# Patient Record
Sex: MALE | Race: WHITE | HISPANIC OR LATINO
[De-identification: names, ages, dates, MRNs, and addresses within clinical notes are randomized per-mention and may not be internally consistent; named-entity substitution may affect disease eponyms.]

---

## 2017-01-03 ENCOUNTER — APPOINTMENT (OUTPATIENT)
Dept: SURGICAL ONCOLOGY | Facility: CLINIC | Age: 82
End: 2017-01-03

## 2017-01-03 VITALS
RESPIRATION RATE: 13 BRPM | HEART RATE: 60 BPM | HEIGHT: 63 IN | DIASTOLIC BLOOD PRESSURE: 77 MMHG | BODY MASS INDEX: 28.88 KG/M2 | SYSTOLIC BLOOD PRESSURE: 133 MMHG | WEIGHT: 163 LBS

## 2017-01-03 DIAGNOSIS — N40.0 BENIGN PROSTATIC HYPERPLASIA WITHOUT LOWER URINARY TRACT SYMPMS: ICD-10-CM

## 2017-01-03 DIAGNOSIS — E03.9 HYPOTHYROIDISM, UNSPECIFIED: ICD-10-CM

## 2017-01-03 DIAGNOSIS — E78.5 HYPERLIPIDEMIA, UNSPECIFIED: ICD-10-CM

## 2017-01-03 DIAGNOSIS — I25.10 ATHEROSCLEROTIC HEART DISEASE OF NATIVE CORONARY ARTERY W/OUT ANGINA PECTORIS: ICD-10-CM

## 2017-01-03 DIAGNOSIS — K21.9 GASTRO-ESOPHAGEAL REFLUX DISEASE W/OUT ESOPHAGITIS: ICD-10-CM

## 2017-01-03 DIAGNOSIS — Z87.891 PERSONAL HISTORY OF NICOTINE DEPENDENCE: ICD-10-CM

## 2017-01-03 RX ORDER — LEVOTHYROXINE SODIUM 0.05 MG/1
50 TABLET ORAL
Refills: 0 | Status: ACTIVE | COMMUNITY

## 2017-01-03 RX ORDER — PANTOPRAZOLE SODIUM 40 MG/1
40 TABLET, DELAYED RELEASE ORAL
Refills: 0 | Status: ACTIVE | COMMUNITY

## 2017-01-03 RX ORDER — PRAVASTATIN SODIUM 80 MG/1
TABLET ORAL
Refills: 0 | Status: ACTIVE | COMMUNITY

## 2017-01-03 RX ORDER — FINASTERIDE 1 MG/1
TABLET ORAL
Refills: 0 | Status: ACTIVE | COMMUNITY

## 2017-01-06 ENCOUNTER — OTHER (OUTPATIENT)
Age: 82
End: 2017-01-06

## 2017-01-09 ENCOUNTER — RESULT REVIEW (OUTPATIENT)
Age: 82
End: 2017-01-09

## 2017-01-10 ENCOUNTER — OUTPATIENT (OUTPATIENT)
Dept: OUTPATIENT SERVICES | Facility: HOSPITAL | Age: 82
LOS: 1 days | End: 2017-01-10
Payer: MEDICARE

## 2017-01-10 DIAGNOSIS — C16.0 MALIGNANT NEOPLASM OF CARDIA: ICD-10-CM

## 2017-01-10 PROCEDURE — C1726: CPT

## 2017-01-10 PROCEDURE — 88313 SPECIAL STAINS GROUP 2: CPT | Mod: 26

## 2017-01-10 PROCEDURE — 88342 IMHCHEM/IMCYTCHM 1ST ANTB: CPT

## 2017-01-10 PROCEDURE — 88312 SPECIAL STAINS GROUP 1: CPT | Mod: 26

## 2017-01-10 PROCEDURE — 43238 EGD US FINE NEEDLE BX/ASPIR: CPT

## 2017-01-10 PROCEDURE — 88312 SPECIAL STAINS GROUP 1: CPT

## 2017-01-10 PROCEDURE — 88173 CYTOPATH EVAL FNA REPORT: CPT

## 2017-01-10 PROCEDURE — 88341 IMHCHEM/IMCYTCHM EA ADD ANTB: CPT

## 2017-01-10 PROCEDURE — 88360 TUMOR IMMUNOHISTOCHEM/MANUAL: CPT

## 2017-01-10 PROCEDURE — 88360 TUMOR IMMUNOHISTOCHEM/MANUAL: CPT | Mod: 26,59

## 2017-01-10 PROCEDURE — 88342 IMHCHEM/IMCYTCHM 1ST ANTB: CPT | Mod: 26,59

## 2017-01-10 PROCEDURE — 88341 IMHCHEM/IMCYTCHM EA ADD ANTB: CPT | Mod: 26,59

## 2017-01-10 PROCEDURE — 88365 INSITU HYBRIDIZATION (FISH): CPT

## 2017-01-10 PROCEDURE — 88305 TISSUE EXAM BY PATHOLOGIST: CPT | Mod: 26

## 2017-01-10 PROCEDURE — 88173 CYTOPATH EVAL FNA REPORT: CPT | Mod: 26

## 2017-01-10 PROCEDURE — 88305 TISSUE EXAM BY PATHOLOGIST: CPT

## 2017-01-10 PROCEDURE — 88172 CYTP DX EVAL FNA 1ST EA SITE: CPT

## 2017-01-10 PROCEDURE — 88365 INSITU HYBRIDIZATION (FISH): CPT | Mod: 26

## 2017-01-10 PROCEDURE — 88313 SPECIAL STAINS GROUP 2: CPT

## 2017-01-10 PROCEDURE — 88305 TISSUE EXAM BY PATHOLOGIST: CPT | Mod: 26,59

## 2017-01-18 LAB — NON-GYNECOLOGICAL CYTOLOGY STUDY: SIGNIFICANT CHANGE UP

## 2017-01-19 ENCOUNTER — APPOINTMENT (OUTPATIENT)
Dept: SURGICAL ONCOLOGY | Facility: CLINIC | Age: 82
End: 2017-01-19

## 2017-01-19 VITALS
SYSTOLIC BLOOD PRESSURE: 125 MMHG | HEART RATE: 77 BPM | HEIGHT: 63 IN | BODY MASS INDEX: 28.62 KG/M2 | WEIGHT: 161.5 LBS | DIASTOLIC BLOOD PRESSURE: 72 MMHG

## 2017-01-19 DIAGNOSIS — C16.9 MALIGNANT NEOPLASM OF STOMACH, UNSPECIFIED: ICD-10-CM

## 2017-01-25 ENCOUNTER — APPOINTMENT (OUTPATIENT)
Dept: GASTROENTEROLOGY | Facility: CLINIC | Age: 82
End: 2017-01-25

## 2017-01-25 LAB — SURGICAL PATHOLOGY STUDY: SIGNIFICANT CHANGE UP

## 2017-03-13 ENCOUNTER — APPOINTMENT (OUTPATIENT)
Dept: PULMONOLOGY | Facility: CLINIC | Age: 82
End: 2017-03-13

## 2018-12-19 ENCOUNTER — INPATIENT (INPATIENT)
Facility: HOSPITAL | Age: 83
LOS: 9 days | Discharge: HOME | DRG: 314 | End: 2018-12-29
Attending: FAMILY MEDICINE | Admitting: FAMILY MEDICINE
Payer: MEDICARE

## 2018-12-19 VITALS
HEART RATE: 63 BPM | SYSTOLIC BLOOD PRESSURE: 169 MMHG | TEMPERATURE: 97 F | OXYGEN SATURATION: 100 % | HEIGHT: 65 IN | WEIGHT: 145.06 LBS | DIASTOLIC BLOOD PRESSURE: 58 MMHG | RESPIRATION RATE: 18 BRPM

## 2018-12-19 DIAGNOSIS — C16.9 MALIGNANT NEOPLASM OF STOMACH, UNSPECIFIED: ICD-10-CM

## 2018-12-19 DIAGNOSIS — I31.3 PERICARDIAL EFFUSION (NONINFLAMMATORY): ICD-10-CM

## 2018-12-19 DIAGNOSIS — E03.9 HYPOTHYROIDISM, UNSPECIFIED: ICD-10-CM

## 2018-12-19 DIAGNOSIS — N18.5 CHRONIC KIDNEY DISEASE, STAGE 5: ICD-10-CM

## 2018-12-19 DIAGNOSIS — I10 ESSENTIAL (PRIMARY) HYPERTENSION: ICD-10-CM

## 2018-12-19 DIAGNOSIS — R33.9 RETENTION OF URINE, UNSPECIFIED: ICD-10-CM

## 2018-12-19 DIAGNOSIS — N32.89 OTHER SPECIFIED DISORDERS OF BLADDER: ICD-10-CM

## 2018-12-19 LAB
ALBUMIN SERPL ELPH-MCNC: 3.8 G/DL — SIGNIFICANT CHANGE UP (ref 3.3–5)
ALP SERPL-CCNC: 103 U/L — SIGNIFICANT CHANGE UP (ref 40–120)
ALT FLD-CCNC: 7 U/L — LOW (ref 10–45)
ANION GAP SERPL CALC-SCNC: 17 MMOL/L — SIGNIFICANT CHANGE UP (ref 5–17)
APPEARANCE UR: CLEAR — SIGNIFICANT CHANGE UP
APTT BLD: 30 SEC — SIGNIFICANT CHANGE UP (ref 27.5–36.3)
AST SERPL-CCNC: 10 U/L — SIGNIFICANT CHANGE UP (ref 10–40)
BACTERIA # UR AUTO: NEGATIVE — SIGNIFICANT CHANGE UP
BASOPHILS # BLD AUTO: 0 K/UL — SIGNIFICANT CHANGE UP (ref 0–0.2)
BASOPHILS NFR BLD AUTO: 0.5 % — SIGNIFICANT CHANGE UP (ref 0–2)
BILIRUB SERPL-MCNC: 0.2 MG/DL — SIGNIFICANT CHANGE UP (ref 0.2–1.2)
BILIRUB UR-MCNC: NEGATIVE — SIGNIFICANT CHANGE UP
BUN SERPL-MCNC: 96 MG/DL — HIGH (ref 7–23)
CALCIUM SERPL-MCNC: 9.6 MG/DL — SIGNIFICANT CHANGE UP (ref 8.4–10.5)
CHLORIDE SERPL-SCNC: 107 MMOL/L — SIGNIFICANT CHANGE UP (ref 96–108)
CO2 SERPL-SCNC: 17 MMOL/L — LOW (ref 22–31)
COLOR SPEC: COLORLESS — SIGNIFICANT CHANGE UP
CREAT SERPL-MCNC: 7.62 MG/DL — HIGH (ref 0.5–1.3)
DIFF PNL FLD: NEGATIVE — SIGNIFICANT CHANGE UP
EOSINOPHIL # BLD AUTO: 0.4 K/UL — SIGNIFICANT CHANGE UP (ref 0–0.5)
EOSINOPHIL NFR BLD AUTO: 4.4 % — SIGNIFICANT CHANGE UP (ref 0–6)
EPI CELLS # UR: 0 /HPF — SIGNIFICANT CHANGE UP
GAS PNL BLDV: SIGNIFICANT CHANGE UP
GLUCOSE SERPL-MCNC: 100 MG/DL — HIGH (ref 70–99)
GLUCOSE UR QL: NEGATIVE — SIGNIFICANT CHANGE UP
HCT VFR BLD CALC: 24 % — LOW (ref 39–50)
HGB BLD-MCNC: 8.5 G/DL — LOW (ref 13–17)
HYALINE CASTS # UR AUTO: 1 /LPF — SIGNIFICANT CHANGE UP (ref 0–2)
INR BLD: 1.03 RATIO — SIGNIFICANT CHANGE UP (ref 0.88–1.16)
KETONES UR-MCNC: NEGATIVE — SIGNIFICANT CHANGE UP
LEUKOCYTE ESTERASE UR-ACNC: NEGATIVE — SIGNIFICANT CHANGE UP
LYMPHOCYTES # BLD AUTO: 1.4 K/UL — SIGNIFICANT CHANGE UP (ref 1–3.3)
LYMPHOCYTES # BLD AUTO: 17.7 % — SIGNIFICANT CHANGE UP (ref 13–44)
MCHC RBC-ENTMCNC: 32.2 PG — SIGNIFICANT CHANGE UP (ref 27–34)
MCHC RBC-ENTMCNC: 35.3 GM/DL — SIGNIFICANT CHANGE UP (ref 32–36)
MCV RBC AUTO: 91.2 FL — SIGNIFICANT CHANGE UP (ref 80–100)
MONOCYTES # BLD AUTO: 0.7 K/UL — SIGNIFICANT CHANGE UP (ref 0–0.9)
MONOCYTES NFR BLD AUTO: 8.7 % — SIGNIFICANT CHANGE UP (ref 2–14)
NEUTROPHILS # BLD AUTO: 5.6 K/UL — SIGNIFICANT CHANGE UP (ref 1.8–7.4)
NEUTROPHILS NFR BLD AUTO: 68.8 % — SIGNIFICANT CHANGE UP (ref 43–77)
NITRITE UR-MCNC: NEGATIVE — SIGNIFICANT CHANGE UP
PH UR: 6.5 — SIGNIFICANT CHANGE UP (ref 5–8)
PLATELET # BLD AUTO: 224 K/UL — SIGNIFICANT CHANGE UP (ref 150–400)
POTASSIUM SERPL-MCNC: 4.9 MMOL/L — SIGNIFICANT CHANGE UP (ref 3.5–5.3)
POTASSIUM SERPL-SCNC: 4.9 MMOL/L — SIGNIFICANT CHANGE UP (ref 3.5–5.3)
PROT SERPL-MCNC: 6.6 G/DL — SIGNIFICANT CHANGE UP (ref 6–8.3)
PROT UR-MCNC: ABNORMAL
PROTHROM AB SERPL-ACNC: 11.7 SEC — SIGNIFICANT CHANGE UP (ref 10–12.9)
RBC # BLD: 2.63 M/UL — LOW (ref 4.2–5.8)
RBC # FLD: 12.6 % — SIGNIFICANT CHANGE UP (ref 10.3–14.5)
RBC CASTS # UR COMP ASSIST: 5 /HPF — HIGH (ref 0–4)
SODIUM SERPL-SCNC: 141 MMOL/L — SIGNIFICANT CHANGE UP (ref 135–145)
SP GR SPEC: 1.01 — SIGNIFICANT CHANGE UP (ref 1.01–1.02)
UROBILINOGEN FLD QL: NEGATIVE — SIGNIFICANT CHANGE UP
WBC # BLD: 8.1 K/UL — SIGNIFICANT CHANGE UP (ref 3.8–10.5)
WBC # FLD AUTO: 8.1 K/UL — SIGNIFICANT CHANGE UP (ref 3.8–10.5)
WBC UR QL: 1 /HPF — SIGNIFICANT CHANGE UP (ref 0–5)

## 2018-12-19 PROCEDURE — 71250 CT THORAX DX C-: CPT | Mod: 26

## 2018-12-19 PROCEDURE — 99223 1ST HOSP IP/OBS HIGH 75: CPT

## 2018-12-19 PROCEDURE — 93306 TTE W/DOPPLER COMPLETE: CPT | Mod: 26

## 2018-12-19 PROCEDURE — 93010 ELECTROCARDIOGRAM REPORT: CPT

## 2018-12-19 PROCEDURE — 99285 EMERGENCY DEPT VISIT HI MDM: CPT | Mod: 25

## 2018-12-19 PROCEDURE — 74176 CT ABD & PELVIS W/O CONTRAST: CPT | Mod: 26

## 2018-12-19 RX ORDER — SODIUM BICARBONATE 1 MEQ/ML
650 SYRINGE (ML) INTRAVENOUS
Qty: 0 | Refills: 0 | Status: DISCONTINUED | OUTPATIENT
Start: 2018-12-19 | End: 2018-12-29

## 2018-12-19 RX ORDER — INFLUENZA VIRUS VACCINE 15; 15; 15; 15 UG/.5ML; UG/.5ML; UG/.5ML; UG/.5ML
0.5 SUSPENSION INTRAMUSCULAR ONCE
Qty: 0 | Refills: 0 | Status: COMPLETED | OUTPATIENT
Start: 2018-12-19 | End: 2018-12-19

## 2018-12-19 RX ORDER — HEPARIN SODIUM 5000 [USP'U]/ML
5000 INJECTION INTRAVENOUS; SUBCUTANEOUS EVERY 8 HOURS
Qty: 0 | Refills: 0 | Status: DISCONTINUED | OUTPATIENT
Start: 2018-12-19 | End: 2018-12-19

## 2018-12-19 RX ORDER — LEVOTHYROXINE SODIUM 125 MCG
75 TABLET ORAL DAILY
Qty: 0 | Refills: 0 | Status: DISCONTINUED | OUTPATIENT
Start: 2018-12-19 | End: 2018-12-29

## 2018-12-19 NOTE — H&P ADULT - PROBLEM SELECTOR PLAN 2
See above.  Would clarify with Dr. Umaña further considerations in this regard.  Will temporarily HOLD Proscar with Soniya.

## 2018-12-19 NOTE — H&P ADULT - ATTENDING COMMENTS
NIGHT HOSPITALIST:   Patient/ adult son in attendance aware of course and agree with plan/care as above.  Reviewed above in patient's native [place].   Patient's prognosis is poor.   Emotional support provided to patient/ son.  Patient/ son are not yet ready to address advance directives.  Care reviewed with covering NP/PA.   Care assumed by Dr. Pulliam.    Dick López MD  [phone]

## 2018-12-19 NOTE — H&P ADULT - PMH
Benign prostatic hyperplasia, unspecified whether lower urinary tract symptoms present    Hypothyroidism, unspecified type    Malignant neoplasm of stomach, unspecified location

## 2018-12-19 NOTE — H&P ADULT - NSHPSOURCEINFOTX_GEN_ALL_CORE
Adult son in attendance--reviewed medication bottles with son.   Patient's primary language is Czech but declined  phone--patient interviewed by examiner in patient's native Czech and son is bilingual.

## 2018-12-19 NOTE — H&P ADULT - NSHPSOCIALHISTORY_GEN_ALL_CORE
No ethanol history.  Quit tobacco in 2017.  Supportive adult sons, with one son currently undergoing treatment for Burkitt's lymphoma. No ethanol history.  No reported tobacco.    Supportive adult sons, with one son currently undergoing treatment for Burkitt's lymphoma.

## 2018-12-19 NOTE — H&P ADULT - NSHPPHYSICALEXAM_GEN_ALL_CORE
Physical exam with an elderly, cachectic, chronically ill appearing M.  Afebrile.  HR  60  RR 14.  BP  180/66  100% on RA.  HEENT< PERRL< EOMI< bitemporal wasting, neck supple, chest clear, bony rib cage, cor s1 s2, abdomen scaphoid, soft nontender, no rebound, ext with diffuse sarcopenia and interossei muscle wasting, 2++ B/L LE oedema.  Skin dry.   Poor nail hygiene.   No ulcers.   Neurologic exam hard of hearing, AxOx3.  Speech fluent.   Cognition grossly intact albeit somewhat dependent upon son in attendance during interview.  UE/Le 5/5.

## 2018-12-19 NOTE — H&P ADULT - NSHPOUTPATIENTPROVIDERS_GEN_ALL_CORE
Emmanuel Pulliam  [phone]  Neelam Pacheco MD [phone]  Edouard Holman DO  [phone]  Herber Umaña  [phone]

## 2018-12-19 NOTE — H&P ADULT - HISTORY OF PRESENT ILLNESS
NIGHT HOSPITALIST:   Patient UNKNOWN to me previously, assigned to me at this point via the ER and by Dr. Pulliam to admit this 86 y/o M--patient seen with patient's adult son in attendance--patient interviewed by examiner in patient's native Bulgarian--son is bilingual and patient/son declined telephone --patient apparently with a history of gastric CA with patient consistently declining operative and standard chemotherapy options, with patient apparently on a mistletoe treatment (?) as a nonstandard approach with a group in [place] (?) following an evaluation in [place]--patient with progressive B/L LE oedema with progressive azotemia and with suprapubic fullness.   Patient with reported history of unspecified prostate disorder and follows Dr. Umaña above.  Apparently patient is now reconsidering treatment by Dr. Pacheco above but referred to the ER due to the progression of Cr.  Patient denies abdominal pain, no red blood per rectum or melena.  No chest pain/pressure.  No dyspnoea.  No back pain, no tearing back pain.   No fever, no chills, no rigors.   No HA, no focal weakness.   Patient with anorexia and unspecified involuntary weight loss.   No rash, no joint pain.   No dysuria, no haematuria.   Remaining review of systems not contributory.

## 2018-12-19 NOTE — H&P ADULT - PROBLEM SELECTOR PLAN 1
See above.   Referred by renal.  Renal US>  Byrnes placed for now.  Sodium bicarbonate 650 mg 4XD.  Daily weights.

## 2018-12-19 NOTE — H&P ADULT - PROBLEM SELECTOR PLAN 6
See above.   Would clarify further considerations for management with Dr. Pacheco above.  Aspiration precautions.

## 2018-12-19 NOTE — ED PROVIDER NOTE - OBJECTIVE STATEMENT
86 yo M h/o gastric CA on natural treatment no chemo, HTN, HLD presents with 1 week worsening abdominal pain and distention, elevated creatinine in Dr. Edouard Lynn's office 7.6, and urinary retention. Pt denies fever, chills, N/V/D, blood in stool or urine, CP, SOB, fall/trauma, AMS.

## 2018-12-19 NOTE — H&P ADULT - PROBLEM SELECTOR PLAN 5
Would follow and review with renal if consideration with low dose Hydralazine PO on an interval basis.

## 2018-12-19 NOTE — ED ADULT NURSE NOTE - OBJECTIVE STATEMENT
84 yo male A&ox3 presents to the ED with the c/o being sent in by his doctor for elevated BUN and creatinine. Pt states that he has also been having back pain s/p fall 3 weeks ago. Pt denies getting any x-rays or ct scans for back pain. Hx of gastric cancer, not on chemo or radiation. As per son pt has getting injections for gastric cancer and " IV  treatments" in [place]. Pt denies any cp, no sob an no cough, pt denies dizziness. + abd pain, no tenderness and no n/v.  MAEX4

## 2018-12-19 NOTE — H&P ADULT - ASSESSMENT
NIGHT HOSPITALIST:  Presentation of patient after referral for progressive azotemia at least initially from obstructive uropathy with history of reported gastric CA with patient refusing all prior standard treatment (family has deferred to the patient's decisions), with bladder thickening unclear if this represents pathology from chronic obstruction versus a primary bladder neoplasm.   Unclear to the large pericardial effusion>>proper echo ordered by the ER.  Patient with no clinical evidence of tamponade>>would monitor patient's BP for now but with further elevation would review with renal consideration of low dose oral hydralazine if no contraindications.  Unclear if the pericardial process is from patient's gastric CA involvement.      Will temporarily HOLD Proscar for now, given Byrnes placement>>may inform Dr. Umaña of patient's admission.

## 2018-12-19 NOTE — ED PROVIDER NOTE - ATTENDING CONTRIBUTION TO CARE
86 y/o male with the above documented history and HPI who on exam appears comfortable. VSs noted, sclerae anicteric, MM's moist, neck supple, breathing c/ ease, abdomen soft c/ fullness and discomfort in suprapubic region, extremities s/ asymmetry, skin s/ rash or jaundice and neurologically intact. Screening studies ordered as requested. Will place ayala for apparent retention. To be admitted.

## 2018-12-19 NOTE — H&P ADULT - NSHPLABSRESULTS_GEN_ALL_CORE
WBC 8.1.  Hgb 8.5.  Platelets of 224K.  K+ 4.9.  Random glucose of 100.  CR 7.6.  HCO3 17.  Abl 3.8.  U/A with 1 WBC and 30 protein.  EKG tracing reviewed with no hyperacute T waves, NSR at 58.  CTT trunk no contrast with large pericardial effusion and small LEFT pleural effusion, B/L hydro with urinary bladder thickening, thickening of the gastric wall with gastrohepatic ligament adenopathy.

## 2018-12-19 NOTE — ED PROVIDER NOTE - CHIEF COMPLAINT
The patient is a 85y Male complaining of The patient is a 85y Male complaining of abdominal pain/distention

## 2018-12-20 ENCOUNTER — RESULT REVIEW (OUTPATIENT)
Age: 83
End: 2018-12-20

## 2018-12-20 LAB
AMYLASE FLD-CCNC: 20 U/L — SIGNIFICANT CHANGE UP
ANION GAP SERPL CALC-SCNC: 15 MMOL/L — SIGNIFICANT CHANGE UP (ref 5–17)
B PERT IGG+IGM PNL SER: ABNORMAL
BASOPHILS # BLD AUTO: 0.02 K/UL — SIGNIFICANT CHANGE UP (ref 0–0.2)
BASOPHILS NFR BLD AUTO: 0.2 % — SIGNIFICANT CHANGE UP (ref 0–2)
BLD GP AB SCN SERPL QL: NEGATIVE — SIGNIFICANT CHANGE UP
BUN SERPL-MCNC: 103 MG/DL — HIGH (ref 7–23)
C3 SERPL-MCNC: 99 MG/DL — SIGNIFICANT CHANGE UP (ref 81–157)
C4 SERPL-MCNC: 32 MG/DL — SIGNIFICANT CHANGE UP (ref 13–39)
CALCIUM SERPL-MCNC: 9.6 MG/DL — SIGNIFICANT CHANGE UP (ref 8.4–10.5)
CHLORIDE SERPL-SCNC: 111 MMOL/L — HIGH (ref 96–108)
CO2 SERPL-SCNC: 19 MMOL/L — LOW (ref 22–31)
COLOR FLD: YELLOW — SIGNIFICANT CHANGE UP
CREAT ?TM UR-MCNC: 24 MG/DL — SIGNIFICANT CHANGE UP
CREAT SERPL-MCNC: 7.92 MG/DL — HIGH (ref 0.5–1.3)
CULTURE RESULTS: NO GROWTH — SIGNIFICANT CHANGE UP
EOSINOPHIL # BLD AUTO: 0.1 K/UL — SIGNIFICANT CHANGE UP (ref 0–0.5)
EOSINOPHIL NFR BLD AUTO: 1.2 % — SIGNIFICANT CHANGE UP (ref 0–6)
FLUID INTAKE SUBSTANCE CLASS: SIGNIFICANT CHANGE UP
FLUID SEGMENTED GRANULOCYTES: 5 % — SIGNIFICANT CHANGE UP
GLUCOSE FLD-MCNC: 180 MG/DL — SIGNIFICANT CHANGE UP
GLUCOSE SERPL-MCNC: 98 MG/DL — SIGNIFICANT CHANGE UP (ref 70–99)
HCT VFR BLD CALC: 25.9 % — LOW (ref 39–50)
HGB BLD-MCNC: 8.3 G/DL — LOW (ref 13–17)
IMM GRANULOCYTES NFR BLD AUTO: 0.2 % — SIGNIFICANT CHANGE UP (ref 0–1.5)
LDH SERPL L TO P-CCNC: 133 U/L — SIGNIFICANT CHANGE UP
LYMPHOCYTES # BLD AUTO: 0.93 K/UL — LOW (ref 1–3.3)
LYMPHOCYTES # BLD AUTO: 11.4 % — LOW (ref 13–44)
LYMPHOCYTES # FLD: 12 % — SIGNIFICANT CHANGE UP
MCHC RBC-ENTMCNC: 29.5 PG — SIGNIFICANT CHANGE UP (ref 27–34)
MCHC RBC-ENTMCNC: 32 GM/DL — SIGNIFICANT CHANGE UP (ref 32–36)
MCV RBC AUTO: 92.2 FL — SIGNIFICANT CHANGE UP (ref 80–100)
MESOTHL CELL # FLD: 7 % — SIGNIFICANT CHANGE UP
MONOCYTES # BLD AUTO: 0.63 K/UL — SIGNIFICANT CHANGE UP (ref 0–0.9)
MONOCYTES NFR BLD AUTO: 7.7 % — SIGNIFICANT CHANGE UP (ref 2–14)
MONOS+MACROS # FLD: 69 % — SIGNIFICANT CHANGE UP
NEUTROPHILS # BLD AUTO: 6.49 K/UL — SIGNIFICANT CHANGE UP (ref 1.8–7.4)
NEUTROPHILS NFR BLD AUTO: 79.3 % — HIGH (ref 43–77)
OTHER CELLS FLD MANUAL: 7 % — SIGNIFICANT CHANGE UP
PLATELET # BLD AUTO: 198 K/UL — SIGNIFICANT CHANGE UP (ref 150–400)
POTASSIUM SERPL-MCNC: 5.2 MMOL/L — SIGNIFICANT CHANGE UP (ref 3.5–5.3)
POTASSIUM SERPL-SCNC: 5.2 MMOL/L — SIGNIFICANT CHANGE UP (ref 3.5–5.3)
PROT ?TM UR-MCNC: 630 MG/DL — HIGH (ref 0–12)
PROT FLD-MCNC: 3.9 G/DL — SIGNIFICANT CHANGE UP
PROT/CREAT UR-RTO: 26.3 RATIO — HIGH (ref 0–0.2)
RBC # BLD: 2.81 M/UL — LOW (ref 4.2–5.8)
RBC # FLD: 14.3 % — SIGNIFICANT CHANGE UP (ref 10.3–14.5)
RCV VOL RI: 2900 /UL — HIGH (ref 0–5)
RH IG SCN BLD-IMP: POSITIVE — SIGNIFICANT CHANGE UP
SODIUM SERPL-SCNC: 145 MMOL/L — SIGNIFICANT CHANGE UP (ref 135–145)
SPECIMEN SOURCE: SIGNIFICANT CHANGE UP
TOTAL NUCLEATED CELL COUNT, BODY FLUID: 280 /UL — HIGH (ref 0–5)
TSH SERPL-MCNC: 8.92 UIU/ML — HIGH (ref 0.27–4.2)
TUBE TYPE: SIGNIFICANT CHANGE UP
WBC # BLD: 8.19 K/UL — SIGNIFICANT CHANGE UP (ref 3.8–10.5)
WBC # FLD AUTO: 8.19 K/UL — SIGNIFICANT CHANGE UP (ref 3.8–10.5)

## 2018-12-20 PROCEDURE — 93010 ELECTROCARDIOGRAM REPORT: CPT

## 2018-12-20 PROCEDURE — 33010: CPT

## 2018-12-20 PROCEDURE — 33999 UNLISTED PX CARDIAC SURGERY: CPT

## 2018-12-20 PROCEDURE — 88112 CYTOPATH CELL ENHANCE TECH: CPT | Mod: 26

## 2018-12-20 PROCEDURE — 88305 TISSUE EXAM BY PATHOLOGIST: CPT | Mod: 26

## 2018-12-20 PROCEDURE — 99222 1ST HOSP IP/OBS MODERATE 55: CPT

## 2018-12-20 PROCEDURE — 76770 US EXAM ABDO BACK WALL COMP: CPT | Mod: 26

## 2018-12-20 PROCEDURE — 88108 CYTOPATH CONCENTRATE TECH: CPT | Mod: 26,59

## 2018-12-20 PROCEDURE — 88341 IMHCHEM/IMCYTCHM EA ADD ANTB: CPT | Mod: 26

## 2018-12-20 PROCEDURE — 76930: CPT

## 2018-12-20 PROCEDURE — 75989 ABSCESS DRAINAGE UNDER X-RAY: CPT | Mod: 26

## 2018-12-20 PROCEDURE — 88342 IMHCHEM/IMCYTCHM 1ST ANTB: CPT | Mod: 26

## 2018-12-20 RX ORDER — ACETAMINOPHEN 500 MG
650 TABLET ORAL ONCE
Qty: 0 | Refills: 0 | Status: COMPLETED | OUTPATIENT
Start: 2018-12-20 | End: 2018-12-20

## 2018-12-20 RX ORDER — DESMOPRESSIN ACETATE 0.1 MG/1
20 TABLET ORAL ONCE
Qty: 0 | Refills: 0 | Status: DISCONTINUED | OUTPATIENT
Start: 2018-12-20 | End: 2018-12-20

## 2018-12-20 RX ORDER — ACETAMINOPHEN 500 MG
1000 TABLET ORAL ONCE
Qty: 0 | Refills: 0 | Status: COMPLETED | OUTPATIENT
Start: 2018-12-20 | End: 2018-12-20

## 2018-12-20 RX ADMIN — Medication 1000 MILLIGRAM(S): at 21:27

## 2018-12-20 RX ADMIN — Medication 650 MILLIGRAM(S): at 05:12

## 2018-12-20 RX ADMIN — Medication 650 MILLIGRAM(S): at 23:07

## 2018-12-20 RX ADMIN — Medication 75 MICROGRAM(S): at 05:12

## 2018-12-20 RX ADMIN — Medication 400 MILLIGRAM(S): at 20:35

## 2018-12-20 RX ADMIN — Medication 650 MILLIGRAM(S): at 11:44

## 2018-12-20 NOTE — PROGRESS NOTE ADULT - SUBJECTIVE AND OBJECTIVE BOX
Patient is a 85y old  Male who presents with a chief complaint of Worsening creatinine with suprapubic fullness (20 Dec 2018 02:25)      INTERVAL HPI/OVERNIGHT EVENTS:    MEDICATIONS  (STANDING):  influenza   Vaccine 0.5 milliLiter(s) IntraMuscular once  levothyroxine 75 MICROGram(s) Oral daily  sodium bicarbonate 650 milliGRAM(s) Oral four times a day    MEDICATIONS  (PRN):      Allergies    No Known Allergies    Intolerances        Vital Signs Last 24 Hrs  T(C): 37.1 (20 Dec 2018 03:54), Max: 37.3 (20 Dec 2018 00:55)  T(F): 98.8 (20 Dec 2018 03:54), Max: 99.1 (20 Dec 2018 00:55)  HR: 63 (20 Dec 2018 03:54) (60 - 68)  BP: 169/66 (20 Dec 2018 03:54) (169/58 - 184/65)  BP(mean): --  RR: 18 (20 Dec 2018 03:54) (16 - 18)  SpO2: 96% (20 Dec 2018 03:54) (94% - 100%)    LABS:                        8.3    8.19  )-----------( 198      ( 20 Dec 2018 08:14 )             25.9     12-20    145  |  111<H>  |  103<H>  ----------------------------<  98  5.2   |  19<L>  |  7.92<H>    Ca    9.6      20 Dec 2018 06:37    TPro  6.6  /  Alb  3.8  /  TBili  0.2  /  DBili  x   /  AST  10  /  ALT  7<L>  /  AlkPhos  103  12-19    PT/INR - ( 19 Dec 2018 20:36 )   PT: 11.7 sec;   INR: 1.03 ratio         PTT - ( 19 Dec 2018 20:36 )  PTT:30.0 sec  Urinalysis Basic - ( 19 Dec 2018 15:45 )    Color: Colorless / Appearance: Clear / S.012 / pH: x  Gluc: x / Ketone: Negative  / Bili: Negative / Urobili: Negative   Blood: x / Protein: 30 mg/dL / Nitrite: Negative   Leuk Esterase: Negative / RBC: 5 /hpf / WBC 1 /hpf   Sq Epi: x / Non Sq Epi: 0 /hpf / Bacteria: Negative        RADIOLOGY & ADDITIONAL TESTS:        Dr Lewis [phone] Patient is a 85y old  Male who presents with a chief complaint of Worsening creatinine with suprapubic fullness (20 Dec 2018 02:25)    pt on tele -ectopy noted  INTERVAL HPI/OVERNIGHT EVENTS:    MEDICATIONS  (STANDING):  influenza   Vaccine 0.5 milliLiter(s) IntraMuscular once  levothyroxine 75 MICROGram(s) Oral daily  sodium bicarbonate 650 milliGRAM(s) Oral four times a day    MEDICATIONS  (PRN):      Allergies    No Known Allergies    Intolerances        Vital Signs Last 24 Hrs  T(C): 37.1 (20 Dec 2018 03:54), Max: 37.3 (20 Dec 2018 00:55)  T(F): 98.8 (20 Dec 2018 03:54), Max: 99.1 (20 Dec 2018 00:55)  HR: 63 (20 Dec 2018 03:54) (60 - 68)  BP: 169/66 (20 Dec 2018 03:54) (169/58 - 184/65)  BP(mean): --  RR: 18 (20 Dec 2018 03:54) (16 - 18)  SpO2: 96% (20 Dec 2018 03:54) (94% - 100%)    LABS:                        8.3    8.19  )-----------( 198      ( 20 Dec 2018 08:14 )             25.9     12-20    145  |  111<H>  |  103<H>  ----------------------------<  98  5.2   |  19<L>  |  7.92<H>    Ca    9.6      20 Dec 2018 06:37    TPro  6.6  /  Alb  3.8  /  TBili  0.2  /  DBili  x   /  AST  10  /  ALT  7<L>  /  AlkPhos  103  12-19    PT/INR - ( 19 Dec 2018 20:36 )   PT: 11.7 sec;   INR: 1.03 ratio         PTT - ( 19 Dec 2018 20:36 )  PTT:30.0 sec  Urinalysis Basic - ( 19 Dec 2018 15:45 )    Color: Colorless / Appearance: Clear / S.012 / pH: x  Gluc: x / Ketone: Negative  / Bili: Negative / Urobili: Negative   Blood: x / Protein: 30 mg/dL / Nitrite: Negative   Leuk Esterase: Negative / RBC: 5 /hpf / WBC 1 /hpf   Sq Epi: x / Non Sq Epi: 0 /hpf / Bacteria: Negative        RADIOLOGY & ADDITIONAL TESTS:        Dr Lewis [phone]

## 2018-12-20 NOTE — CHART NOTE - NSCHARTNOTEFT_GEN_A_CORE
CCU Accept Note    Transfer from: CATH Lab    HPI: 86 y/o M-patient apparently with a history of gastric CA with patient consistently declining operative and standard chemotherapy options, with patient apparently on a mistletoe treatment (?) as a nonstandard approach with a group in [place] (?) following an evaluation in [place]--patient with progressive B/L LE oedema with progressive azotemia and with suprapubic fullness.   Patient with reported history of unspecified prostate disorder and follows Dr. Umaña above.  Apparently patient is now reconsidering treatment by Dr. Pacheco was noticed with worsening renal function. seen in office yesterday for abn creatinine. was hyperkalemic and cr 7 sent to er for staging and r/o obstruction. also with large pericardial effusion. CT abd- Large pericardial effusion and small left pleural effusion. Bilateral hydroureteronephrosis, severe on the left and moderate on the right, to the level of the urinary bladder. Thickening of the gastric wall in the region of the fundus likely corresponds with patient's known gastric cancer.      Vital Signs Last 24 Hrs  T(C): 36.8 (20 Dec 2018 16:30), Max: 37.3 (20 Dec 2018 00:55)  T(F): 98.2 (20 Dec 2018 16:30), Max: 99.1 (20 Dec 2018 00:55)  HR: 58 (20 Dec 2018 18:00) (58 - 71)  BP: 143/59 (20 Dec 2018 18:00) (137/57 - 184/65)  BP(mean): 84 (20 Dec 2018 18:00) (79 - 88)  RR: 16 (20 Dec 2018 18:00) (15 - 18)  SpO2: 99% (20 Dec 2018 18:00) (94% - 100%)  I&O's Summary    19 Dec 2018 07:01  -  20 Dec 2018 07:00  --------------------------------------------------------  IN: 0 mL / OUT: 3250 mL / NET: -3250 mL    20 Dec 2018 07:01  -  20 Dec 2018 18:53  --------------------------------------------------------  IN: 900 mL / OUT: 1200 mL / NET: -300 mL    MEDICATIONS  (STANDING):  acetaminophen   Tablet .. 650 milliGRAM(s) Oral once  influenza   Vaccine 0.5 milliLiter(s) IntraMuscular once  levothyroxine 75 MICROGram(s) Oral daily  sodium bicarbonate 650 milliGRAM(s) Oral four times a day    MEDICATIONS  (PRN):  desmopressin IVPB 20 MICROGram(s) IV Intermittent once PRN ON CALL for pericardiocentesis      LABS                                            8.3                   Neurophils% (auto):   79.3   (12-20 @ 08:14):    8.19 )-----------(198          Lymphocytes% (auto):  11.4                                          25.9                   Eosinphils% (auto):   1.2      Manual%: Neutrophils x    ; Lymphocytes x    ; Eosinophils x    ; Bands%: x    ; Blasts x                                    145    |  111    |  103                 Calcium: 9.6   / iCa: x      (12-20 @ 06:37)    ----------------------------<  98        Magnesium: x                                5.2     |  19     |  7.92             Phosphorous: x          ( 12-19 @ 20:36 )   PT: 11.7 sec;   INR: 1.03 ratio  aPTT: 30.0 sec        ASSESSMENT & PLAN:  progressive B/L LE oedema with progressive azotemia and with suprapubic fullness. CT abd- Large pericardial effusion and small left pleural effusion.  Bilateral hydroureteronephrosis, severe on the left and moderate on the right, to the level of the urinary bladder.  Thickening of the gastric wall in the region of the fundus likely   corresponds with patient's known gastric cancer.      Krish Mendez, -ACNP  [id number] CCU Accept Note    Transfer from: CATH Lab    HPI: 86 y/o M-patient apparently with a history of gastric CA with patient consistently declining operative and standard chemotherapy options, with patient apparently on a mistletoe treatment (?) as a nonstandard approach with a group in [place] (?) following an evaluation in [place]--patient with progressive B/L LE oedema with progressive azotemia and with suprapubic fullness.   Patient with reported history of unspecified prostate disorder and follows Dr. Umaña above.  Apparently patient is now reconsidering treatment by Dr. Pacheco was noticed with worsening renal function. seen in office yesterday for abn creatinine. was hyperkalemic and cr 7 sent to er for staging and r/o obstruction. also with large pericardial effusion. CT abd- Large pericardial effusion and small left pleural effusion. Bilateral hydroureteronephrosis, severe on the left and moderate on the right, to the level of the urinary bladder. Thickening of the gastric wall in the region of the fundus likely corresponds with patient's known gastric cancer.      Vital Signs Last 24 Hrs  T(C): 36.8 (20 Dec 2018 16:30), Max: 37.3 (20 Dec 2018 00:55)  T(F): 98.2 (20 Dec 2018 16:30), Max: 99.1 (20 Dec 2018 00:55)  HR: 58 (20 Dec 2018 18:00) (58 - 71)  BP: 143/59 (20 Dec 2018 18:00) (137/57 - 184/65)  BP(mean): 84 (20 Dec 2018 18:00) (79 - 88)  RR: 16 (20 Dec 2018 18:00) (15 - 18)  SpO2: 99% (20 Dec 2018 18:00) (94% - 100%)  I&O's Summary    19 Dec 2018 07:01  -  20 Dec 2018 07:00  --------------------------------------------------------  IN: 0 mL / OUT: 3250 mL / NET: -3250 mL    20 Dec 2018 07:01  -  20 Dec 2018 18:53  --------------------------------------------------------  IN: 900 mL / OUT: 1200 mL / NET: -300 mL    MEDICATIONS  (STANDING):  acetaminophen   Tablet .. 650 milliGRAM(s) Oral once  influenza   Vaccine 0.5 milliLiter(s) IntraMuscular once  levothyroxine 75 MICROGram(s) Oral daily  sodium bicarbonate 650 milliGRAM(s) Oral four times a day    MEDICATIONS  (PRN):  desmopressin IVPB 20 MICROGram(s) IV Intermittent once PRN ON CALL for pericardiocentesis      LABS                                            8.3                   Neurophils% (auto):   79.3   (12-20 @ 08:14):    8.19 )-----------(198          Lymphocytes% (auto):  11.4                                          25.9                   Eosinphils% (auto):   1.2      Manual%: Neutrophils x    ; Lymphocytes x    ; Eosinophils x    ; Bands%: x    ; Blasts x                                    145    |  111    |  103                 Calcium: 9.6   / iCa: x      (12-20 @ 06:37)    ----------------------------<  98        Magnesium: x                                5.2     |  19     |  7.92             Phosphorous: x          ( 12-19 @ 20:36 )   PT: 11.7 sec;   INR: 1.03 ratio  aPTT: 30.0 sec        ASSESSMENT & PLAN:  progressive B/L LE oedema with progressive azotemia and with suprapubic fullness. CT abd- Large pericardial effusion and small left pleural effusion.  Bilateral hydroureteronephrosis, severe on the left and moderate on the right, to the level of the urinary bladder. Thickening of the gastric wall in the region of the fundus likely   corresponds with patient's known gastric cancer.      Krish Mendez, -ACNP  [id number] CCU Accept Note    Transfer from: CATH Lab    HPI: 86 y/o M-patient apparently with a history of gastric CA with patient consistently declining operative and standard chemotherapy options, with patient apparently on a mistletoe treatment (?) as a nonstandard approach with a group in [place] (?) following an evaluation in [place]--patient with progressive B/L LE oedema with progressive azotemia and with suprapubic fullness.   Patient with reported history of unspecified prostate disorder and follows Dr. Umaña above.  Apparently patient is now reconsidering treatment by Dr. Pacheco was noticed with worsening renal function. seen in office yesterday for abn creatinine. was hyperkalemic and cr 7 sent to er for staging and r/o obstruction. also with large pericardial effusion. CT abd- Large pericardial effusion and small left pleural effusion. Bilateral hydroureteronephrosis, severe on the left and moderate on the right, to the level of the urinary bladder. Thickening of the gastric wall in the region of the fundus likely corresponds with patient's known gastric cancer.      Vital Signs Last 24 Hrs  T(C): 36.8 (20 Dec 2018 16:30), Max: 37.3 (20 Dec 2018 00:55)  T(F): 98.2 (20 Dec 2018 16:30), Max: 99.1 (20 Dec 2018 00:55)  HR: 58 (20 Dec 2018 18:00) (58 - 71)  BP: 143/59 (20 Dec 2018 18:00) (137/57 - 184/65)  BP(mean): 84 (20 Dec 2018 18:00) (79 - 88)  RR: 16 (20 Dec 2018 18:00) (15 - 18)  SpO2: 99% (20 Dec 2018 18:00) (94% - 100%)  I&O's Summary    19 Dec 2018 07:01  -  20 Dec 2018 07:00  --------------------------------------------------------  IN: 0 mL / OUT: 3250 mL / NET: -3250 mL    20 Dec 2018 07:01  -  20 Dec 2018 18:53  --------------------------------------------------------  IN: 900 mL / OUT: 1200 mL / NET: -300 mL    MEDICATIONS  (STANDING):  acetaminophen   Tablet .. 650 milliGRAM(s) Oral once  influenza   Vaccine 0.5 milliLiter(s) IntraMuscular once  levothyroxine 75 MICROGram(s) Oral daily  sodium bicarbonate 650 milliGRAM(s) Oral four times a day    MEDICATIONS  (PRN):  desmopressin IVPB 20 MICROGram(s) IV Intermittent once PRN ON CALL for pericardiocentesis      LABS                                            8.3                   Neurophils% (auto):   79.3   (12-20 @ 08:14):    8.19 )-----------(198          Lymphocytes% (auto):  11.4                                          25.9                   Eosinphils% (auto):   1.2      Manual%: Neutrophils x    ; Lymphocytes x    ; Eosinophils x    ; Bands%: x    ; Blasts x                                    145    |  111    |  103                 Calcium: 9.6   / iCa: x      (12-20 @ 06:37)    ----------------------------<  98        Magnesium: x                                5.2     |  19     |  7.92             Phosphorous: x          ( 12-19 @ 20:36 )   PT: 11.7 sec;   INR: 1.03 ratio  aPTT: 30.0 sec        ASSESSMENT & PLAN:  86 y/o M-patient apparently with a history of gastric CA with patient consistently, declining operative and standard chemotherapy options with progressive B/L LE oedema with progressive azotemia and with suprapubic fullness. CT abd- Large pericardial effusion and small left pleural effusion. and additionally  Bilateral hydroureteronephrosis, severe on the left and moderate on the right, to the level of the urinary bladder. Thickening of the gastric wall in the region of the fundus likely   corresponds with patient's known gastric cancer. Transferred from floors s/p pericardial drain for tamponade physiology      Krish Mendez, [facility]  [id number]

## 2018-12-20 NOTE — CONSULT NOTE ADULT - SUBJECTIVE AND OBJECTIVE BOX
Initial Cardiology Attending Consult    CHIEF COMPLAINT:      HISTORY OF PRESENT ILLNESS:  DANIELLA CASEY is a 85y Male patient PMH gastric CA on a nonstandard treatment who is presenting with  progressive le edema, azotemia and  suprapubic fullness. On ED CT scan to evaluate for METS he was found to have a large pericardial effusion STAT ECHO demonstrated echocardiographic findings of tamponade.  Cardiology is now consulted for further evaluation.     Allergies    No Known Allergies    Intolerances    	    PAST MEDICAL & SURGICAL HISTORY:  Benign prostatic hyperplasia, unspecified whether lower urinary tract symptoms present  Hypothyroidism, unspecified type  Malignant neoplasm of stomach, unspecified location  No significant past surgical history      FAMILY HISTORY:  No pertinent family history in first degree relatives      SOCIAL HISTORY:    [ ] Non-smoker  [x ] Smoker  [ ] Alcohol      REVIEW OF SYSTEMS:  CONSTITUTIONAL: No fever, +weight loss, +fatigue  EYES: No eye pain, visual disturbances, or discharge  ENMT:  No difficulty hearing, tinnitus, vertigo; No sinus or throat pain  NECK: No pain or stiffness  RESPIRATORY: No cough, wheezing, chills or hemoptysis; +Shortness of Breath  CARDIOVASCULAR: No chest pain, palpitations, passing out, dizziness, +leg swelling  GASTROINTESTINAL: No abdominal or epigastric pain. No nausea, vomiting, or hematemesis; No diarrhea or constipation. No melena or hematochezia.  GENITOURINARY: No dysuria, reduced frequency, suprapubic fullness, no hematuria, or incontinence  NEUROLOGICAL: No headaches, memory loss, loss of strength, numbness, or tremors  SKIN: No itching, burning, rashes, or lesions   LYMPH Nodes: No enlarged glands  ENDOCRINE: No heat or cold intolerance; No hair loss  MUSCULOSKELETAL: No joint pain or swelling; No muscle, back, or extremity pain  PSYCHIATRIC: No depression, anxiety, mood swings, or difficulty sleeping  HEME/LYMPH: No easy bruising, or bleeding gums  ALLERY AND IMMUNOLOGIC: No hives or eczema	    [ ] All others negative	  [ ] Unable to obtain    PHYSICAL EXAM:  I&O's Summary    19 Dec 2018 07:01  -  20 Dec 2018 02:27  --------------------------------------------------------  IN: 0 mL / OUT: 1500 mL / NET: -1500 mL  Vital Signs Last 24 Hrs  T(C): 37.3 (20 Dec 2018 00:55), Max: 37.3 (20 Dec 2018 00:55)  T(F): 99.1 (20 Dec 2018 00:55), Max: 99.1 (20 Dec 2018 00:55)  HR: 68 (20 Dec 2018 00:55) (60 - 68)  BP: 179/69 (20 Dec 2018 00:55) (169/58 - 184/65)  BP(mean): --  RR: 18 (20 Dec 2018 00:55) (16 - 18)  SpO2: 99% (20 Dec 2018 00:55) (94% - 100%)      Appearance: M Lying flat in bed NAD	  HEENT:   Normal oral mucosa, PERRL, EOMI	  Lymphatic: No lymphadenopathy  Cardiovascular: distant heart sounds, No JVD, No murmurs, +1LE edema  Respiratory: reduced br sounds   Psychiatry: A & O x 3, Mood & affect appropriate  Gastrointestinal:  Soft, Non-tender, + BS	  Skin: No rashes, No ecchymoses, No cyanosis	  Neurologic: Non-focal  Extremities: Normal range of motion, No clubbing, cyanosis ,+1LE edema  Vascular: Peripheral pulses palpable 2+ bilaterally    MEDICATIONS:  Home Medications:  finasteride 5 mg oral tablet: 1 tab(s) orally once a day (19 Dec 2018 19:10)  pravastatin 10 mg oral tablet: 1 tab(s) orally once a day (19 Dec 2018 19:10)  Synthroid 75 mcg (0.075 mg) oral tablet: 1 tab(s) orally once a day (19 Dec 2018 19:10)    LABS:	 	    CBC Full  -  ( 19 Dec 2018 15:21 )  WBC Count : 8.1 K/uL  Hemoglobin : 8.5 g/dL  Hematocrit : 24.0 %  Platelet Count - Automated : 224 K/uL  Mean Cell Volume : 91.2 fl  Mean Cell Hemoglobin : 32.2 pg  Mean Cell Hemoglobin Concentration : 35.3 gm/dL  Auto Neutrophil # : 5.6 K/uL  Auto Lymphocyte # : 1.4 K/uL  Auto Monocyte # : 0.7 K/uL  Auto Eosinophil # : 0.4 K/uL  Auto Basophil # : 0.0 K/uL  Auto Neutrophil % : 68.8 %  Auto Lymphocyte % : 17.7 %  Auto Monocyte % : 8.7 %  Auto Eosinophil % : 4.4 %  Auto Basophil % : 0.5 %    12-19    141  |  107  |  96<H>  ----------------------------<  100<H>  4.9   |  17<L>  |  7.62<H>    Ca    9.6      19 Dec 2018 15:21    TPro  6.6  /  Alb  3.8  /  TBili  0.2  /  DBili  x   /  AST  10  /  ALT  7<L>  /  AlkPhos  103  12-19      proBNP:   Lipid Profile:   HgA1c:   TSH:     TROPONIN:        TELEMETRY: 	SR no events    ECG:  	not in chart  RADIOLOGY:  OTHER: 	    CARDIAC TESTING/STUDIES:    [ ] Echocardiogram:    EF (Visual Estimate): 65-70 %  Doppler Peak Velocity (m/sec): AoV=1.6  ------------------------------------------------------------------------  Observations:  Mitral Valve: Normal mitral valve. Mild mitral  regurgitation.   Aortic Valve/Aorta: Calcified trileaflet aortic valve with  decreased opening. Peak transaortic valve gradient equals  10 mm Hg, estimated aortic valve area equals 1.8 sqcm (by  continuity equation), aortic valve velocity time integral  equals 40 cm, consistent with mild aortic stenosis.  Mild-moderate aortic regurgitation. Peak left ventricular  outflow tract gradient equals 4 mm Hg, LVOT velocity time  integral equals 23 cm.  Normal aortic root size. (Ao: 2.9 cm at the sinuses of  Valsalva).  Left Atrium: Normal left atrium.  LA volume index = 29  cc/m2.  Left Ventricle: Normal left ventricular systolic function.  No segmental wall motion abnormalities. Normal left  ventricular internal dimensions and wall thicknesses.  Indeterminate diastolic function.  Right Heart: Normal right atrium. Normal right ventricular  size and function. Normal tricuspid valve. Mild tricuspid  regurgitation. Pulmonic valve not well visualized, probably  normal. Minimal pulmonic regurgitation.  Pericardium/Pleura: Large circumfrential pericardial  effusion. The largest pocket measures 1.8cm.   There is significant respiratory varaiation of the mitral  and tricuspid inflow.   There is partial collapse of the right atrium that is best  seen on subcostal views.   Bilateral pleural effusions.  Hemodynamic: Estimated right ventricular systolic pressure  equals 31.36 - 36.36 mm Hg, assuming right atrial pressure  equals 0 - 5 mm Hg, consistent with borderline pulmonary  hypertension. Color Doppler demonstrates no evidence of a  patent foramen ovale.  ------------------------------------------------------------------------  Conclusions:   1. Normal mitral valve. Mild mitral regurgitation.   2. Calcified trileaflet aortic valve with decreased  opening. Peak transaortic valve gradient equals 10 mm Hg,  estimated aortic valve area equals 1.8 sqcm (by continuity  equation), aortic valve velocity time integral equals 40  cm, consistent with mild aortic stenosis. Mild-moderate  aortic regurgitation.  3. Normal left ventricular systolic function. No segmental  wall motion abnormalities.  4. Normal right ventricular size and function.  5. Normal tricuspid valve. Mild tricuspid regurgitation.  6. Large circumferential pericardial effusion. The largest  pocket measures 1.8cm.   There is significant respiratory variation of the mitral  and tricuspid inflow.   There is partial collapse of the right atrium that is best  seen on subcostal views.   7. Bilateral pleural effusions.  Results communicated to Dr. Michaels.   ------------------------------------------------------------------------  Confirmed on  12/19/2018 - 20:53:50 by Horacio Sexton M.D.  ------------------------------------------------------------------------    [ ]  Catheterization:  [ ] Stress Test:  	  	  ASSESSMENT/PLAN: 	      DANIELLA CASEY is an 85y Male patient PMH gastric CA on a nonstandard treatment who is presenting with  progressive le edema, azotemia and  suprapubic fullness. labs demonstrate worseningn renal function. On ED CT scan to evaluate for METS he was found to have a large pericardial effusion STAT ECHO demonstrated echocardiographic findings of tamponade.  Cardiology is now consulted for further evaluation.     review of his echo demonstrates echo findings of tamponade with RA compression. however he is not demonstrating clinical findings of cardiac tamponade.  Discussed pericardiocentesis with him and he is agreeable, may send cytology of pericardial fluid as well.     Please keep NPO after midnight for pericardiocentesis.    cardiology will follow    Horacio Sexton MD, PhD  Cardiology Attending  [facility]/ [facility]  [phone]    (Cardiology Nocturnist cell number available 7 pm - 7 am every night; available daytime week days for follow-up only; daytime weekends covered by general cardiology consult service)

## 2018-12-20 NOTE — PROGRESS NOTE ADULT - ASSESSMENT
gastric  ca-nostandard tx-leg  edema-acute kidney  failure-pericardial  effusion-case dw  er and  hospitalist    last evening-  nocturnal cardiolgist called to  see  pt last night -case dw    renal also - for pericardiocentesis   today

## 2018-12-20 NOTE — CONSULT NOTE ADULT - SUBJECTIVE AND OBJECTIVE BOX
[facility]   [phone]  RENAL FOLLOW UP NOTE  --------------------------------------------------------------------------------  Chief Complaint:    24 hour events/subjective:      HPI:       86 y/o M-patient apparently with a history of gastric CA with patient consistently declining operative and standard chemotherapy options, with patient apparently on a mistletoe treatment (?) as a nonstandard approach with a group in [place] (?) following an evaluation in [place]--patient with progressive B/L LE oedema with progressive azotemia and with suprapubic fullness.   Patient with reported history of unspecified prostate disorder and follows Dr. Umaña above.  Apparently patient is now reconsidering treatment by Dr. Pacheco was noticed with worsening renal function. seen in office yesterday for abn creatinine. was hyperkalemic and cr 7 sent to er for staging and r/o obstruction. also with large pericardial effusion.   renal consult called.         PAST HISTORY  --------------------------------------------------------------------------------  No significant changes to PMH, PSH, FHx, SHx, unless otherwise noted    ALLERGIES & MEDICATIONS  --------------------------------------------------------------------------------  Allergies    No Known Allergies    Intolerances      Standing Inpatient Medications  acetaminophen   Tablet .. 650 milliGRAM(s) Oral once  influenza   Vaccine 0.5 milliLiter(s) IntraMuscular once  levothyroxine 75 MICROGram(s) Oral daily  sodium bicarbonate 650 milliGRAM(s) Oral four times a day    PRN Inpatient Medications  desmopressin IVPB 20 MICROGram(s) IV Intermittent once PRN      REVIEW OF SYSTEMS  --------------------------------------------------------------------------------    Gen: denies , fevers/chills,  CVS: denies chest pain/palpitations  Resp: denies SOB/Cough  GI: Denies N/V/Abd pain  : Denies dysuria/oliguria/hematuria  nno itching of rash no other new c/o are offered except fatigue     All other systems were reviewed and are negative, except as noted.    VITALS/PHYSICAL EXAM  --------------------------------------------------------------------------------  T(C): 36.8 (12-20-18 @ 11:40), Max: 37.3 (12-20-18 @ 00:55)  HR: 71 (12-20-18 @ 11:40) (60 - 71)  BP: 183/65 (12-20-18 @ 11:40) (169/66 - 184/65)  RR: 17 (12-20-18 @ 11:40) (16 - 18)  SpO2: 99% (12-20-18 @ 11:40) (94% - 100%)  Wt(kg): --  Height (cm): 165.1 (12-19-18 @ 13:50)  Weight (kg): 65.8 (12-19-18 @ 13:50)  BMI (kg/m2): 24.1 (12-19-18 @ 13:50)  BSA (m2): 1.73 (12-19-18 @ 13:50)      12-19-18 @ 07:01  -  12-20-18 @ 07:00  --------------------------------------------------------  IN: 0 mL / OUT: 3250 mL / NET: -3250 mL    12-20-18 @ 07:01  -  12-20-18 @ 14:48  --------------------------------------------------------  IN: 900 mL / OUT: 1200 mL / NET: -300 mL      Physical Exam:  	    Physical Exam:  	Gen: alert oriented place pale appearing. speaks limited english   	Pulm: Decreased breath sounds b/l bases. no rales or ronchi or wheezing  	CV: RRR, S1/S2. no rub  	Back: No CVA tenderness  	Abd: +BS, soft, nontender/nondistended  	: No suprapubic tenderness.               Extremity: No cyanosis, no edema no clubbing  	  LABS/STUDIES  --------------------------------------------------------------------------------              8.3    8.19  >-----------<  198      [12-20-18 @ 08:14]              25.9     145  |  111  |  103  ----------------------------<  98      [12-20-18 @ 06:37]  5.2   |  19  |  7.92        Ca     9.6     [12-20-18 @ 06:37]    TPro  6.6  /  Alb  3.8  /  TBili  0.2  /  DBili  x   /  AST  10  /  ALT  7   /  AlkPhos  103  [12-19-18 @ 15:21]    PT/INR: PT 11.7 , INR 1.03       [12-19-18 @ 20:36]  PTT: 30.0       [12-19-18 @ 20:36]      Creatinine Trend:  SCr 7.92 [12-20 @ 06:37]  SCr 7.62 [12-19 @ 15:21]              Urinalysis - [12-19-18 @ 15:45]      Color Colorless / Appearance Clear / SG 1.012 / pH 6.5      Gluc Negative / Ketone Negative  / Bili Negative / Urobili Negative       Blood Negative / Protein 30 mg/dL / Leuk Est Negative / Nitrite Negative      RBC 5 / WBC 1 / Hyaline 1 / Gran  / Sq Epi  / Non Sq Epi 0 / Bacteria Negative    Urine Creatinine 24      [12-19-18 @ 21:46]  Urine Protein 630      [12-19-18 @ 21:46]    TSH 8.92      [12-20-18 @ 08:18]      < from: CT Abdomen and Pelvis No Cont (12.19.18 @ 16:49) >    EXAM:  CT ABDOMEN AND PELVIS                          EXAM:  CT CHEST                            PROCEDURE DATE:  12/19/2018            INTERPRETATION:  CLINICAL INFORMATION: Abdominal pain. Gastric cancer.   Elevated creatinine.  Evaluate for metastatic disease.    COMPARISON: CT abdomen pelvis 12/23/2016. CT chest 8/6/2017.    PROCEDURE:   CT of the Chest, Abdomen and Pelvis was performed without intravenous   contrast.   Intravenous contrast: None.  Oral contrast: None.  Sagittal and coronalreformats were performed.    FINDINGS:    CHEST:     LUNGS AND LARGE AIRWAYS: Patent central airways.  Calcified granuloma in   the left upper lobe. Bibasilar and lingular atelectasis.  PLEURA: Small left pleural effusion.  VESSELS: Coronary artery and aortic calcifications.  HEART: Heart size is normal. Large pericardial effusion.  MEDIASTINUM AND DANIELLE: Calcified mediastinal lymph nodes, unchanged.  CHEST WALL AND LOWER NECK: Within normal limits.    ABDOMEN AND PELVIS:    Evaluation of the solid viscera is limited without intravenous contrast.    LIVER: Within normal limits.  BILE DUCTS: Normal caliber.  GALLBLADDER: Cholelithiasis.  SPLEEN: Within normal limits.  PANCREAS: Within normal limits.  ADRENALS: Within normal limits.  KIDNEYS/URETERS: There is bilateral hydroureteronephrosis, with   dilatation of the ureters to the level of the urinary bladder, moderate   on the right and severe on the left.     BLADDER: The urinary bladder is collapsed around a Byrnes catheter. There   is concentric urinary bladder wall thickening and perivesicular   stranding. Several calculi within the urinary bladder.  REPRODUCTIVE ORGANS: The prostate is enlarged.    BOWEL: Thickening of the gastric wall in the region of the fundus likely   corresponds with patient's known gastric cancer. No bowel obstruction.   Appendix is normal.  PERITONEUM: Trace pelvic free fluid.  VESSELS:  Atherosclerotic calcification.  RETROPERITONEUM: Gastrohepatic ligament adenopathy, measuring 1.7 x 1.5   cm (2:73).     ABDOMINAL WALL: Within normal limits.  BONES: Multilevel degenerative changes.    IMPRESSION:     Large pericardial effusion and small left pleural effusion.    Bilateral hydroureteronephrosis, severe on the left and moderate on the   right, to the level of the urinary bladder. Small bladder calculi are   noted in the region of the ureterovesical junction on the left. Marked   urinary bladder wall thickening and perivesicular stranding may be   related to chronic outlet obstruction from an enlarged prostate.   Correlate with urinalysis to evaluate for cystitis. Underlying bladder   neoplasm cannot be excluded.    Thickening of the gastric wall in the region of the fundus likely   corresponds with patient's known gastric cancer. In addition, there is   gastro- hepatic ligament adenopathy. Correlation with prior imaging is   recommended.                      ALLISON GREEN M.D., RADIOLOGY FELLOW  This document has been electronically signed.  LILIAN EDWARDS M.D., ATTENDING RADIOLOGIST  This document has been electronically signed. Dec 19 2018  5:15PM              < end of copied text >

## 2018-12-21 DIAGNOSIS — D63.0 ANEMIA IN NEOPLASTIC DISEASE: ICD-10-CM

## 2018-12-21 DIAGNOSIS — Z29.9 ENCOUNTER FOR PROPHYLACTIC MEASURES, UNSPECIFIED: ICD-10-CM

## 2018-12-21 LAB
% ALBUMIN: 56.6 % — SIGNIFICANT CHANGE UP
% ALPHA 1: 6.9 % — SIGNIFICANT CHANGE UP
% ALPHA 2: 14.9 % — SIGNIFICANT CHANGE UP
% BETA: 10.8 % — SIGNIFICANT CHANGE UP
% GAMMA: 10.8 % — SIGNIFICANT CHANGE UP
ALBUMIN SERPL ELPH-MCNC: 2.9 G/DL — LOW (ref 3.6–5.5)
ALBUMIN SERPL ELPH-MCNC: 3.1 G/DL — LOW (ref 3.3–5)
ALBUMIN/GLOB SERPL ELPH: 1.3 RATIO — SIGNIFICANT CHANGE UP
ALP SERPL-CCNC: 81 U/L — SIGNIFICANT CHANGE UP (ref 40–120)
ALPHA1 GLOB SERPL ELPH-MCNC: 0.4 G/DL — SIGNIFICANT CHANGE UP (ref 0.1–0.4)
ALPHA2 GLOB SERPL ELPH-MCNC: 0.8 G/DL — SIGNIFICANT CHANGE UP (ref 0.5–1)
ALT FLD-CCNC: 6 U/L — LOW (ref 10–45)
ANION GAP SERPL CALC-SCNC: 14 MMOL/L — SIGNIFICANT CHANGE UP (ref 5–17)
ANION GAP SERPL CALC-SCNC: 14 MMOL/L — SIGNIFICANT CHANGE UP (ref 5–17)
ANION GAP SERPL CALC-SCNC: 16 MMOL/L — SIGNIFICANT CHANGE UP (ref 5–17)
AST SERPL-CCNC: 10 U/L — SIGNIFICANT CHANGE UP (ref 10–40)
B-GLOBULIN SERPL ELPH-MCNC: 0.6 G/DL — SIGNIFICANT CHANGE UP (ref 0.5–1)
BASOPHILS # BLD AUTO: 0 K/UL — SIGNIFICANT CHANGE UP (ref 0–0.2)
BASOPHILS NFR BLD AUTO: 0.1 % — SIGNIFICANT CHANGE UP (ref 0–2)
BASOPHILS NFR BLD AUTO: 0.3 % — SIGNIFICANT CHANGE UP (ref 0–2)
BASOPHILS NFR BLD AUTO: 0.4 % — SIGNIFICANT CHANGE UP (ref 0–2)
BILIRUB SERPL-MCNC: 0.2 MG/DL — SIGNIFICANT CHANGE UP (ref 0.2–1.2)
BUN SERPL-MCNC: 94 MG/DL — HIGH (ref 7–23)
BUN SERPL-MCNC: 97 MG/DL — HIGH (ref 7–23)
BUN SERPL-MCNC: 99 MG/DL — HIGH (ref 7–23)
CALCIUM SERPL-MCNC: 8.7 MG/DL — SIGNIFICANT CHANGE UP (ref 8.4–10.5)
CALCIUM SERPL-MCNC: 9 MG/DL — SIGNIFICANT CHANGE UP (ref 8.4–10.5)
CALCIUM SERPL-MCNC: 9.1 MG/DL — SIGNIFICANT CHANGE UP (ref 8.4–10.5)
CHLORIDE SERPL-SCNC: 102 MMOL/L — SIGNIFICANT CHANGE UP (ref 96–108)
CHLORIDE SERPL-SCNC: 106 MMOL/L — SIGNIFICANT CHANGE UP (ref 96–108)
CHLORIDE SERPL-SCNC: 108 MMOL/L — SIGNIFICANT CHANGE UP (ref 96–108)
CO2 SERPL-SCNC: 18 MMOL/L — LOW (ref 22–31)
CO2 SERPL-SCNC: 19 MMOL/L — LOW (ref 22–31)
CO2 SERPL-SCNC: 21 MMOL/L — LOW (ref 22–31)
COMMENT - FLUIDS: SIGNIFICANT CHANGE UP
CREAT ?TM UR-MCNC: 47 MG/DL — SIGNIFICANT CHANGE UP
CREAT SERPL-MCNC: 6.99 MG/DL — HIGH (ref 0.5–1.3)
CREAT SERPL-MCNC: 7.04 MG/DL — HIGH (ref 0.5–1.3)
CREAT SERPL-MCNC: 7.09 MG/DL — HIGH (ref 0.5–1.3)
EOSINOPHIL # BLD AUTO: 0 K/UL — SIGNIFICANT CHANGE UP (ref 0–0.5)
EOSINOPHIL # BLD AUTO: 0.1 K/UL — SIGNIFICANT CHANGE UP (ref 0–0.5)
EOSINOPHIL # BLD AUTO: 0.1 K/UL — SIGNIFICANT CHANGE UP (ref 0–0.5)
EOSINOPHIL NFR BLD AUTO: 0 % — SIGNIFICANT CHANGE UP (ref 0–6)
EOSINOPHIL NFR BLD AUTO: 0.8 % — SIGNIFICANT CHANGE UP (ref 0–6)
EOSINOPHIL NFR BLD AUTO: 0.9 % — SIGNIFICANT CHANGE UP (ref 0–6)
GAMMA GLOBULIN: 0.6 G/DL — SIGNIFICANT CHANGE UP (ref 0.6–1.6)
GLUCOSE SERPL-MCNC: 114 MG/DL — HIGH (ref 70–99)
GLUCOSE SERPL-MCNC: 121 MG/DL — HIGH (ref 70–99)
GLUCOSE SERPL-MCNC: 258 MG/DL — HIGH (ref 70–99)
HCT VFR BLD CALC: 21.5 % — LOW (ref 39–50)
HCT VFR BLD CALC: 21.7 % — LOW (ref 39–50)
HCT VFR BLD CALC: 23.5 % — LOW (ref 39–50)
HGB BLD-MCNC: 7.4 G/DL — LOW (ref 13–17)
HGB BLD-MCNC: 7.6 G/DL — LOW (ref 13–17)
HGB BLD-MCNC: 7.7 G/DL — LOW (ref 13–17)
INTERPRETATION SERPL IFE-IMP: SIGNIFICANT CHANGE UP
IRON SATN MFR SERPL: 11 UG/DL — LOW (ref 45–165)
IRON SATN MFR SERPL: 7 % — LOW (ref 16–55)
LYMPHOCYTES # BLD AUTO: 0.7 K/UL — LOW (ref 1–3.3)
LYMPHOCYTES # BLD AUTO: 0.7 K/UL — LOW (ref 1–3.3)
LYMPHOCYTES # BLD AUTO: 0.9 K/UL — LOW (ref 1–3.3)
LYMPHOCYTES # BLD AUTO: 10.4 % — LOW (ref 13–44)
LYMPHOCYTES # BLD AUTO: 7.6 % — LOW (ref 13–44)
LYMPHOCYTES # BLD AUTO: 8.5 % — LOW (ref 13–44)
MAGNESIUM SERPL-MCNC: 1.7 MG/DL — SIGNIFICANT CHANGE UP (ref 1.6–2.6)
MAGNESIUM SERPL-MCNC: 2.2 MG/DL — SIGNIFICANT CHANGE UP (ref 1.6–2.6)
MCHC RBC-ENTMCNC: 29.4 PG — SIGNIFICANT CHANGE UP (ref 27–34)
MCHC RBC-ENTMCNC: 31.5 PG — SIGNIFICANT CHANGE UP (ref 27–34)
MCHC RBC-ENTMCNC: 32.5 GM/DL — SIGNIFICANT CHANGE UP (ref 32–36)
MCHC RBC-ENTMCNC: 32.5 PG — SIGNIFICANT CHANGE UP (ref 27–34)
MCHC RBC-ENTMCNC: 34.3 GM/DL — SIGNIFICANT CHANGE UP (ref 32–36)
MCHC RBC-ENTMCNC: 36.1 GM/DL — HIGH (ref 32–36)
MCV RBC AUTO: 90 FL — SIGNIFICANT CHANGE UP (ref 80–100)
MCV RBC AUTO: 90.6 FL — SIGNIFICANT CHANGE UP (ref 80–100)
MCV RBC AUTO: 91.7 FL — SIGNIFICANT CHANGE UP (ref 80–100)
MONOCYTES # BLD AUTO: 0.6 K/UL — SIGNIFICANT CHANGE UP (ref 0–0.9)
MONOCYTES # BLD AUTO: 0.8 K/UL — SIGNIFICANT CHANGE UP (ref 0–0.9)
MONOCYTES # BLD AUTO: 0.9 K/UL — SIGNIFICANT CHANGE UP (ref 0–0.9)
MONOCYTES NFR BLD AUTO: 7.6 % — SIGNIFICANT CHANGE UP (ref 2–14)
MONOCYTES NFR BLD AUTO: 8.8 % — SIGNIFICANT CHANGE UP (ref 2–14)
MONOCYTES NFR BLD AUTO: 9.6 % — SIGNIFICANT CHANGE UP (ref 2–14)
NEUTROPHILS # BLD AUTO: 6.8 K/UL — SIGNIFICANT CHANGE UP (ref 1.8–7.4)
NEUTROPHILS # BLD AUTO: 7 K/UL — SIGNIFICANT CHANGE UP (ref 1.8–7.4)
NEUTROPHILS # BLD AUTO: 7.4 K/UL — SIGNIFICANT CHANGE UP (ref 1.8–7.4)
NEUTROPHILS NFR BLD AUTO: 81.4 % — HIGH (ref 43–77)
NEUTROPHILS NFR BLD AUTO: 81.7 % — HIGH (ref 43–77)
NEUTROPHILS NFR BLD AUTO: 81.8 % — HIGH (ref 43–77)
NIGHT BLUE STAIN TISS: SIGNIFICANT CHANGE UP
PHOSPHATE SERPL-MCNC: 2.8 MG/DL — SIGNIFICANT CHANGE UP (ref 2.5–4.5)
PHOSPHATE SERPL-MCNC: 3.2 MG/DL — SIGNIFICANT CHANGE UP (ref 2.5–4.5)
PLATELET # BLD AUTO: 203 K/UL — SIGNIFICANT CHANGE UP (ref 150–400)
PLATELET # BLD AUTO: 205 K/UL — SIGNIFICANT CHANGE UP (ref 150–400)
PLATELET # BLD AUTO: 212 K/UL — SIGNIFICANT CHANGE UP (ref 150–400)
POTASSIUM SERPL-MCNC: 4.7 MMOL/L — SIGNIFICANT CHANGE UP (ref 3.5–5.3)
POTASSIUM SERPL-MCNC: 4.9 MMOL/L — SIGNIFICANT CHANGE UP (ref 3.5–5.3)
POTASSIUM SERPL-MCNC: 5 MMOL/L — SIGNIFICANT CHANGE UP (ref 3.5–5.3)
POTASSIUM SERPL-SCNC: 4.7 MMOL/L — SIGNIFICANT CHANGE UP (ref 3.5–5.3)
POTASSIUM SERPL-SCNC: 4.9 MMOL/L — SIGNIFICANT CHANGE UP (ref 3.5–5.3)
POTASSIUM SERPL-SCNC: 5 MMOL/L — SIGNIFICANT CHANGE UP (ref 3.5–5.3)
PROT ?TM UR-MCNC: 36 MG/DL — HIGH (ref 0–12)
PROT PATTERN SERPL ELPH-IMP: SIGNIFICANT CHANGE UP
PROT SERPL-MCNC: 5.1 G/DL — LOW (ref 6–8.3)
PROT SERPL-MCNC: 5.1 G/DL — LOW (ref 6–8.3)
PROT SERPL-MCNC: 5.5 G/DL — LOW (ref 6–8.3)
PROT/CREAT UR-RTO: 0.8 RATIO — HIGH (ref 0–0.2)
RBC # BLD: 2.36 M/UL — LOW (ref 4.2–5.8)
RBC # BLD: 2.39 M/UL — LOW (ref 4.2–5.8)
RBC # BLD: 2.59 M/UL — LOW (ref 4.2–5.8)
RBC # FLD: 12.3 % — SIGNIFICANT CHANGE UP (ref 10.3–14.5)
RBC # FLD: 12.5 % — SIGNIFICANT CHANGE UP (ref 10.3–14.5)
RBC # FLD: 12.5 % — SIGNIFICANT CHANGE UP (ref 10.3–14.5)
RH IG SCN BLD-IMP: POSITIVE — SIGNIFICANT CHANGE UP
SODIUM SERPL-SCNC: 136 MMOL/L — SIGNIFICANT CHANGE UP (ref 135–145)
SODIUM SERPL-SCNC: 141 MMOL/L — SIGNIFICANT CHANGE UP (ref 135–145)
SODIUM SERPL-SCNC: 141 MMOL/L — SIGNIFICANT CHANGE UP (ref 135–145)
SPECIMEN SOURCE: SIGNIFICANT CHANGE UP
TIBC SERPL-MCNC: 157 UG/DL — LOW (ref 220–430)
TRANSFERRIN SERPL-MCNC: 119 MG/DL — LOW (ref 200–360)
UIBC SERPL-MCNC: 146 UG/DL — SIGNIFICANT CHANGE UP (ref 110–370)
WBC # BLD: 8.4 K/UL — SIGNIFICANT CHANGE UP (ref 3.8–10.5)
WBC # BLD: 8.6 K/UL — SIGNIFICANT CHANGE UP (ref 3.8–10.5)
WBC # BLD: 9 K/UL — SIGNIFICANT CHANGE UP (ref 3.8–10.5)
WBC # FLD AUTO: 8.4 K/UL — SIGNIFICANT CHANGE UP (ref 3.8–10.5)
WBC # FLD AUTO: 8.6 K/UL — SIGNIFICANT CHANGE UP (ref 3.8–10.5)
WBC # FLD AUTO: 9 K/UL — SIGNIFICANT CHANGE UP (ref 3.8–10.5)

## 2018-12-21 PROCEDURE — 99233 SBSQ HOSP IP/OBS HIGH 50: CPT | Mod: GC

## 2018-12-21 PROCEDURE — 93308 TTE F-UP OR LMTD: CPT | Mod: 26

## 2018-12-21 PROCEDURE — 93321 DOPPLER ECHO F-UP/LMTD STD: CPT | Mod: 26

## 2018-12-21 PROCEDURE — 93010 ELECTROCARDIOGRAM REPORT: CPT

## 2018-12-21 RX ORDER — FERROUS SULFATE 325(65) MG
325 TABLET ORAL
Qty: 0 | Refills: 0 | Status: DISCONTINUED | OUTPATIENT
Start: 2018-12-21 | End: 2018-12-26

## 2018-12-21 RX ORDER — TAMSULOSIN HYDROCHLORIDE 0.4 MG/1
0.4 CAPSULE ORAL AT BEDTIME
Qty: 0 | Refills: 0 | Status: DISCONTINUED | OUTPATIENT
Start: 2018-12-21 | End: 2018-12-29

## 2018-12-21 RX ORDER — OXYCODONE AND ACETAMINOPHEN 5; 325 MG/1; MG/1
1 TABLET ORAL ONCE
Qty: 0 | Refills: 0 | Status: DISCONTINUED | OUTPATIENT
Start: 2018-12-21 | End: 2018-12-21

## 2018-12-21 RX ORDER — MAGNESIUM SULFATE 500 MG/ML
2 VIAL (ML) INJECTION ONCE
Qty: 0 | Refills: 0 | Status: COMPLETED | OUTPATIENT
Start: 2018-12-21 | End: 2018-12-21

## 2018-12-21 RX ADMIN — OXYCODONE AND ACETAMINOPHEN 1 TABLET(S): 5; 325 TABLET ORAL at 14:35

## 2018-12-21 RX ADMIN — Medication 50 GRAM(S): at 05:11

## 2018-12-21 RX ADMIN — Medication 650 MILLIGRAM(S): at 18:09

## 2018-12-21 RX ADMIN — Medication 650 MILLIGRAM(S): at 13:11

## 2018-12-21 RX ADMIN — Medication 650 MILLIGRAM(S): at 23:06

## 2018-12-21 RX ADMIN — TAMSULOSIN HYDROCHLORIDE 0.4 MILLIGRAM(S): 0.4 CAPSULE ORAL at 23:06

## 2018-12-21 RX ADMIN — OXYCODONE AND ACETAMINOPHEN 1 TABLET(S): 5; 325 TABLET ORAL at 00:29

## 2018-12-21 RX ADMIN — Medication 75 MICROGRAM(S): at 05:11

## 2018-12-21 RX ADMIN — OXYCODONE AND ACETAMINOPHEN 1 TABLET(S): 5; 325 TABLET ORAL at 13:11

## 2018-12-21 RX ADMIN — OXYCODONE AND ACETAMINOPHEN 1 TABLET(S): 5; 325 TABLET ORAL at 01:06

## 2018-12-21 RX ADMIN — Medication 650 MILLIGRAM(S): at 05:11

## 2018-12-21 NOTE — PROGRESS NOTE ADULT - ASSESSMENT
84 yo man with  history of gastric CA  has never tx'd with chemotherapy and  was sent to ER for staging. CT showed large pericardial effusion. Pt is now in PICU with a drain in chest draining pericardial fluid.    12-21-18: R/O iron deficiency: check ferritin, b12/ folate

## 2018-12-21 NOTE — DIETITIAN INITIAL EVALUATION ADULT. - NS AS NUTRI INTERV MEDICAL AND FOOD SUPPLEMENTS
Recommend d/c Ensure Enlive and change to Nepro x 3 daily for additional 1275 kcal, 57 grams protein daily.

## 2018-12-21 NOTE — DIETITIAN INITIAL EVALUATION ADULT. - ETIOLOGY
suspect limited prior exposure to nutrition-related education, pt eating foods as tolerated decreased appetite/altered GI function in setting of catabolic illness

## 2018-12-21 NOTE — CHART NOTE - NSCHARTNOTEFT_GEN_A_CORE
Upon Nutritional Assessment by the Registered Dietitian your patient was determined to meet criteria / has evidence of the following diagnosis/diagnoses:          [ ]  Mild Protein Calorie Malnutrition        [ ]  Moderate Protein Calorie Malnutrition        [x] Severe Protein Calorie Malnutrition        [ ] Unspecified Protein Calorie Malnutrition        [ ] Underweight / BMI <19        [ ] Morbid Obesity / BMI > 40      Findings as based on:  [x] Comprehensive nutrition assessment   [x] Nutrition Focused Physical Exam  [x] Other: significant wt loss x 1 year      Nutrition Plan/Recommendations:      Please see RD initial assessment for interventions and recommendations     PROVIDER Section:     By signing this assessment you are acknowledging and agree with the diagnosis/diagnoses assigned by the Registered Dietitian    Comments:

## 2018-12-21 NOTE — DIETITIAN INITIAL EVALUATION ADULT. - SIGNS/SYMPTOMS
pt exhibits knowledge deficit re: high phosphorous and potassium foods, sodium intake, fluid intake NFPE findings, wt loss 18.8%/30 pounds x1 year, variable po, c/o "stomach problems," gastric CA hx

## 2018-12-21 NOTE — DIETITIAN INITIAL EVALUATION ADULT. - DIET TYPE
low sodium/no concentrated phosphorus/soft/Ensure Enlive x 2 (700 kcal, 40 grams protein)/no concentrated potassium/DASH/TLC (sodium and cholesterol restricted diet)

## 2018-12-21 NOTE — DIETITIAN INITIAL EVALUATION ADULT. - OTHER INFO
Pt seen for consult: nutrition assessment. Pt seen for consult: nutrition assessment. Pt reports fair po intake at this time, states he consumed cereal and fruit for breakfast this morning. Denies GI distress no N+V, diarrhea or constipation. Pt does endorse intermittent "on-and off stomach problems" which he suspects is r/t his cancer, states it's hard for him to tolerate heavier meats, eggs, and brown rice but today he had no issues at breakfast. Pt endorses wt loss, he reports 15 pound loss x 1 year, however based off of current weight 130 pounds and stated UBW of 160 pounds, pt lost 30 pounds x 1 year. Pt exhibits some understanding of lower potassium foods, states "I was told not to eat banana and orange due to the potassium" but is amenable to further education on what he should eat. Pt tried the Nepro and enjoyed it, would like it added 3 x daily as his meal intake is not always consistent.

## 2018-12-21 NOTE — PROGRESS NOTE ADULT - SUBJECTIVE AND OBJECTIVE BOX
Patient is a 85y old  Male who presents with a chief complaint of Pericardial effusion w/ tamponade (21 Dec 2018 21:35)       Pt is seen and examined  pt is awake and lying in bed  pt seems comfortable     MEDICATIONS  (STANDING):  ferrous    sulfate 325 milliGRAM(s) Oral two times a day  influenza   Vaccine 0.5 milliLiter(s) IntraMuscular once  levothyroxine 75 MICROGram(s) Oral daily  sodium bicarbonate 650 milliGRAM(s) Oral four times a day  tamsulosin 0.4 milliGRAM(s) Oral at bedtime    MEDICATIONS  (PRN):      Allergies    No Known Allergies    Intolerances        Vital Signs Last 24 Hrs  T(C): 37.1 (21 Dec 2018 16:00), Max: 37.1 (21 Dec 2018 04:00)  T(F): 98.8 (21 Dec 2018 16:00), Max: 98.8 (21 Dec 2018 04:00)  HR: 71 (21 Dec 2018 22:00) (58 - 106)  BP: 116/50 (21 Dec 2018 22:00) (89/53 - 138/54)  BP(mean): 69 (21 Dec 2018 22:00) (59 - 97)  RR: 15 (21 Dec 2018 22:00) (13 - 25)  SpO2: 97% (21 Dec 2018 22:00) (95% - 100%)        LABS:                          7.4    9.0   )-----------( 205      ( 21 Dec 2018 15:18 )             21.7         Mean Cell Volume : 91.7 fl  Mean Cell Hemoglobin : 31.5 pg  Mean Cell Hemoglobin Concentration : 34.3 gm/dL  Auto Neutrophil # : 7.4 K/uL  Auto Lymphocyte # : 0.7 K/uL  Auto Monocyte # : 0.9 K/uL  Auto Eosinophil # : 0.1 K/uL  Auto Basophil # : 0.0 K/uL  Auto Neutrophil % : 81.7 %  Auto Lymphocyte % : 7.6 %  Auto Monocyte % : 9.6 %  Auto Eosinophil % : 0.8 %  Auto Basophil % : 0.3 %    Serial CBC's  12-21 @ 15:18  Hct-21.7 / Hgb-7.4 / Plat-205 / RBC-2.36 / WBC-9.0          Serial CBC's  12-21 @ 09:10  Hct-23.5 / Hgb-7.6 / Plat-212 / RBC-2.59 / WBC-8.6          Serial CBC's  12-21 @ 04:21  Hct-21.5 / Hgb-7.7 / Plat-203 / RBC-2.39 / WBC-8.4          Serial CBC's  12-20 @ 08:14  Hct-25.9 / Hgb-8.3 / Plat-198 / RBC-2.81 / WBC-8.19          Serial CBC's  12-19 @ 15:21  Hct-24.0 / Hgb-8.5 / Plat-224 / RBC-2.63 / WBC-8.1            12-21    136  |  102  |  94<H>  ----------------------------<  258<H>  5.0   |  18<L>  |  6.99<H>    Ca    8.7      21 Dec 2018 15:20  Phos  2.8     12-21  Mg     2.2     12-21    TPro  5.5<L>  /  Alb  3.1<L>  /  TBili  0.2  /  DBili  x   /  AST  10  /  ALT  6<L>  /  AlkPhos  81  12-21          Iron - Total Binding Capacity.: 157 ug/dL (12-21-18 @ 17:25)  WBC Count: 9.0 K/uL (12-21-18 @ 15:18)  Hemoglobin: 7.4 g/dL (12-21-18 @ 15:18)  Hematocrit: 21.7 % (12-21-18 @ 15:18)  Platelet Count - Automated: 205 K/uL (12-21-18 @ 15:18)  WBC Count: 8.6 K/uL (12-21-18 @ 09:10)  Hemoglobin: 7.6 g/dL (12-21-18 @ 09:10)  Hematocrit: 23.5 % (12-21-18 @ 09:10)  Platelet Count - Automated: 212 K/uL (12-21-18 @ 09:10)  WBC Count: 8.4 K/uL (12-21-18 @ 04:21)  Hemoglobin: 7.7 g/dL (12-21-18 @ 04:21)  Hematocrit: 21.5 % (12-21-18 @ 04:21)  Platelet Count - Automated: 203 K/uL (12-21-18 @ 04:21)  WBC Count: 8.19 K/uL (12-20-18 @ 08:14)  Hemoglobin: 8.3 g/dL (12-20-18 @ 08:14)  Hematocrit: 25.9 % (12-20-18 @ 08:14)  Platelet Count - Automated: 198 K/uL (12-20-18 @ 08:14)  Hemoglobin: 8.5 g/dL (12-19-18 @ 15:21)  Platelet Count - Automated: 224 K/uL (12-19-18 @ 15:21)  Hematocrit: 24.0 % (12-19-18 @ 15:21)  WBC Count: 8.1 K/uL (12-19-18 @ 15:21)      Serum Protein Electrophoresis Interp: Normal Electrophoresis Pattern (12-20 @ 13:26)  Immunofixation, Serum:   No Monoclonal Band Identified (12-20 @ 13:26)                  RADIOLOGY & ADDITIONAL STUDIES:

## 2018-12-21 NOTE — PROGRESS NOTE ADULT - SUBJECTIVE AND OBJECTIVE BOX
Admission date: Dec 19th  CHIEF COMPLAINT:  HPI:  NIGHT HOSPITALIST:   Patient UNKNOWN to me previously, assigned to me at this point via the ER and by Dr. Pulliam to admit this 86 y/o M--patient seen with patient's adult son in attendance--patient interviewed by examiner in patient's native Portuguese--son is bilingual and patient/son declined telephone --patient apparently with a history of gastric CA with patient consistently declining operative and standard chemotherapy options, with patient apparently on a mistletoe treatment (?) as a nonstandard approach with a group in [place] (?) following an evaluation in [place]--patient with progressive B/L LE oedema with progressive azotemia and with suprapubic fullness.   Patient with reported history of unspecified prostate disorder and follows Dr. Umaña above.  Apparently patient is now reconsidering treatment by Dr. Pacheco above but referred to the ER due to the progression of Cr.  Patient denies abdominal pain, no red blood per rectum or melena.  No chest pain/pressure.  No dyspnoea.  No back pain, no tearing back pain.   No fever, no chills, no rigors.   No HA, no focal weakness.   Patient with anorexia and unspecified involuntary weight loss.   No rash, no joint pain.   No dysuria, no haematuria.   Remaining review of systems not contributory. (19 Dec 2018 19:07)    INTERVAL HISTORY: s/p pericardial drain placed with initial 300ml removed; now with 50ml drained overnight    REVIEW OF SYSTEMS: Denies ; all others negative    MEDICATIONS  (STANDING):  influenza   Vaccine 0.5 milliLiter(s) IntraMuscular once  levothyroxine 75 MICROGram(s) Oral daily  sodium bicarbonate 650 milliGRAM(s) Oral four times a day    MEDICATIONS  (PRN):      Objective:  ICU Vital Signs Last 24 Hrs  T(C): 37.1 (21 Dec 2018 07:01), Max: 37.6 (20 Dec 2018 23:00)  T(F): 98.8 (21 Dec 2018 07:01), Max: 99.7 (20 Dec 2018 23:00)  HR: 79 (21 Dec 2018 07:01) (58 - 79)  BP: 113/51 (21 Dec 2018 07:01) (113/51 - 183/65)  BP(mean): 69 (21 Dec 2018 07:01) (68 - 88)  ABP: --  ABP(mean): --  RR: 16 (21 Dec 2018 07:01) (15 - 25)  SpO2: 97% (21 Dec 2018 07:01) (96% - 100%)           @ 07:01  -   @ 07:00  --------------------------------------------------------  IN: 1450 mL / OUT: 3130 mL / NET: -1680 mL     @ 07:01  -   @ 07:50  --------------------------------------------------------  IN: 0 mL / OUT: 75 mL / NET: -75 mL      Daily     Daily Weight in k.2 (21 Dec 2018 04:00)    PHYSICAL EXAM:  Constitutional:  HEENT:  Respiratory:  Cardiovascular:  Pericardial drain site:  Gastrointestinal:  Genitourinary:  Extremities:  Vascular:  Neurological:  Skin:      TELEMETRY:     EKG: NSR; rate 66; NATHANAEL 188; QTc 375    Labs:                          7.7    8.4   )-----------( 203      ( 21 Dec 2018 04:21 )             21.5         141  |  108  |  99<H>  ----------------------------<  121<H>  4.9   |  19<L>  |  7.04<H>    Ca    9.1      21 Dec 2018 04:21  Phos  3.2       Mg     1.7         TPro  6.6  /  Alb  3.8  /  TBili  0.2  /  DBili  x   /  AST  10  /  ALT  7<L>  /  AlkPhos  103      LIVER FUNCTIONS - ( 19 Dec 2018 15:21 )  Alb: 3.8 g/dL / Pro: 6.6 g/dL / ALK PHOS: 103 U/L / ALT: 7 U/L / AST: 10 U/L / GGT: x           PT/INR - ( 19 Dec 2018 20:36 )   PT: 11.7 sec;   INR: 1.03 ratio         PTT - ( 19 Dec 2018 20:36 )  PTT:30.0 sec    Urinalysis Basic - ( 19 Dec 2018 15:45 )    Color: Colorless / Appearance: Clear / S.012 / pH: x  Gluc: x / Ketone: Negative  / Bili: Negative / Urobili: Negative   Blood: x / Protein: 30 mg/dL / Nitrite: Negative   Leuk Esterase: Negative / RBC: 5 /hpf / WBC 1 /hpf   Sq Epi: x / Non Sq Epi: 0 /hpf / Bacteria: Negative        HEALTH ISSUES - PROBLEM Dx:  Malignant neoplasm of stomach, unspecified location: Malignant neoplasm of stomach, unspecified location  Hypertension, unspecified type: Hypertension, unspecified type  Pericardial effusion: Pericardial effusion  Hypothyroidism, unspecified type: Hypothyroidism, unspecified type  Bladder wall thickening: Bladder wall thickening  Stage 5 chronic kidney disease not on chronic dialysis: Stage 5 chronic kidney disease not on chronic dialysis Admission date: Dec 19th  CHIEF COMPLAINT:  HPI:  NIGHT HOSPITALIST:   Patient UNKNOWN to me previously, assigned to me at this point via the ER and by Dr. Pulliam to admit this 86 y/o M--patient seen with patient's adult son in attendance--patient interviewed by examiner in patient's native Romansh--son is bilingual and patient/son declined telephone --patient apparently with a history of gastric CA with patient consistently declining operative and standard chemotherapy options, with patient apparently on a mistletoe treatment (?) as a nonstandard approach with a group in [place] (?) following an evaluation in [place]--patient with progressive B/L LE oedema with progressive azotemia and with suprapubic fullness.   Patient with reported history of unspecified prostate disorder and follows Dr. Umaña above.  Apparently patient is now reconsidering treatment by Dr. Pacheco above but referred to the ER due to the progression of Cr.  Patient denies abdominal pain, no red blood per rectum or melena.  No chest pain/pressure.  No dyspnoea.  No back pain, no tearing back pain.   No fever, no chills, no rigors.   No HA, no focal weakness.   Patient with anorexia and unspecified involuntary weight loss.   No rash, no joint pain.   No dysuria, no haematuria.   Remaining review of systems not contributory. (19 Dec 2018 19:07)    INTERVAL HISTORY: s/p pericardial drain placed with initial 300ml removed; now with 50ml drained overnight  Resting comfortably in bed son at bedside, no complaints offered    REVIEW OF SYSTEMS: Denies chest pain, SOB, dizziness, abd pain, NV; all others negative    MEDICATIONS  (STANDING):  influenza   Vaccine 0.5 milliLiter(s) IntraMuscular once  levothyroxine 75 MICROGram(s) Oral daily  sodium bicarbonate 650 milliGRAM(s) Oral four times a day    MEDICATIONS  (PRN):      Objective:  ICU Vital Signs Last 24 Hrs  T(C): 37.1 (21 Dec 2018 07:01), Max: 37.6 (20 Dec 2018 23:00)  T(F): 98.8 (21 Dec 2018 07:01), Max: 99.7 (20 Dec 2018 23:00)  HR: 79 (21 Dec 2018 07:01) (58 - 79)  BP: 113/51 (21 Dec 2018 07:01) (113/51 - 183/65)  BP(mean): 69 (21 Dec 2018 07:01) (68 - 88)  ABP: --  ABP(mean): --  RR: 16 (21 Dec 2018 07:01) (15 - 25)  SpO2: 97% (21 Dec 2018 07:01) (96% - 100%)       @ 07:01  -   @ 07:00  --------------------------------------------------------  IN: 1450 mL / OUT: 3130 mL / NET: -1680 mL     @ 07:  -   @ 07:50  --------------------------------------------------------  IN: 0 mL / OUT: 75 mL / NET: -75 mL      Daily     Daily Weight in k.2 (21 Dec 2018 04:00)    PHYSICAL EXAM:  Constitutional: NAD  HEENT: oral mucosa pink moist  Respiratory: Regular unlabored CTA  Cardiovascular: RRR S1 S2 no M/R/G  Pericardial drain site: lower chest mid thoracic drain in place site ok no erythema; draining yellow fluid  Gastrointestinal: soft ND/NT; + bowel sounds  Genitourinary: urine cath in place draining blood tinged urine  Extremities: MUÑOZ equal strength; sequential compression stockings on  Vascular: warm peripherally   Neurological: A & O x 3 mood & affect appropriate  Skin: warm dry intact no rash/cyanosis      TELEMETRY: sinus rhythm HR 80s one brief episode tachycardia to 140    EKG: NSR; rate 66; NATHANAEL 188; QTc 375    Labs:                          7.7    8.4   )-----------( 203      ( 21 Dec 2018 04:21 )             21.5     12-    141  |  108  |  99<H>  ----------------------------<  121<H>  4.9   |  19<L>  |  7.04<H>    Ca    9.1      21 Dec 2018 04:21  Phos  3.2     12-  Mg     1.7         TPro  6.6  /  Alb  3.8  /  TBili  0.2  /  DBili  x   /  AST  10  /  ALT  7<L>  /  AlkPhos  103      LIVER FUNCTIONS - ( 19 Dec 2018 15:21 )  Alb: 3.8 g/dL / Pro: 6.6 g/dL / ALK PHOS: 103 U/L / ALT: 7 U/L / AST: 10 U/L / GGT: x           PT/INR - ( 19 Dec 2018 20:36 )   PT: 11.7 sec;   INR: 1.03 ratio         PTT - ( 19 Dec 2018 20:36 )  PTT:30.0 sec    Urinalysis Basic - ( 19 Dec 2018 15:45 )    Color: Colorless / Appearance: Clear / S.012 / pH: x  Gluc: x / Ketone: Negative  / Bili: Negative / Urobili: Negative   Blood: x / Protein: 30 mg/dL / Nitrite: Negative   Leuk Esterase: Negative / RBC: 5 /hpf / WBC 1 /hpf   Sq Epi: x / Non Sq Epi: 0 /hpf / Bacteria: Negative        HEALTH ISSUES - PROBLEM Dx:  Malignant neoplasm of stomach, unspecified location: Malignant neoplasm of stomach, unspecified location  Hypertension, unspecified type: Hypertension, unspecified type  Pericardial effusion: Pericardial effusion  Hypothyroidism, unspecified type: Hypothyroidism, unspecified type  Bladder wall thickening: Bladder wall thickening  Stage 5 chronic kidney disease not on chronic dialysis: Stage 5 chronic kidney disease not on chronic dialysis

## 2018-12-21 NOTE — PROGRESS NOTE ADULT - SUBJECTIVE AND OBJECTIVE BOX
[facility]   [phone]  RENAL FOLLOW UP NOTE  --------------------------------------------------------------------------------  Chief Complaint:    24 hour events/subjective:    states feels better. no specific c/o are offered     PAST HISTORY  --------------------------------------------------------------------------------  No significant changes to PMH, PSH, FHx, SHx, unless otherwise noted    ALLERGIES & MEDICATIONS  --------------------------------------------------------------------------------  Allergies    No Known Allergies    Intolerances      Standing Inpatient Medications  influenza   Vaccine 0.5 milliLiter(s) IntraMuscular once  levothyroxine 75 MICROGram(s) Oral daily  sodium bicarbonate 650 milliGRAM(s) Oral four times a day    PRN Inpatient Medications      REVIEW OF SYSTEMS  --------------------------------------------------------------------------------    Gen: denies fatigue, fevers/chills,  CVS: denies chest pain/palpitations  Resp: denies SOB/Cough  GI: Denies N/V/Abd pain  : Denies dysuria    All other systems were reviewed and are negative, except as noted.    VITALS/PHYSICAL EXAM  --------------------------------------------------------------------------------  T(C): 37.1 (12-21-18 @ 07:01), Max: 37.6 (12-20-18 @ 23:00)  HR: 87 (12-21-18 @ 10:00) (58 - 87)  BP: 104/89 (12-21-18 @ 10:00) (104/89 - 183/65)  RR: 14 (12-21-18 @ 10:00) (14 - 25)  SpO2: 99% (12-21-18 @ 10:00) (96% - 100%)  Wt(kg): --  Height (cm): 165.1 (12-19-18 @ 13:50)  Weight (kg): 65.8 (12-19-18 @ 13:50)  BMI (kg/m2): 24.1 (12-19-18 @ 13:50)  BSA (m2): 1.73 (12-19-18 @ 13:50)      12-20-18 @ 07:01  -  12-21-18 @ 07:00  --------------------------------------------------------  IN: 1450 mL / OUT: 3130 mL / NET: -1680 mL    12-21-18 @ 07:01  -  12-21-18 @ 10:40  --------------------------------------------------------  IN: 120 mL / OUT: 125 mL / NET: -5 mL      Physical Exam:  	  	Gen: alert oriented place pale appearing. speaks limited english   	Pulm: Decreased breath sounds b/l bases. no rales or ronchi or wheezing  	CV: RRR, S1/S2. no rub  	Back: No CVA tenderness  	Abd: +BS, soft, nontender/nondistended  	: No suprapubic tenderness.               Extremity: No cyanosis, no edema no clubbing  	      LABS/STUDIES  --------------------------------------------------------------------------------              7.6    8.6   >-----------<  212      [12-21-18 @ 09:10]              23.5     141  |  106  |  97  ----------------------------<  114      [12-21-18 @ 09:10]  4.7   |  21  |  7.09        Ca     9.0     [12-21-18 @ 09:10]      Mg     1.7     [12-21-18 @ 04:21]      Phos  3.2     [12-21-18 @ 04:21]    TPro  6.6  /  Alb  3.8  /  TBili  0.2  /  DBili  x   /  AST  10  /  ALT  7   /  AlkPhos  103  [12-19-18 @ 15:21]    PT/INR: PT 11.7 , INR 1.03       [12-19-18 @ 20:36]  PTT: 30.0       [12-19-18 @ 20:36]      Creatinine Trend:  SCr 7.09 [12-21 @ 09:10]  SCr 7.04 [12-21 @ 04:21]  SCr 7.92 [12-20 @ 06:37]  SCr 7.62 [12-19 @ 15:21]      	  Urinalysis - [12-19-18 @ 15:45]      Color Colorless / Appearance Clear / SG 1.012 / pH 6.5      Gluc Negative / Ketone Negative  / Bili Negative / Urobili Negative       Blood Negative / Protein 30 mg/dL / Leuk Est Negative / Nitrite Negative      RBC 5 / WBC 1 / Hyaline 1 / Gran  / Sq Epi  / Non Sq Epi 0 / Bacteria Negative    Urine Creatinine 47      [12-21-18 @ 08:36]  Urine Protein 36      [12-21-18 @ 08:36]    TSH 8.92      [12-20-18 @ 08:18]    C3 Complement 99      [12-20-18 @ 13:26]  C4 Complement 32      [12-20-18 @ 13:26]

## 2018-12-21 NOTE — DIETITIAN INITIAL EVALUATION ADULT. - PHYSICAL APPEARANCE
underweight/Pt appears grossly malnourished, with significant orbital fat pad loss, protruding clavicle, tricep fat depletion. Nutrition-focused physical exam conducted; reveals severe orbital fat pad loss, severe tricep fat depletion, severe temporal muscle wasting./underweight

## 2018-12-21 NOTE — PROGRESS NOTE ADULT - PROBLEM SELECTOR PLAN 3
Renal following  Creat 7.04 from 7.92 admission yesterday  Monitor I & O   Negative 1.6 liter   Avoid nephrotoxic meds

## 2018-12-21 NOTE — PROGRESS NOTE ADULT - ASSESSMENT
86 y/o M- with a history of gastric CA with patient consistently, declining operative and standard chemotherapy options with progressive B/L LE edema with progressive azotemia and with suprapubic fullness. Now pericardial drain for tamponade physiology

## 2018-12-21 NOTE — DIETITIAN INITIAL EVALUATION ADULT. - SOURCE
patient/other (specify)/electronic medical records patient/other (specify)/electronic medical records, pt's 2 sons at bedside

## 2018-12-21 NOTE — CONSULT NOTE ADULT - SUBJECTIVE AND OBJECTIVE BOX
Patient is a 85y old  Male who presents with a chief complaint of Worsening creatinine with suprapubic fullness (20 Dec 2018 14:47)       Pt is seen and examined  pt is awake and lying in bed  pt seems comfortable    MEDICATIONS  (STANDING):  influenza   Vaccine 0.5 milliLiter(s) IntraMuscular once  levothyroxine 75 MICROGram(s) Oral daily  sodium bicarbonate 650 milliGRAM(s) Oral four times a day    MEDICATIONS  (PRN):      Allergies    No Known Allergies          Vital Signs Last 24 Hrs  T(C): 37.6 (20 Dec 2018 23:00), Max: 37.6 (20 Dec 2018 23:00)  T(F): 99.7 (20 Dec 2018 23:00), Max: 99.7 (20 Dec 2018 23:00)  HR: 63 (21 Dec 2018 01:00) (58 - 75)  BP: 114/52 (21 Dec 2018 01:00) (114/52 - 183/65)  BP(mean): 70 (21 Dec 2018 01:00) (70 - 88)  RR: 25 (21 Dec 2018 01:00) (15 - 25)  SpO2: 97% (21 Dec 2018 01:00) (96% - 100%)        LABS:                          8.3    8.19  )-----------( 198      ( 20 Dec 2018 08:14 )             25.9         Mean Cell Volume : 92.2 fl  Mean Cell Hemoglobin : 29.5 pg  Mean Cell Hemoglobin Concentration : 32.0 gm/dL  Auto Neutrophil # : 6.49 K/uL  Auto Lymphocyte # : 0.93 K/uL  Auto Monocyte # : 0.63 K/uL  Auto Eosinophil # : 0.10 K/uL  Auto Basophil # : 0.02 K/uL  Auto Neutrophil % : 79.3 %  Auto Lymphocyte % : 11.4 %  Auto Monocyte % : 7.7 %  Auto Eosinophil % : 1.2 %  Auto Basophil % : 0.2 %    Serial CBC's  12-20 @ 08:14  Hct-25.9 / Hgb-8.3 / Plat-198 / RBC-2.81 / WBC-8.19          Serial CBC's  12-19 @ 15:21  Hct-24.0 / Hgb-8.5 / Plat-224 / RBC-2.63 / WBC-8.1            12-20    145  |  111<H>  |  103<H>  ----------------------------<  98  5.2   |  19<L>  |  7.92<H>    Ca    9.6      20 Dec 2018 06:37    TPro  6.6  /  Alb  3.8  /  TBili  0.2  /  DBili  x   /  AST  10  /  ALT  7<L>  /  AlkPhos  103  12-19      PT/INR - ( 19 Dec 2018 20:36 )   PT: 11.7 sec;   INR: 1.03 ratio         PTT - ( 19 Dec 2018 20:36 )  PTT:30.0 sec    WBC Count: 8.19 K/uL (12-20-18 @ 08:14)  Hemoglobin: 8.3 g/dL (12-20-18 @ 08:14)  Hematocrit: 25.9 % (12-20-18 @ 08:14)  Platelet Count - Automated: 198 K/uL (12-20-18 @ 08:14)  Hemoglobin: 8.5 g/dL (12-19-18 @ 15:21)  Platelet Count - Automated: 224 K/uL (12-19-18 @ 15:21)  Hematocrit: 24.0 % (12-19-18 @ 15:21)  WBC Count: 8.1 K/uL (12-19-18 @ 15:21)                      RADIOLOGY & ADDITIONAL STUDIES:< from: CT Abdomen and Pelvis No Cont (12.19.18 @ 16:49) >    EXAM:  CT ABDOMEN AND PELVIS                          EXAM:  CT CHEST                            PROCEDURE DATE:  12/19/2018            INTERPRETATION:  CLINICAL INFORMATION: Abdominal pain. Gastric cancer.   Elevated creatinine.  Evaluate for metastatic disease.    COMPARISON: CT abdomen pelvis 12/23/2016. CT chest 8/6/2017.    PROCEDURE:   CT of the Chest, Abdomen and Pelvis was performed without intravenous   contrast.   Intravenous contrast: None.  Oral contrast: None.  Sagittal and coronalreformats were performed.    FINDINGS:    CHEST:     LUNGS AND LARGE AIRWAYS: Patent central airways.  Calcified granuloma in   the left upper lobe. Bibasilar and lingular atelectasis.  PLEURA: Small left pleural effusion.  VESSELS: Coronary artery and aortic calcifications.  HEART: Heart size is normal. Large pericardial effusion.  MEDIASTINUM AND DANIELLE: Calcified mediastinal lymph nodes, unchanged.  CHEST WALL AND LOWER NECK: Within normal limits.    ABDOMEN AND PELVIS:    Evaluation of the solid viscera is limited without intravenous contrast.    LIVER: Within normal limits.  BILE DUCTS: Normal caliber.  GALLBLADDER: Cholelithiasis.  SPLEEN: Within normal limits.  PANCREAS: Within normal limits.  ADRENALS: Within normal limits.  KIDNEYS/URETERS: There is bilateral hydroureteronephrosis, with   dilatation of the ureters to the level of the urinary bladder, moderate   on the right and severe on the left.     BLADDER: The urinary bladder is collapsed around a Byrnes catheter. There   is concentric urinary bladder wall thickening and perivesicular   stranding. Several calculi within the urinary bladder.  REPRODUCTIVE ORGANS: The prostate is enlarged.    BOWEL: Thickening of the gastric wall in the region of the fundus likely   corresponds with patient's known gastric cancer. No bowel obstruction.   Appendix is normal.  PERITONEUM: Trace pelvic free fluid.  VESSELS:  Atherosclerotic calcification.  RETROPERITONEUM: Gastrohepatic ligament adenopathy, measuring 1.7 x 1.5   cm (2:73).     ABDOMINAL WALL: Within normal limits.  BONES: Multilevel degenerative changes.    IMPRESSION:     Large pericardial effusion and small left pleural effusion.    Bilateral hydroureteronephrosis, severe on the left and moderate on the   right, to the level of the urinary bladder. Small bladder calculi are   noted in the region of the ureterovesical junction on the left. Marked   urinary bladder wall thickening and perivesicular stranding may be   related to chronic outlet obstruction from an enlarged prostate.   Correlate with urinalysis to evaluate for cystitis. Underlying bladder   neoplasm cannot be excluded.    Thickening of the gastric wall in the region of the fundus likely   corresponds with patient's known gastric cancer. In addition, there is   gastro- hepatic ligament adenopathy. Correlation with prior imaging is   recommended.                      ALLISON GREEN M.D., RADIOLOGY FELLOW  This document has been electronically signed.  LILIAN EDWARDS M.D., ATTENDING RADIOLOGIST  This document has been electronically signed. Dec 19 2018  5:15PM                < end of copied text >

## 2018-12-21 NOTE — PROGRESS NOTE ADULT - SUBJECTIVE AND OBJECTIVE BOX
Patient is a 85y old  Male who presents with a chief complaint of Worsening creatinine with suprapubic fullness (21 Dec 2018 07:49)      INTERVAL HPI/OVERNIGHT EVENTS:    MEDICATIONS  (STANDING):  influenza   Vaccine 0.5 milliLiter(s) IntraMuscular once  levothyroxine 75 MICROGram(s) Oral daily  sodium bicarbonate 650 milliGRAM(s) Oral four times a day    MEDICATIONS  (PRN):      Allergies    No Known Allergies    Intolerances        Vital Signs Last 24 Hrs  T(C): 37.1 (21 Dec 2018 07:01), Max: 37.6 (20 Dec 2018 23:00)  T(F): 98.8 (21 Dec 2018 07:01), Max: 99.7 (20 Dec 2018 23:00)  HR: 83 (21 Dec 2018 08:00) (58 - 83)  BP: 130/55 (21 Dec 2018 08:00) (113/51 - 183/65)  BP(mean): 76 (21 Dec 2018 08:00) (68 - 88)  RR: 16 (21 Dec 2018 08:00) (15 - 25)  SpO2: 98% (21 Dec 2018 08:00) (96% - 100%)    LABS:                        7.7    8.4   )-----------( 203      ( 21 Dec 2018 04:21 )             21.5     12-21    141  |  108  |  99<H>  ----------------------------<  121<H>  4.9   |  19<L>  |  7.04<H>    Ca    9.1      21 Dec 2018 04:21  Phos  3.2     12-21  Mg     1.7     12-    TPro  6.6  /  Alb  3.8  /  TBili  0.2  /  DBili  x   /  AST  10  /  ALT  7<L>  /  AlkPhos  103  12-19    PT/INR - ( 19 Dec 2018 20:36 )   PT: 11.7 sec;   INR: 1.03 ratio         PTT - ( 19 Dec 2018 20:36 )  PTT:30.0 sec  Urinalysis Basic - ( 19 Dec 2018 15:45 )    Color: Colorless / Appearance: Clear / S.012 / pH: x  Gluc: x / Ketone: Negative  / Bili: Negative / Urobili: Negative   Blood: x / Protein: 30 mg/dL / Nitrite: Negative   Leuk Esterase: Negative / RBC: 5 /hpf / WBC 1 /hpf   Sq Epi: x / Non Sq Epi: 0 /hpf / Bacteria: Negative        RADIOLOGY & ADDITIONAL TESTS:        Dr Lewis [phone] Patient is a 85y old  Male who presents with a chief complaint of Worsening creatinine with suprapubic fullness (21 Dec 2018 07:49)      INTERVAL HPI/OVERNIGHT EVENTS:  on tele-case  dw   Dr Thompson  MEDICATIONS  (STANDING):  influenza   Vaccine 0.5 milliLiter(s) IntraMuscular once  levothyroxine 75 MICROGram(s) Oral daily  sodium bicarbonate 650 milliGRAM(s) Oral four times a day    MEDICATIONS  (PRN):      Allergies    No Known Allergies    Intolerances        Vital Signs Last 24 Hrs  T(C): 37.1 (21 Dec 2018 07:01), Max: 37.6 (20 Dec 2018 23:00)  T(F): 98.8 (21 Dec 2018 07:01), Max: 99.7 (20 Dec 2018 23:00)  HR: 83 (21 Dec 2018 08:00) (58 - 83)  BP: 130/55 (21 Dec 2018 08:00) (113/51 - 183/65)  BP(mean): 76 (21 Dec 2018 08:00) (68 - 88)  RR: 16 (21 Dec 2018 08:00) (15 - 25)  SpO2: 98% (21 Dec 2018 08:00) (96% - 100%)    LABS:                        7.7    8.4   )-----------( 203      ( 21 Dec 2018 04:21 )             21.5     12-21    141  |  108  |  99<H>  ----------------------------<  121<H>  4.9   |  19<L>  |  7.04<H>    Ca    9.1      21 Dec 2018 04:21  Phos  3.2     12-  Mg     1.7     12-    TPro  6.6  /  Alb  3.8  /  TBili  0.2  /  DBili  x   /  AST  10  /  ALT  7<L>  /  AlkPhos  103  12-19    PT/INR - ( 19 Dec 2018 20:36 )   PT: 11.7 sec;   INR: 1.03 ratio         PTT - ( 19 Dec 2018 20:36 )  PTT:30.0 sec  Urinalysis Basic - ( 19 Dec 2018 15:45 )    Color: Colorless / Appearance: Clear / S.012 / pH: x  Gluc: x / Ketone: Negative  / Bili: Negative / Urobili: Negative   Blood: x / Protein: 30 mg/dL / Nitrite: Negative   Leuk Esterase: Negative / RBC: 5 /hpf / WBC 1 /hpf   Sq Epi: x / Non Sq Epi: 0 /hpf / Bacteria: Negative        RADIOLOGY & ADDITIONAL TESTS:        Dr Lewis [phone]

## 2018-12-21 NOTE — PROGRESS NOTE ADULT - ATTENDING COMMENTS
Patient is seen and examined with fellow, NP and the CCU house-staff. I agree with the history, physical and the assessment and plan.  s/p pericardial tap - 50 cc overnight  TTE today   monitor H/H  worsening renal function - patient is not a candidate for HD in light of his possible untreated metastatic disease

## 2018-12-21 NOTE — DIETITIAN INITIAL EVALUATION ADULT. - ORAL INTAKE PTA
fair/Pt reports fair po intake PTA. Typical meal intake includes: Apple and orange juice mix with cold cereal. Lunch: Soup made with bouillon cube, fish. Dinner: Soup and fish, occasionally brown rice. Pt denies taking vitamin/mineral/herbal supplements PTA. NKFA.

## 2018-12-21 NOTE — PROGRESS NOTE ADULT - ASSESSMENT
84 y/o M-patient apparently with a history of gastric CA  non chemo treated with STELLA obstructive uropathy with hyperkalemia on recent blood work with pericardial tamponade on echo       1- STELLA   2- pericardial tamponade/effusion  3- obstructive uropathy    s/p pericardiocentesis with high RBC  cont ayala  cr slightly better. has good uo.   monitor renal function for improvement  prbc if hb lower;  and procrit 62508 units tiw   pt is not candidate for dialysis due to his gastric cancer

## 2018-12-21 NOTE — DIETITIAN INITIAL EVALUATION ADULT. - NS AS NUTRI INTERV ED CONTENT3
Nutrition relationship to health/disease/Recommended modifications/Purpose of the nutrition education/Educated pt on renal diet recommendations. Discussed the following: limiting dietary potassium, sodium, phosphorous and fluids, reviewed food sources of these nutrients, discussed suitable alternatives, discussed consuming lower fat and lower fiber foods for better tolerance as they may be easier on the stomach, limting other gastric irritants (very spicy or acidic foods) which may be helpful in alleviating gastric symptoms. Pt voiced understanding and accepted Phosphorous, Potassium, Sodium, and Fluid recommendation handouts in setting of ESRD (both Ecuadorean and English version requested and provided).

## 2018-12-21 NOTE — DIETITIAN INITIAL EVALUATION ADULT. - NS AS NUTRI INTERV MEALS SNACK
Recommend change diet to soft, renal in setting of ESRD. Pt's food preferences obtained and honored on menu within therapeutic diet ordered. Encouraed intake of high biological value protein foods as tolerated./General/healthful diet/Mineral - modified diet

## 2018-12-21 NOTE — DIETITIAN INITIAL EVALUATION ADULT. - ADHERENCE
Pt reports avoiding banana 2/2 potassium content PTA; however per diet recall suspect pt still consuming concentrated phosphorous foods, higher sodium and potentially excess fluid/fair Pt reports avoiding banana 2/2 potassium content PTA; however per diet recall suspect pt still consuming concentrated phosphorous and potassium foods, higher sodium and potentially excess fluid/fair

## 2018-12-21 NOTE — PROGRESS NOTE ADULT - ASSESSMENT
gastric cancer-renal failure-bilateral hydroureter-pericardial  effusion /tamponade s/p drain gastric cancer-renal failure-bilateral hydroureter-pericardial  effusion /tamponade s/p drain  followup echo

## 2018-12-21 NOTE — DIETITIAN INITIAL EVALUATION ADULT. - ENERGY NEEDS
ht: 65 inches. wt: 130.5 pounds (current, bedscale, +3 BLE edema noted). BMI: 21.7 kG/m2-however of note pt is edematous. UBW: IBW: 136 pounds +/- 10%. %IBW: 96%  Other pertinent objective information: 85 year old male pt with PMH reported gastric CA, no surgical or chemo treatment presents with progressive B/L LE edema with progressive azotemia and with suprapubic fullness. Noted with STELLA/CKD5 and pericardial tamponade revealed on echo now s/p drain. Nephrology following, noted that pt is not candidate for dialysis due to his gastric cancer. No pressure injuries noted. ht: 65 inches. wt: 130.5 pounds (current, bedscale, +3 BLE edema noted). BMI: 21.7 kG/m2-however of note pt is edematous. UBW: 160 pounds x 1 year ago per pt. IBW: 136 pounds +/- 10%. %IBW: 96%  Other pertinent objective information: 85 year old male pt with PMH reported gastric CA, no surgical or chemo treatment presents with progressive B/L LE edema with progressive azotemia and with suprapubic fullness. Noted with STELLA/CKD5 and pericardial tamponade revealed on echo now s/p drain. Nephrology following, noted that pt is not candidate for dialysis due to his gastric cancer. No pressure injuries noted.

## 2018-12-21 NOTE — PROGRESS NOTE ADULT - SUBJECTIVE AND OBJECTIVE BOX
====================  CCU MIDNIGHT ROUNDS  ====================    DANIELLA CASEY  [id number]    Patient is a 85y old  Male who presents with a chief complaint of Worsening creatinine with suprapubic fullness (21 Dec 2018 10:40)    ====================  SUMMARY:  ====================    ====================  NEW EVENTS: Pericardial drain (150ml output)  ====================    MEDICATIONS  (STANDING):  influenza   Vaccine 0.5 milliLiter(s) IntraMuscular once  levothyroxine 75 MICROGram(s) Oral daily  sodium bicarbonate 650 milliGRAM(s) Oral four times a day  tamsulosin 0.4 milliGRAM(s) Oral at bedtime    ====================  VITALS (Last 12 hrs):  ====================  T(C): 37.1 (12-21-18 @ 16:00), Max: 37.1 (12-21-18 @ 12:00)  T(F): 98.8 (12-21-18 @ 16:00), Max: 98.8 (12-21-18 @ 12:00)  HR: 78 (12-21-18 @ 20:00) (74 - 106)  BP: 128/49 (12-21-18 @ 20:00) (89/53 - 132/57)  BP(mean): 73 (12-21-18 @ 20:00) (59 - 97)  RR: 23 (12-21-18 @ 20:00) (14 - 23)  SpO2: 95% (12-21-18 @ 20:00) (95% - 100%)      I&O's Summary    20 Dec 2018 07:01  -  21 Dec 2018 07:00  --------------------------------------------------------  IN: 1450 mL / OUT: 3130 mL / NET: -1680 mL    21 Dec 2018 07:01  -  21 Dec 2018 21:35  --------------------------------------------------------  IN: 650 mL / OUT: 975 mL / NET: -325 mL      ====================  NEW LABS:  ====================  12-21    136  |  102  |  94<H>  ----------------------------<  258<H>  5.0   |  18<L>  |  6.99<H>    Ca    8.7      21 Dec 2018 15:20  Phos  2.8     12-21  Mg     2.2     12-21    TPro  5.5<L>  /  Alb  3.1<L>  /  TBili  0.2  /  DBili  x   /  AST  10  /  ALT  6<L>  /  AlkPhos  81  12-21    ====================  PLAN:  ====================  #Pericardial effusion w/ tamponade s/p drain (12/20)  - TTE today revealing small pericardial effusion, no tamponade, normal RVF  - Continue to monitor drain output   - F/U cytology     #Urinary retention s/p Byrnes (12/19)  - Start Flomax 0.4mg po daily   - Urology consult appreciated     #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, w/ underline obstructive uropathy   - Monitor renal function & urine output   - Renally dose meds, avoid nephrotoxins   - Not a HD candidate 2/2 gastric CA  - Continue HCO3 po qid   - F/U Nephro recs     #Hematuria; resolving (likely catheter induced trauma)  - Monitor urine; hematuria resolving   - Trend CBC    #Gastric CA; refusing treatment   - Consider Palliative consult   - GOC discussion   - F/U Onc recs    #Iron deficiency anemia; likely underline anemia of chronic disease   - Start ferrous sulfate 325mg po bid   - H/H stable; trend CBC daily   - Keep Hgb > 7, transfuse prn   - Hematuria resolving   - Fecal occult    #DVT ppx  - SCDs    Yvrose London CCU NP  Beeper #[id number]  Spectra # [phone] ====================  CCU MIDNIGHT ROUNDS  ====================    DANIELLA CASEY  [id number]    Patient is a 85y old  Male who presents with a chief complaint of Worsening creatinine with suprapubic fullness (21 Dec 2018 10:40)    ====================  SUMMARY: 85M PMH of gastric CA declining operative and standard chemotherapy options who p/w progressive B/L LE edema with progressive azotemia and suprapubic fullness. Now pericardial drain for tamponade physiology.  ====================    ====================  NEW EVENTS: Pericardial drain (150ml output)  ====================    MEDICATIONS  (STANDING):  influenza   Vaccine 0.5 milliLiter(s) IntraMuscular once  levothyroxine 75 MICROGram(s) Oral daily  sodium bicarbonate 650 milliGRAM(s) Oral four times a day  tamsulosin 0.4 milliGRAM(s) Oral at bedtime    ====================  VITALS (Last 12 hrs):  ====================  T(C): 37.1 (12-21-18 @ 16:00), Max: 37.1 (12-21-18 @ 12:00)  T(F): 98.8 (12-21-18 @ 16:00), Max: 98.8 (12-21-18 @ 12:00)  HR: 78 (12-21-18 @ 20:00) (74 - 106)  BP: 128/49 (12-21-18 @ 20:00) (89/53 - 132/57)  BP(mean): 73 (12-21-18 @ 20:00) (59 - 97)  RR: 23 (12-21-18 @ 20:00) (14 - 23)  SpO2: 95% (12-21-18 @ 20:00) (95% - 100%)      I&O's Summary    20 Dec 2018 07:01  -  21 Dec 2018 07:00  --------------------------------------------------------  IN: 1450 mL / OUT: 3130 mL / NET: -1680 mL    21 Dec 2018 07:01  -  21 Dec 2018 21:35  --------------------------------------------------------  IN: 650 mL / OUT: 975 mL / NET: -325 mL      ====================  NEW LABS:  ====================  12-21    136  |  102  |  94<H>  ----------------------------<  258<H>  5.0   |  18<L>  |  6.99<H>    Ca    8.7      21 Dec 2018 15:20  Phos  2.8     12-21  Mg     2.2     12-21    TPro  5.5<L>  /  Alb  3.1<L>  /  TBili  0.2  /  DBili  x   /  AST  10  /  ALT  6<L>  /  AlkPhos  81  12-21    ====================  PLAN:  ====================  #Pericardial effusion w/ tamponade s/p drain (12/20)  - TTE today revealing small pericardial effusion, no tamponade, normal RVF  - Continue to monitor drain output   - F/U cytology     #Urinary retention s/p Byrnes (12/19)  - Start Flomax 0.4mg po daily   - Urology consult appreciated     #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, w/ underline obstructive uropathy   - Monitor renal function & urine output   - Renally dose meds, avoid nephrotoxins   - Not a HD candidate 2/2 gastric CA  - Continue HCO3 po qid   - F/U Nephro recs     #Hematuria; resolving (likely catheter induced trauma)  - Monitor urine; hematuria resolving   - Trend CBC    #Gastric CA; refusing treatment   - Consider Palliative consult   - GOC discussion   - F/U Onc recs    #Iron deficiency anemia; likely underline anemia of chronic disease   - Start ferrous sulfate 325mg po bid   - H/H stable; trend CBC daily   - Keep Hgb > 7, transfuse prn   - Hematuria resolving   - Fecal occult    #DVT ppx  - SCDs    Yvrose London CCU NP  Beeper #[id number]  Spectra # [id number] ====================  CCU MIDNIGHT ROUNDS  ====================    DANIELLA CASEY  [id number]    Patient is a 85y old  Male who presents with a chief complaint of Worsening creatinine with suprapubic fullness (21 Dec 2018 10:40)    ====================  SUMMARY: 85M PMH of gastric CA declining operative and standard chemotherapy options who p/w progressive B/L LE edema with progressive azotemia and suprapubic fullness. Now pericardial drain for tamponade physiology.  ====================    ====================  NEW EVENTS: Pericardial drain (200ml output in 24hr)  ====================    MEDICATIONS  (STANDING):  influenza   Vaccine 0.5 milliLiter(s) IntraMuscular once  levothyroxine 75 MICROGram(s) Oral daily  sodium bicarbonate 650 milliGRAM(s) Oral four times a day  tamsulosin 0.4 milliGRAM(s) Oral at bedtime    ====================  VITALS (Last 12 hrs):  ====================  T(C): 37.1 (12-21-18 @ 16:00), Max: 37.1 (12-21-18 @ 12:00)  T(F): 98.8 (12-21-18 @ 16:00), Max: 98.8 (12-21-18 @ 12:00)  HR: 78 (12-21-18 @ 20:00) (74 - 106)  BP: 128/49 (12-21-18 @ 20:00) (89/53 - 132/57)  BP(mean): 73 (12-21-18 @ 20:00) (59 - 97)  RR: 23 (12-21-18 @ 20:00) (14 - 23)  SpO2: 95% (12-21-18 @ 20:00) (95% - 100%)      I&O's Summary    20 Dec 2018 07:01  -  21 Dec 2018 07:00  --------------------------------------------------------  IN: 1450 mL / OUT: 3130 mL / NET: -1680 mL    21 Dec 2018 07:01  -  21 Dec 2018 21:35  --------------------------------------------------------  IN: 650 mL / OUT: 975 mL / NET: -325 mL      ====================  NEW LABS:  ====================  12-21    136  |  102  |  94<H>  ----------------------------<  258<H>  5.0   |  18<L>  |  6.99<H>    Ca    8.7      21 Dec 2018 15:20  Phos  2.8     12-21  Mg     2.2     12-21    TPro  5.5<L>  /  Alb  3.1<L>  /  TBili  0.2  /  DBili  x   /  AST  10  /  ALT  6<L>  /  AlkPhos  81  12-21    ====================  PLAN:  ====================  #Pericardial effusion w/ tamponade s/p drain (12/20)  - TTE today revealing small pericardial effusion, no tamponade, normal RVF  - Continue to monitor drain output (200ml in 24hr)  - F/U cytology     #Urinary retention s/p Byrnes (12/19)  - Start Flomax 0.4mg po daily   - Urology consult appreciated     #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, w/ underline obstructive uropathy   - Monitor renal function & urine output   - Renally dose meds, avoid nephrotoxins   - Not a HD candidate 2/2 gastric CA  - Continue HCO3 po qid   - F/U Nephro recs     #Hematuria; resolving (likely catheter induced trauma)  - Monitor urine; hematuria resolving   - Trend CBC    #Gastric CA; refusing treatment   - Consider Palliative consult   - GOC discussion   - F/U Onc recs    #Iron deficiency anemia; likely underline anemia of chronic disease   - Start ferrous sulfate 325mg po bid   - H/H stable; trend CBC daily   - Keep Hgb > 7, transfuse prn   - Hematuria resolving   - Fecal occult    #DVT ppx  - SCDs    Yvrose London CCU NP  Beeper #[id number]  Spectra # [phone]

## 2018-12-21 NOTE — PROGRESS NOTE ADULT - PROBLEM SELECTOR PLAN 1
s/p pericardial drain  monitor output  TTE today f/u results s/p pericardial drain  monitor output  f/u results  TTE today f/u results

## 2018-12-22 LAB
ALBUMIN SERPL ELPH-MCNC: 3.1 G/DL — LOW (ref 3.3–5)
ALP SERPL-CCNC: 86 U/L — SIGNIFICANT CHANGE UP (ref 40–120)
ALT FLD-CCNC: 8 U/L — LOW (ref 10–45)
ANION GAP SERPL CALC-SCNC: 14 MMOL/L — SIGNIFICANT CHANGE UP (ref 5–17)
AST SERPL-CCNC: 9 U/L — LOW (ref 10–40)
BILIRUB SERPL-MCNC: 0.2 MG/DL — SIGNIFICANT CHANGE UP (ref 0.2–1.2)
BUN SERPL-MCNC: 92 MG/DL — HIGH (ref 7–23)
CALCIUM SERPL-MCNC: 9.3 MG/DL — SIGNIFICANT CHANGE UP (ref 8.4–10.5)
CHLORIDE SERPL-SCNC: 105 MMOL/L — SIGNIFICANT CHANGE UP (ref 96–108)
CO2 SERPL-SCNC: 20 MMOL/L — LOW (ref 22–31)
CREAT SERPL-MCNC: 6.7 MG/DL — HIGH (ref 0.5–1.3)
FERRITIN SERPL-MCNC: 103 NG/ML — SIGNIFICANT CHANGE UP (ref 30–400)
GLUCOSE SERPL-MCNC: 100 MG/DL — HIGH (ref 70–99)
HCT VFR BLD CALC: 25 % — LOW (ref 39–50)
HGB BLD-MCNC: 8.6 G/DL — LOW (ref 13–17)
MAGNESIUM SERPL-MCNC: 2.4 MG/DL — SIGNIFICANT CHANGE UP (ref 1.6–2.6)
MCHC RBC-ENTMCNC: 31.5 PG — SIGNIFICANT CHANGE UP (ref 27–34)
MCHC RBC-ENTMCNC: 34.4 GM/DL — SIGNIFICANT CHANGE UP (ref 32–36)
MCV RBC AUTO: 91.5 FL — SIGNIFICANT CHANGE UP (ref 80–100)
PHOSPHATE SERPL-MCNC: 3.2 MG/DL — SIGNIFICANT CHANGE UP (ref 2.5–4.5)
PLATELET # BLD AUTO: 244 K/UL — SIGNIFICANT CHANGE UP (ref 150–400)
POTASSIUM SERPL-MCNC: 4.5 MMOL/L — SIGNIFICANT CHANGE UP (ref 3.5–5.3)
POTASSIUM SERPL-SCNC: 4.5 MMOL/L — SIGNIFICANT CHANGE UP (ref 3.5–5.3)
PROT SERPL-MCNC: 6.1 G/DL — SIGNIFICANT CHANGE UP (ref 6–8.3)
RBC # BLD: 2.73 M/UL — LOW (ref 4.2–5.8)
RBC # FLD: 13.1 % — SIGNIFICANT CHANGE UP (ref 10.3–14.5)
SODIUM SERPL-SCNC: 139 MMOL/L — SIGNIFICANT CHANGE UP (ref 135–145)
T3 SERPL-MCNC: 54 NG/DL — LOW (ref 80–200)
T4 AB SER-ACNC: 4.3 UG/DL — LOW (ref 4.6–12)
WBC # BLD: 8.5 K/UL — SIGNIFICANT CHANGE UP (ref 3.8–10.5)
WBC # FLD AUTO: 8.5 K/UL — SIGNIFICANT CHANGE UP (ref 3.8–10.5)

## 2018-12-22 PROCEDURE — 93308 TTE F-UP OR LMTD: CPT | Mod: 26

## 2018-12-22 PROCEDURE — 93321 DOPPLER ECHO F-UP/LMTD STD: CPT | Mod: 26

## 2018-12-22 PROCEDURE — 93010 ELECTROCARDIOGRAM REPORT: CPT

## 2018-12-22 PROCEDURE — 99291 CRITICAL CARE FIRST HOUR: CPT

## 2018-12-22 RX ORDER — ACETAMINOPHEN 500 MG
650 TABLET ORAL EVERY 6 HOURS
Qty: 0 | Refills: 0 | Status: DISCONTINUED | OUTPATIENT
Start: 2018-12-22 | End: 2018-12-29

## 2018-12-22 RX ORDER — IRON SUCROSE 20 MG/ML
100 INJECTION, SOLUTION INTRAVENOUS EVERY 24 HOURS
Qty: 0 | Refills: 0 | Status: COMPLETED | OUTPATIENT
Start: 2018-12-22 | End: 2018-12-23

## 2018-12-22 RX ADMIN — Medication 650 MILLIGRAM(S): at 06:08

## 2018-12-22 RX ADMIN — IRON SUCROSE 210 MILLIGRAM(S): 20 INJECTION, SOLUTION INTRAVENOUS at 13:39

## 2018-12-22 RX ADMIN — Medication 75 MICROGRAM(S): at 05:57

## 2018-12-22 RX ADMIN — Medication 650 MILLIGRAM(S): at 17:30

## 2018-12-22 RX ADMIN — Medication 325 MILLIGRAM(S): at 17:30

## 2018-12-22 RX ADMIN — Medication 650 MILLIGRAM(S): at 07:44

## 2018-12-22 RX ADMIN — TAMSULOSIN HYDROCHLORIDE 0.4 MILLIGRAM(S): 0.4 CAPSULE ORAL at 22:25

## 2018-12-22 RX ADMIN — Medication 325 MILLIGRAM(S): at 05:57

## 2018-12-22 RX ADMIN — Medication 650 MILLIGRAM(S): at 05:57

## 2018-12-22 RX ADMIN — Medication 650 MILLIGRAM(S): at 13:38

## 2018-12-22 RX ADMIN — Medication 650 MILLIGRAM(S): at 22:25

## 2018-12-22 NOTE — PROGRESS NOTE ADULT - SUBJECTIVE AND OBJECTIVE BOX
CCU 2 MIDNIGHT ROUNDS    HPI:  NIGHT HOSPITALIST:   Patient UNKNOWN to me previously, assigned to me at this point via the ER and by Dr. Pulliam to admit this 86 y/o M--patient seen with patient's adult son in attendance--patient interviewed by examiner in patient's native Yoruba--son is bilingual and patient/son declined telephone --patient apparently with a history of gastric CA with patient consistently declining operative and standard chemotherapy options, with patient apparently on a mistletoe treatment (?) as a nonstandard approach with a group in [place] (?) following an evaluation in [place]--patient with progressive B/L LE oedema with progressive azotemia and with suprapubic fullness.   Patient with reported history of unspecified prostate disorder and follows Dr. Umaña above.  Apparently patient is now reconsidering treatment by Dr. Pacheco above but referred to the ER due to the progression of Cr.  Patient denies abdominal pain, no red blood per rectum or melena.  No chest pain/pressure.  No dyspnoea.  No back pain, no tearing back pain.   No fever, no chills, no rigors.   No HA, no focal weakness.   Patient with anorexia and unspecified involuntary weight loss.   No rash, no joint pain.   No dysuria, no haematuria.   Remaining review of systems not contributory. (19 Dec 2018 19:07)    Subjective/Observations: Pt. seen and examined and evaluated. Pt. resting comfortably in bed in NAD, with no respiratory distress, no chest pain, dyspnea, palpitations, PND, or orthopnea.      REVIEW OF SYSTEMS: All other review of systems is negative unless indicated above    PAST MEDICAL & SURGICAL HISTORY:  Benign prostatic hyperplasia, unspecified whether lower urinary tract symptoms present  Hypothyroidism, unspecified type  Malignant neoplasm of stomach, unspecified location  No significant past surgical history      MEDICATIONS  (STANDING):  ferrous    sulfate 325 milliGRAM(s) Oral two times a day  influenza   Vaccine 0.5 milliLiter(s) IntraMuscular once  iron sucrose IVPB 100 milliGRAM(s) IV Intermittent every 24 hours  levothyroxine 75 MICROGram(s) Oral daily  sodium bicarbonate 650 milliGRAM(s) Oral four times a day  tamsulosin 0.4 milliGRAM(s) Oral at bedtime    MEDICATIONS  (PRN):  acetaminophen   Tablet .. 650 milliGRAM(s) Oral every 6 hours PRN Mild Pain (1 - 3)      Allergies: No Known Allergies      Vital Signs Last 24 Hrs  T(C): 37.2 (22 Dec 2018 14:00), Max: 37.3 (22 Dec 2018 10:00)  T(F): 98.9 (22 Dec 2018 14:00), Max: 99.2 (22 Dec 2018 10:00)  HR: 78 (22 Dec 2018 18:00) (70 - 90)  BP: 164/80 (22 Dec 2018 18:00) (100/48 - 164/80)  BP(mean): 103 (22 Dec 2018 18:00) (58 - 103)  RR: 18 (22 Dec 2018 18:00) (13 - 23)  SpO2: 100% (22 Dec 2018 18:00) (95% - 100%)    I&O's Summary    21 Dec 2018 07:01  -  22 Dec 2018 07:00  --------------------------------------------------------  IN: 770 mL / OUT: 3625 mL / NET: -2855 mL    22 Dec 2018 07:01  -  22 Dec 2018 19:33  --------------------------------------------------------  IN: 400 mL / OUT: 740 mL / NET: -340 mL              LABS: All Labs Reviewed:                        8.6    8.5   )-----------( 244      ( 22 Dec 2018 05:48 )             25.0                         7.4    9.0   )-----------( 205      ( 21 Dec 2018 15:18 )             21.7                         7.6    8.6   )-----------( 212      ( 21 Dec 2018 09:10 )             23.5     22 Dec 2018 05:48    139    |  105    |  92     ----------------------------<  100    4.5     |  20     |  6.70   21 Dec 2018 15:20    136    |  102    |  94     ----------------------------<  258    5.0     |  18     |  6.99   21 Dec 2018 09:10    141    |  106    |  97     ----------------------------<  114    4.7     |  21     |  7.09     Ca    9.3        22 Dec 2018 05:48  Ca    8.7        21 Dec 2018 15:20  Ca    9.0        21 Dec 2018 09:10  Phos  3.2       22 Dec 2018 05:48  Phos  2.8       21 Dec 2018 15:20  Phos  3.2       21 Dec 2018 04:21  Mg     2.4       22 Dec 2018 05:48  Mg     2.2       21 Dec 2018 15:20  Mg     1.7       21 Dec 2018 04:21    TPro  6.1    /  Alb  3.1    /  TBili  0.2    /  DBili  x      /  AST  9      /  ALT  8      /  AlkPhos  86     22 Dec 2018 05:48  TPro  5.5    /  Alb  3.1    /  TBili  0.2    /  DBili  x      /  AST  10     /  ALT  6      /  AlkPhos  81     21 Dec 2018 15:20  TPro  5.1    /  Alb  2.9    /  TBili  x      /  DBili  x      /  AST  x      /  ALT  x      /  AlkPhos  x      20 Dec 2018 13:26               Echo:    Stress Testing:     Cath:    Imaging:    Interpretation of Telemetry:      Physical Exam:  Appearance: [ ] Normal  [ ] abnormal [ ] NAD   Eyes: [ ] PERRL [ ] EOMI  HEENT: [ ] Normal [ ] Abnormal oral mucosa [ ]NC/AT  Cardiovascular: [ ] S1 [ ] S2 [ ] RRR [ ] m/r/g [ ]edema [ ] JVP  Procedural Access Site: [ ]  hematoma [ ] tender to palpation [ ] 2+ pulse [ ] bruit [ ] Ecchymosis  Respiratory: [ ] Clear to auscultation bilaterally  Gastrointestinal: [ ] Soft [ ] tenderness[ ] distension [ ] BS  Musculoskeletal: [ ] clubbing [ ] joint deformity   Neurologic: [ ] Non-focal  Lymphatic: [ ] lymphadenopathy  Psychiatry: [ ] AAOx3  [ ] confused [ ] disoriented [ ] Mood & affect appropriate  Skin: [ ]  rashes [ ] ecchymoses [ ] cyanosis CCU 2 MIDNIGHT ROUNDS    HPI:  NIGHT HOSPITALIST:   Patient UNKNOWN to me previously, assigned to me at this point via the ER and by Dr. Pulliam to admit this 86 y/o M--patient seen with patient's adult son in attendance--patient interviewed by examiner in patient's native Italian--son is bilingual and patient/son declined telephone --patient apparently with a history of gastric CA with patient consistently declining operative and standard chemotherapy options, with patient apparently on a mistletoe treatment (?) as a nonstandard approach with a group in [place] (?) following an evaluation in [place]--patient with progressive B/L LE oedema with progressive azotemia and with suprapubic fullness.   Patient with reported history of unspecified prostate disorder and follows Dr. Umaña above.  Apparently patient is now reconsidering treatment by Dr. Pacheco above but referred to the ER due to the progression of Cr.  Patient denies abdominal pain, no red blood per rectum or melena.  No chest pain/pressure.  No dyspnoea.  No back pain, no tearing back pain.   No fever, no chills, no rigors.   No HA, no focal weakness.   Patient with anorexia and unspecified involuntary weight loss.   No rash, no joint pain.   No dysuria, no haematuria.   Remaining review of systems not contributory. (19 Dec 2018 19:07)    Subjective/Observations: Pt. seen and examined and evaluated. Pt. resting comfortably in bed in NAD, with no respiratory distress, no chest pain, dyspnea, palpitations, PND, or orthopnea.      REVIEW OF SYSTEMS: All other review of systems is negative unless indicated above    PAST MEDICAL & SURGICAL HISTORY:  Benign prostatic hyperplasia, unspecified whether lower urinary tract symptoms present  Hypothyroidism, unspecified type  Malignant neoplasm of stomach, unspecified location  No significant past surgical history      MEDICATIONS  (STANDING):  ferrous    sulfate 325 milliGRAM(s) Oral two times a day  influenza   Vaccine 0.5 milliLiter(s) IntraMuscular once  iron sucrose IVPB 100 milliGRAM(s) IV Intermittent every 24 hours  levothyroxine 75 MICROGram(s) Oral daily  sodium bicarbonate 650 milliGRAM(s) Oral four times a day  tamsulosin 0.4 milliGRAM(s) Oral at bedtime    MEDICATIONS  (PRN):  acetaminophen   Tablet .. 650 milliGRAM(s) Oral every 6 hours PRN Mild Pain (1 - 3)      Allergies: No Known Allergies      Vital Signs Last 24 Hrs  T(C): 37.2 (22 Dec 2018 14:00), Max: 37.3 (22 Dec 2018 10:00)  T(F): 98.9 (22 Dec 2018 14:00), Max: 99.2 (22 Dec 2018 10:00)  HR: 78 (22 Dec 2018 18:00) (70 - 90)  BP: 164/80 (22 Dec 2018 18:00) (100/48 - 164/80)  BP(mean): 103 (22 Dec 2018 18:00) (58 - 103)  RR: 18 (22 Dec 2018 18:00) (13 - 23)  SpO2: 100% (22 Dec 2018 18:00) (95% - 100%)    I&O's Summary    21 Dec 2018 07:01  -  22 Dec 2018 07:00  --------------------------------------------------------  IN: 770 mL / OUT: 3625 mL / NET: -2855 mL    22 Dec 2018 07:01  -  22 Dec 2018 19:33  --------------------------------------------------------  IN: 400 mL / OUT: 740 mL / NET: -340 mL              LABS: All Labs Reviewed:                      Echo:  < from: Transthoracic Echocardiogram (12.19.18 @ 18:58) >  Conclusions:  1. Normal mitral valve. Mild mitral regurgitation.  2. Calcified trileaflet aortic valve with decreased  opening. Peak transaortic valve gradient equals 10 mm Hg,  estimated aortic valve area equals 1.8 sqcm (by continuity  equation), aortic valve velocity time integral equals 40  cm, consistent with mild aortic stenosis. Mild-moderate  aortic regurgitation.  3. Normal left ventricular systolic function. No segmental  wall motion abnormalities.  4. Normal right ventricular size and function.  5. Normal tricuspid valve. Mild tricuspid regurgitation.  6. Large circumfrential pericardial effusion. The largest  pocket measures 1.8cm.  There is significant respiratory varaiation of the mitral  and tricuspid inflow.  There is partial collapse of the right atrium that is best  seen on subcostal views.  7. Bilateral pleural effusions.        Stress Testing:     Cath:  < from: Cardiac Cath Lab - Adult (12.20.18 @ 15:29) >  DIAGNOSTIC RECOMMENDATIONS: The patient should continue with the present  medications. Successful pericardicentesis with removal of 500 cc of  slighly blood tinged strawcolored fluid. Drain left in place  Prepared and signed by        Physical Exam:  Appearance: [ ] Normal  [ ] abnormal [ ] NAD   Eyes: [ ] PERRL [ ] EOMI  HEENT: [ ] Normal [ ] Abnormal oral mucosa [ ]NC/AT  Cardiovascular: [ ] S1 [ ] S2 [ ] RRR [ ] m/r/g [ ]edema [ ] JVP  Procedural Access Site: [ ]  hematoma [ ] tender to palpation [ ] 2+ pulse [ ] bruit [ ] Ecchymosis  Respiratory: [ ] Clear to auscultation bilaterally  Gastrointestinal: [ ] Soft [ ] tenderness[ ] distension [ ] BS  Musculoskeletal: [ ] clubbing [ ] joint deformity   Neurologic: [ ] Non-focal  Lymphatic: [ ] lymphadenopathy  Psychiatry: [ ] AAOx3  [ ] confused [ ] disoriented [ ] Mood & affect appropriate  Skin: [ ]  rashes [ ] ecchymoses [ ] cyanosis CCU 2 MIDNIGHT ROUNDS    HPI:   85y Male patient PMH gastric CA with patient consistently declining operative and standard chemotherapy options, on a non-standard treatment who is presenting with  progressive leg edema, azotemia and  suprapubic fullness. On ED CT scan to evaluate for METS he was found to have a large pericardial effusion STAT ECHO demonstrated echocardiographic findings of tamponade.  Cardiology consulted for further evaluation.     Subjective/Observations: Pt. seen and examined and evaluated. Pt. resting comfortably in bed in NAD, with no respiratory distress, no chest pain, dyspnea, palpitations, PND, or orthopnea.      REVIEW OF SYSTEMS: All other review of systems is negative unless indicated above    PAST MEDICAL & SURGICAL HISTORY:  Benign prostatic hyperplasia, unspecified whether lower urinary tract symptoms present  Hypothyroidism, unspecified type  Malignant neoplasm of stomach, unspecified location  No significant past surgical history      MEDICATIONS  (STANDING):  ferrous    sulfate 325 milliGRAM(s) Oral two times a day  influenza   Vaccine 0.5 milliLiter(s) IntraMuscular once  iron sucrose IVPB 100 milliGRAM(s) IV Intermittent every 24 hours  levothyroxine 75 MICROGram(s) Oral daily  sodium bicarbonate 650 milliGRAM(s) Oral four times a day  tamsulosin 0.4 milliGRAM(s) Oral at bedtime    MEDICATIONS  (PRN):  acetaminophen   Tablet .. 650 milliGRAM(s) Oral every 6 hours PRN Mild Pain (1 - 3)      Allergies: No Known Allergies      Vital Signs Last 24 Hrs  T(C): 37.2 (22 Dec 2018 14:00), Max: 37.3 (22 Dec 2018 10:00)  T(F): 98.9 (22 Dec 2018 14:00), Max: 99.2 (22 Dec 2018 10:00)  HR: 78 (22 Dec 2018 18:00) (70 - 90)  BP: 164/80 (22 Dec 2018 18:00) (100/48 - 164/80)  BP(mean): 103 (22 Dec 2018 18:00) (58 - 103)  RR: 18 (22 Dec 2018 18:00) (13 - 23)  SpO2: 100% (22 Dec 2018 18:00) (95% - 100%)    I&O's Summary    21 Dec 2018 07:01  -  22 Dec 2018 07:00  --------------------------------------------------------  IN: 770 mL / OUT: 3625 mL / NET: -2855 mL    22 Dec 2018 07:01  -  22 Dec 2018 19:33  --------------------------------------------------------  IN: 400 mL / OUT: 740 mL / NET: -340 mL          LABS: All Labs Reviewed:                      Echo:  < from: Transthoracic Echocardiogram (12.19.18 @ 18:58) >  Conclusions:  1. Normal mitral valve. Mild mitral regurgitation.  2. Calcified trileaflet aortic valve with decreased  opening. Peak transaortic valve gradient equals 10 mm Hg,  estimated aortic valve area equals 1.8 sqcm (by continuity  equation), aortic valve velocity time integral equals 40  cm, consistent with mild aortic stenosis. Mild-moderate  aortic regurgitation.  3. Normal left ventricular systolic function. No segmental  wall motion abnormalities.  4. Normal right ventricular size and function.  5. Normal tricuspid valve. Mild tricuspid regurgitation.  6. Large circumfrential pericardial effusion. The largest  pocket measures 1.8cm.  There is significant respiratory varaiation of the mitral  and tricuspid inflow.  There is partial collapse of the right atrium that is best  seen on subcostal views.  7. Bilateral pleural effusions.        Stress Testing:     Cath:  < from: Cardiac Cath Lab - Adult (12.20.18 @ 15:29) >  DIAGNOSTIC RECOMMENDATIONS: The patient should continue with the present  medications. Successful pericardicentesis with removal of 500 cc of  slighly blood tinged strawcolored fluid. Drain left in place  Prepared and signed by        Physical Exam:  Appearance: [ ] Normal  [ ] abnormal [ ] NAD   Eyes: [ ] PERRL [ ] EOMI  HEENT: [ ] Normal [ ] Abnormal oral mucosa [ ]NC/AT  Cardiovascular: [ ] S1 [ ] S2 [ ] RRR [ ] m/r/g [ ]edema [ ] JVP  Procedural Access Site: [ ]  hematoma [ ] tender to palpation [ ] 2+ pulse [ ] bruit [ ] Ecchymosis  Respiratory: [ ] Clear to auscultation bilaterally  Gastrointestinal: [ ] Soft [ ] tenderness[ ] distension [ ] BS  Musculoskeletal: [ ] clubbing [ ] joint deformity   Neurologic: [ ] Non-focal  Lymphatic: [ ] lymphadenopathy  Psychiatry: [ ] AAOx3  [ ] confused [ ] disoriented [ ] Mood & affect appropriate  Skin: [ ]  rashes [ ] ecchymoses [ ] cyanosis CCU 2 MIDNIGHT ROUNDS    HPI:   85y Male patient PMH gastric CA with patient consistently declining operative and standard chemotherapy options, on a non-standard treatment who is presenting with  progressive leg edema, azotemia and  suprapubic fullness. On ED CT scan to evaluate for METS he was found to have a large pericardial effusion STAT ECHO demonstrated echocardiographic findings of tamponade.  Cardiology consulted for further evaluation.     Subjective/Observations: Pt. seen and examined and evaluated. Pt. resting comfortably in bed in NAD, with no respiratory distress, no chest pain, dyspnea, palpitations, PND, or orthopnea.      REVIEW OF SYSTEMS: All other review of systems is negative unless indicated above    PAST MEDICAL & SURGICAL HISTORY:  Benign prostatic hyperplasia, unspecified whether lower urinary tract symptoms present  Hypothyroidism, unspecified type  Malignant neoplasm of stomach, unspecified location  No significant past surgical history      MEDICATIONS  (STANDING):  ferrous    sulfate 325 milliGRAM(s) Oral two times a day  influenza   Vaccine 0.5 milliLiter(s) IntraMuscular once  iron sucrose IVPB 100 milliGRAM(s) IV Intermittent every 24 hours  levothyroxine 75 MICROGram(s) Oral daily  sodium bicarbonate 650 milliGRAM(s) Oral four times a day  tamsulosin 0.4 milliGRAM(s) Oral at bedtime    MEDICATIONS  (PRN):  acetaminophen   Tablet .. 650 milliGRAM(s) Oral every 6 hours PRN Mild Pain (1 - 3)      Allergies: No Known Allergies      Vital Signs Last 24 Hrs  T(C): 37.2 (22 Dec 2018 14:00), Max: 37.3 (22 Dec 2018 10:00)  T(F): 98.9 (22 Dec 2018 14:00), Max: 99.2 (22 Dec 2018 10:00)  HR: 78 (22 Dec 2018 18:00) (70 - 90)  BP: 164/80 (22 Dec 2018 18:00) (100/48 - 164/80)  BP(mean): 103 (22 Dec 2018 18:00) (58 - 103)  RR: 18 (22 Dec 2018 18:00) (13 - 23)  SpO2: 100% (22 Dec 2018 18:00) (95% - 100%)    I&O's Summary    21 Dec 2018 07:01  -  22 Dec 2018 07:00  --------------------------------------------------------  IN: 770 mL / OUT: 3625 mL / NET: -2855 mL    22 Dec 2018 07:01  -  22 Dec 2018 19:33  --------------------------------------------------------  IN: 400 mL / OUT: 740 mL / NET: -340 mL          LABS: All Labs Reviewed:                      Echo:  < from: Transthoracic Echocardiogram (12.19.18 @ 18:58) >  Conclusions:  1. Normal mitral valve. Mild mitral regurgitation.  2. Calcified trileaflet aortic valve with decreased  opening. Peak transaortic valve gradient equals 10 mm Hg,  estimated aortic valve area equals 1.8 sqcm (by continuity  equation), aortic valve velocity time integral equals 40  cm, consistent with mild aortic stenosis. Mild-moderate  aortic regurgitation.  3. Normal left ventricular systolic function. No segmental  wall motion abnormalities.  4. Normal right ventricular size and function.  5. Normal tricuspid valve. Mild tricuspid regurgitation.  6. Large circumfrential pericardial effusion. The largest  pocket measures 1.8cm.  There is significant respiratory varaiation of the mitral  and tricuspid inflow.  There is partial collapse of the right atrium that is best  seen on subcostal views.  7. Bilateral pleural effusions.      Imagining:  < from: CT Abdomen and Pelvis No Cont (12.19.18 @ 16:49) >  IMPRESSION:     Large pericardial effusion and small left pleural effusion.    Bilateral hydroureteronephrosis, severe on the left and moderate on the   right, to the level of the urinary bladder. Small bladder calculi are   noted in the region of the ureterovesical junction on the left. Marked   urinary bladder wall thickening and perivesicular stranding may be   related to chronic outlet obstruction from an enlarged prostate.   Correlate with urinalysis to evaluate for cystitis. Underlying bladder   neoplasm cannot be excluded.    Thickening of the gastric wall in the region of the fundus likely   corresponds with patient's known gastric cancer. In addition, there is   gastro- hepatic ligament adenopathy. Correlation with prior imaging is   recommended.          Cath:  < from: Cardiac Cath Lab - Adult (12.20.18 @ 15:29) >  DIAGNOSTIC RECOMMENDATIONS: The patient should continue with the present  medications. Successful pericardicentesis with removal of 500 cc of  slighly blood tinged strawcolored fluid. Drain left in place  Prepared and signed by        Physical Exam:  Appearance: [ ] Normal  [ ] abnormal [ ] NAD   Eyes: [ ] PERRL [ ] EOMI  HEENT: [ ] Normal [ ] Abnormal oral mucosa [ ]NC/AT  Cardiovascular: [ ] S1 [ ] S2 [ ] RRR [ ] m/r/g [ ]edema [ ] JVP  Procedural Access Site: [ ]  hematoma [ ] tender to palpation [ ] 2+ pulse [ ] bruit [ ] Ecchymosis  Respiratory: [ ] Clear to auscultation bilaterally  Gastrointestinal: [ ] Soft [ ] tenderness[ ] distension [ ] BS  Musculoskeletal: [ ] clubbing [ ] joint deformity   Neurologic: [ ] Non-focal  Lymphatic: [ ] lymphadenopathy  Psychiatry: [ ] AAOx3  [ ] confused [ ] disoriented [ ] Mood & affect appropriate  Skin: [ ]  rashes [ ] ecchymoses [ ] cyanosis CCU 2 MIDNIGHT ROUNDS    HPI:   85y Male patient PMH gastric CA with patient consistently declining operative and standard chemotherapy options, on a non-standard treatment who is presenting with  progressive leg edema, azotemia and  suprapubic fullness. On ED CT scan to evaluate for METS he was found to have a large pericardial effusion STAT ECHO demonstrated echocardiographic findings of tamponade.  Cardiology consulted for further evaluation.     Subjective/Observations: Pt. seen and examined and evaluated. Pt. resting comfortably in bed in NAD, with no respiratory distress, no chest pain, dyspnea, palpitations, PND, or orthopnea.      REVIEW OF SYSTEMS: All other review of systems is negative unless indicated above    PAST MEDICAL & SURGICAL HISTORY:  Benign prostatic hyperplasia, unspecified whether lower urinary tract symptoms present  Hypothyroidism, unspecified type  Malignant neoplasm of stomach, unspecified location  No significant past surgical history      MEDICATIONS  (STANDING):  ferrous    sulfate 325 milliGRAM(s) Oral two times a day  influenza   Vaccine 0.5 milliLiter(s) IntraMuscular once  iron sucrose IVPB 100 milliGRAM(s) IV Intermittent every 24 hours  levothyroxine 75 MICROGram(s) Oral daily  sodium bicarbonate 650 milliGRAM(s) Oral four times a day  tamsulosin 0.4 milliGRAM(s) Oral at bedtime    MEDICATIONS  (PRN):  acetaminophen   Tablet .. 650 milliGRAM(s) Oral every 6 hours PRN Mild Pain (1 - 3)      Allergies: No Known Allergies      Vital Signs Last 24 Hrs  T(C): 37.2 (22 Dec 2018 14:00), Max: 37.3 (22 Dec 2018 10:00)  T(F): 98.9 (22 Dec 2018 14:00), Max: 99.2 (22 Dec 2018 10:00)  HR: 78 (22 Dec 2018 18:00) (70 - 90)  BP: 164/80 (22 Dec 2018 18:00) (100/48 - 164/80)  BP(mean): 103 (22 Dec 2018 18:00) (58 - 103)  RR: 18 (22 Dec 2018 18:00) (13 - 23)  SpO2: 100% (22 Dec 2018 18:00) (95% - 100%)    I&O's Summary    21 Dec 2018 07:01  -  22 Dec 2018 07:00  --------------------------------------------------------  IN: 770 mL / OUT: 3625 mL / NET: -2855 mL    22 Dec 2018 07:01  -  22 Dec 2018 19:33  --------------------------------------------------------  IN: 400 mL / OUT: 740 mL / NET: -340 mL          LABS: All Labs Reviewed:                      Echo:  < from: Transthoracic Echocardiogram (12.19.18 @ 18:58) >  Conclusions:  1. Normal mitral valve. Mild mitral regurgitation.  2. Calcified trileaflet aortic valve with decreased  opening. Peak transaortic valve gradient equals 10 mm Hg,  estimated aortic valve area equals 1.8 sqcm (by continuity  equation), aortic valve velocity time integral equals 40  cm, consistent with mild aortic stenosis. Mild-moderate  aortic regurgitation.  3. Normal left ventricular systolic function. No segmental  wall motion abnormalities.  4. Normal right ventricular size and function.  5. Normal tricuspid valve. Mild tricuspid regurgitation.  6. Large circumfrential pericardial effusion. The largest  pocket measures 1.8cm.  There is significant respiratory varaiation of the mitral  and tricuspid inflow.  There is partial collapse of the right atrium that is best  seen on subcostal views.  7. Bilateral pleural effusions.      Imagining:  < from: CT Abdomen and Pelvis No Cont (12.19.18 @ 16:49) >  IMPRESSION:     Large pericardial effusion and small left pleural effusion.    Bilateral hydroureteronephrosis, severe on the left and moderate on the   right, to the level of the urinary bladder. Small bladder calculi are   noted in the region of the ureterovesical junction on the left. Marked   urinary bladder wall thickening and perivesicular stranding may be   related to chronic outlet obstruction from an enlarged prostate.   Correlate with urinalysis to evaluate for cystitis. Underlying bladder   neoplasm cannot be excluded.    Thickening of the gastric wall in the region of the fundus likely   corresponds with patient's known gastric cancer. In addition, there is   gastro- hepatic ligament adenopathy. Correlation with prior imaging is   recommended.          Cath:  < from: Cardiac Cath Lab - Adult (12.20.18 @ 15:29) >  DIAGNOSTIC RECOMMENDATIONS: The patient should continue with the present  medications. Successful pericardicentesis with removal of 500 cc of  slighly blood tinged strawcolored fluid. Drain left in place  Prepared and signed by        Physical Exam:  Appearance: [ ] Normal  [ ] abnormal [X] NAD   Eyes: [ ] PERRL [ ] EOMI  HEENT: [ ] Normal [ ] Abnormal oral mucosa [ ]NC/AT  Cardiovascular: [X] S1 [X] S2 [ ] RRR [ ] m/r/g [ ]edema [ ] JVP  Procedural Access Site: [X ] mid abdominal drain noted site benign no redness noted  Respiratory: [X] upper B/L lobes clear to auscultation lower lobes diminished   Gastrointestinal: [ ] Soft [ ] tenderness[ ] distension [ ] BS  Musculoskeletal: [ ] clubbing [ ] joint deformity   Neurologic: [ ] Non-focal  Lymphatic: [ ] lymphadenopathy  Psychiatry: [X] AAOx3  [ ] confused [ ] disoriented [ ] Mood & affect appropriate  Skin: [ ]  rashes [ ] ecchymoses [ ] cyanosis CCU 2 MIDNIGHT ROUNDS    HPI:   85y Male patient PMH gastric CA with patient consistently declining operative and standard chemotherapy options, on a non-standard treatment who is presenting with  progressive leg edema, azotemia and  suprapubic fullness. On ED CT scan to evaluate for METS he was found to have a large pericardial effusion STAT ECHO demonstrated echocardiographic findings of tamponade.  Cardiology consulted for further evaluation.     Subjective/Observations: Pt. seen and examined and evaluated. Pt. resting comfortably in bed in NAD, with no respiratory distress, no chest pain, dyspnea, palpitations, PND, or orthopnea.      REVIEW OF SYSTEMS: All other review of systems is negative unless indicated above    PAST MEDICAL & SURGICAL HISTORY:  Benign prostatic hyperplasia, unspecified whether lower urinary tract symptoms present  Hypothyroidism, unspecified type  Malignant neoplasm of stomach, unspecified location  No significant past surgical history      MEDICATIONS  (STANDING):  ferrous    sulfate 325 milliGRAM(s) Oral two times a day  influenza   Vaccine 0.5 milliLiter(s) IntraMuscular once  iron sucrose IVPB 100 milliGRAM(s) IV Intermittent every 24 hours  levothyroxine 75 MICROGram(s) Oral daily  sodium bicarbonate 650 milliGRAM(s) Oral four times a day  tamsulosin 0.4 milliGRAM(s) Oral at bedtime    MEDICATIONS  (PRN):  acetaminophen   Tablet .. 650 milliGRAM(s) Oral every 6 hours PRN Mild Pain (1 - 3)      Allergies: No Known Allergies      Vital Signs Last 24 Hrs  T(C): 37.2 (22 Dec 2018 14:00), Max: 37.3 (22 Dec 2018 10:00)  T(F): 98.9 (22 Dec 2018 14:00), Max: 99.2 (22 Dec 2018 10:00)  HR: 78 (22 Dec 2018 18:00) (70 - 90)  BP: 164/80 (22 Dec 2018 18:00) (100/48 - 164/80)  BP(mean): 103 (22 Dec 2018 18:00) (58 - 103)  RR: 18 (22 Dec 2018 18:00) (13 - 23)  SpO2: 100% (22 Dec 2018 18:00) (95% - 100%)    I&O's Summary    21 Dec 2018 07:01  -  22 Dec 2018 07:00  --------------------------------------------------------  IN: 770 mL / OUT: 3625 mL / NET: -2855 mL    22 Dec 2018 07:01  -  22 Dec 2018 19:33  --------------------------------------------------------  IN: 400 mL / OUT: 740 mL / NET: -340 mL          LABS: All Labs Reviewed:                         7.8    7.1   )-----------( 246      ( 23 Dec 2018 05:35 )             22.7   12-23    138  |  104  |  89<H>  ----------------------------<  96  4.5   |  20<L>  |  6.14<H>    Ca    9.0      23 Dec 2018 05:35  Phos  3.3     12-23  Mg     2.1     12-23    TPro  5.7<L>  /  Alb  2.9<L>  /  TBili  0.1<L>  /  DBili  x   /  AST  13  /  ALT  8<L>  /  AlkPhos  85  12-23                     Echo:  < from: Transthoracic Echocardiogram (12.19.18 @ 18:58) >  Conclusions:  1. Normal mitral valve. Mild mitral regurgitation.  2. Calcified trileaflet aortic valve with decreased  opening. Peak transaortic valve gradient equals 10 mm Hg,  estimated aortic valve area equals 1.8 sqcm (by continuity  equation), aortic valve velocity time integral equals 40  cm, consistent with mild aortic stenosis. Mild-moderate  aortic regurgitation.  3. Normal left ventricular systolic function. No segmental  wall motion abnormalities.  4. Normal right ventricular size and function.  5. Normal tricuspid valve. Mild tricuspid regurgitation.  6. Large circumfrential pericardial effusion. The largest  pocket measures 1.8cm.  There is significant respiratory varaiation of the mitral  and tricuspid inflow.  There is partial collapse of the right atrium that is best  seen on subcostal views.  7. Bilateral pleural effusions.      Imagining:  < from: CT Abdomen and Pelvis No Cont (12.19.18 @ 16:49) >  IMPRESSION:     Large pericardial effusion and small left pleural effusion.    Bilateral hydroureteronephrosis, severe on the left and moderate on the   right, to the level of the urinary bladder. Small bladder calculi are   noted in the region of the ureterovesical junction on the left. Marked   urinary bladder wall thickening and perivesicular stranding may be   related to chronic outlet obstruction from an enlarged prostate.   Correlate with urinalysis to evaluate for cystitis. Underlying bladder   neoplasm cannot be excluded.    Thickening of the gastric wall in the region of the fundus likely   corresponds with patient's known gastric cancer. In addition, there is   gastro- hepatic ligament adenopathy. Correlation with prior imaging is   recommended.          Cath:  < from: Cardiac Cath Lab - Adult (12.20.18 @ 15:29) >  DIAGNOSTIC RECOMMENDATIONS: The patient should continue with the present  medications. Successful pericardicentesis with removal of 500 cc of  slighly blood tinged strawcolored fluid. Drain left in place  Prepared and signed by        Physical Exam:  Appearance: [ ] Normal  [ ] abnormal [X] NAD   Eyes: [ ] PERRL [ ] EOMI  HEENT: [ ] Normal [ ] Abnormal oral mucosa [ ]NC/AT  Cardiovascular: [X] S1 [X] S2 [ ] RRR [ ] m/r/g [ ]edema [ ] JVP  Procedural Access Site: [X ] mid abdominal drain noted site benign no redness noted  Respiratory: [X] upper B/L lobes clear to auscultation lower lobes diminished   Gastrointestinal: [ ] Soft [ ] tenderness[ ] distension [ ] BS  Musculoskeletal: [ ] clubbing [ ] joint deformity   Neurologic: [ ] Non-focal  Lymphatic: [ ] lymphadenopathy  Psychiatry: [X] AAOx3  [ ] confused [ ] disoriented [ ] Mood & affect appropriate  Skin: [ ]  rashes [ ] ecchymoses [ ] cyanosis

## 2018-12-22 NOTE — PROGRESS NOTE ADULT - SUBJECTIVE AND OBJECTIVE BOX
CCU2/PICU NOTE    Admission date: 12/19/18  Chief complaint/ Diagnosis: pericardial tamponade s/p pericardial drain   HPI: 86 y/o M w/p gastric ca( declining any tx) p/w bilateral leg edema and azotemia in setting of pericardial effusion s/p pericardial drainage    Interval history: Uneventful overnight  Pericardial drainage 200ml x 24hrs    REVIEW OF SYSTEMS  Denies CP, Palpitation, SOB, Dyspnea [x  ] All other systems negative    MEDICATIONS  (STANDING)  ferrous    sulfate 325 milliGRAM(s) Oral two times a day  influenza   Vaccine 0.5 milliLiter(s) IntraMuscular once  levothyroxine 75 MICROGram(s) Oral daily  sodium bicarbonate 650 milliGRAM(s) Oral four times a day  tamsulosin 0.4 milliGRAM(s) Oral at bedtime    MEDICATIONS  (PRN): acetaminophen   Tablet .. 650 milliGRAM(s) Oral every 6 hours PRN Mild Pain (1 - 3)    Allergy: No Known Allergies    ICU Vital Signs Last 24 Hrs  T(C): 36.6 (Max: 37.1)  HR: 70 (70 - 106)  BP: 131/67 (89/53 - 142/55)  RR: 15  (13 - 23)  SpO2: 98%  (95% - 100%)      I&O's Summary  IN: 650 mL / OUT: 3625 mL(150-200cc/hr) / NET: -2975 mL    PHYSICAL EXAM  Appearance: Normal, NAD  HEAD: Normocephalic  EYES: PERRLA, conjunctiva and sclera clear  NECK: Supple, No JVD  CHEST/LUNG: Clear to auscultation bilaterally; No wheezing  HEART: Normal S1, S2. No murmurs, rubs, or gallops  ABDOMEN: + Bowel sounds, Soft, NT, ND   EXTREMITIES:  2+ Peripheral Pulses, No clubbing, cyanosis, or edema  NEUROLOGY: non-focal, aaox3  SKIN: No rashes or lesions    Interpretation of Telemetry: NSR            8.6<7.4<7.6    8.5   )-----------( 244                  25.0     139  |  105  |  92<H>  ----------------------------<  100<H>  4.5   |  20<L>  | 6.70<6.99<7.09    Ca    9.3      Phos  3.2   Mg     2.4    TPro  6.1  /  Alb  3.1<L>  /  TBili  0.2  /  DBili  x   /  AST  9<L>  /  ALT  8<L>  /  AlkPhos  86        CT Abdomen and Pelvis No Cont (12.19.18)  IMPRESSION:   Large pericardial effusion and small left pleural effusion.  Bilateral hydroureteronephrosis, severe on the left and moderate on the right, to the level of the urinary bladder. Small bladder calculi are  noted in the region of the ureterovesical junction on the left. Marked  urinary bladder wall thickening and perivesicular stranding may be  related to chronic outlet obstruction from an enlarged prostate.  Correlate with urinalysis to evaluate for cystitis. Underlying bladder  neoplasm cannot be excluded.    Thickening of the gastric wall in the region of the fundus likely  corresponds with patient's known gastric cancer. In addition, there is  gastro- hepatic ligament adenopathy. Correlation with prior imaging is recommended.

## 2018-12-22 NOTE — PROGRESS NOTE ADULT - ASSESSMENT
84 y/o M-patient apparently with a history of gastric CA  non chemo treated with STELLA obstructive uropathy with hyperkalemia on recent blood work with pericardial tamponade on echo       1- STELLA   2- pericardial tamponade/effusion  3- obstructive uropathy    s/p pericardiocentesis  cont ayala  cr slightly better. has good uo.   monitor renal function for improvement  prbc if hb lower;  and procrit 12815 units tiw   pt is not candidate for dialysis due to his gastric cancer  suspect stella in setting of ATN due to decrease cardiac outpt due to pericardial effusion as well as urinary retention likely

## 2018-12-22 NOTE — PHYSICAL THERAPY INITIAL EVALUATION ADULT - GENERAL OBSERVATIONS, REHAB EVAL
Pt rec'd in bed, +pericardial drain, +ayala, NAD. Pt Danish speaking, understands basic English.  services used (Anselmo [id number]), but then son Markel arrived to translate as needed. Pt reported feeling some dizziness lying in bed at baseline. Pt with some c/o of a VILLANUEVA with activity. CELINA Gongora aware.

## 2018-12-22 NOTE — PHYSICAL THERAPY INITIAL EVALUATION ADULT - PERTINENT HX OF CURRENT PROBLEM, REHAB EVAL
86 y/o male with PMH BPH, hypothyroidism, stage V chronic kidney disease (not on chronic dialysis), gastric CA (declining tx), a/w urinary retention, obstructive uropathy, b/l leg edema, azotemia in setting of pericardial effusion s/p pericardial drainage.

## 2018-12-22 NOTE — PROGRESS NOTE ADULT - SUBJECTIVE AND OBJECTIVE BOX
Patient is a 85y old  Male who presents with a chief complaint of Worsening creatinine with suprapubic fullness (22 Dec 2018 07:34)       Pt is seen and examined  pt is awake and lying in bed  pt seems comfortable    MEDICATIONS  (STANDING):  ferrous    sulfate 325 milliGRAM(s) Oral two times a day  influenza   Vaccine 0.5 milliLiter(s) IntraMuscular once  iron sucrose IVPB 100 milliGRAM(s) IV Intermittent every 24 hours  levothyroxine 75 MICROGram(s) Oral daily  sodium bicarbonate 650 milliGRAM(s) Oral four times a day  tamsulosin 0.4 milliGRAM(s) Oral at bedtime    MEDICATIONS  (PRN):  acetaminophen   Tablet .. 650 milliGRAM(s) Oral every 6 hours PRN Mild Pain (1 - 3)      Allergies    No Known Allergies    Intolerances        Vital Signs Last 24 Hrs  T(C): 37.3 (22 Dec 2018 10:00), Max: 37.3 (22 Dec 2018 10:00)  T(F): 99.2 (22 Dec 2018 10:00), Max: 99.2 (22 Dec 2018 10:00)  HR: 81 (22 Dec 2018 12:00) (70 - 90)  BP: 104/50 (22 Dec 2018 12:00) (100/48 - 142/55)  BP(mean): 58 (22 Dec 2018 10:00) (58 - 84)  RR: 15 (22 Dec 2018 12:00) (13 - 23)  SpO2: 98% (22 Dec 2018 12:00) (95% - 100%)        LABS:                          8.6    8.5   )-----------( 244      ( 22 Dec 2018 05:48 )             25.0         Mean Cell Volume : 91.5 fl  Mean Cell Hemoglobin : 31.5 pg  Mean Cell Hemoglobin Concentration : 34.4 gm/dL  Auto Neutrophil # : x  Auto Lymphocyte # : x  Auto Monocyte # : x  Auto Eosinophil # : x  Auto Basophil # : x  Auto Neutrophil % : x  Auto Lymphocyte % : x  Auto Monocyte % : x  Auto Eosinophil % : x  Auto Basophil % : x    Serial CBC's  12-22 @ 05:48  Hct-25.0 / Hgb-8.6 / Plat-244 / RBC-2.73 / WBC-8.5          Serial CBC's  12-21 @ 15:18  Hct-21.7 / Hgb-7.4 / Plat-205 / RBC-2.36 / WBC-9.0          Serial CBC's  12-21 @ 09:10  Hct-23.5 / Hgb-7.6 / Plat-212 / RBC-2.59 / WBC-8.6          Serial CBC's  12-21 @ 04:21  Hct-21.5 / Hgb-7.7 / Plat-203 / RBC-2.39 / WBC-8.4          Serial CBC's  12-20 @ 08:14  Hct-25.9 / Hgb-8.3 / Plat-198 / RBC-2.81 / WBC-8.19            12-22    139  |  105  |  92<H>  ----------------------------<  100<H>  4.5   |  20<L>  |  6.70<H>    Ca    9.3      22 Dec 2018 05:48  Phos  3.2     12-22  Mg     2.4     12-22    TPro  6.1  /  Alb  3.1<L>  /  TBili  0.2  /  DBili  x   /  AST  9<L>  /  ALT  8<L>  /  AlkPhos  86  12-22          WBC Count: 8.5 K/uL (12-22-18 @ 05:48)  Hemoglobin: 8.6 g/dL (12-22-18 @ 05:48)  Hematocrit: 25.0 % (12-22-18 @ 05:48)  Platelet Count - Automated: 244 K/uL (12-22-18 @ 05:48)  Ferritin, Serum: 103 ng/mL (12-21-18 @ 17:25)  Iron - Total Binding Capacity.: 157 ug/dL (12-21-18 @ 17:25)  WBC Count: 9.0 K/uL (12-21-18 @ 15:18)  Hemoglobin: 7.4 g/dL (12-21-18 @ 15:18)  Hematocrit: 21.7 % (12-21-18 @ 15:18)  Platelet Count - Automated: 205 K/uL (12-21-18 @ 15:18)  WBC Count: 8.6 K/uL (12-21-18 @ 09:10)  Hemoglobin: 7.6 g/dL (12-21-18 @ 09:10)  Hematocrit: 23.5 % (12-21-18 @ 09:10)  Platelet Count - Automated: 212 K/uL (12-21-18 @ 09:10)  WBC Count: 8.4 K/uL (12-21-18 @ 04:21)  Hemoglobin: 7.7 g/dL (12-21-18 @ 04:21)  Hematocrit: 21.5 % (12-21-18 @ 04:21)  Platelet Count - Automated: 203 K/uL (12-21-18 @ 04:21)  WBC Count: 8.19 K/uL (12-20-18 @ 08:14)  Hemoglobin: 8.3 g/dL (12-20-18 @ 08:14)  Hematocrit: 25.9 % (12-20-18 @ 08:14)  Platelet Count - Automated: 198 K/uL (12-20-18 @ 08:14)  Hemoglobin: 8.5 g/dL (12-19-18 @ 15:21)  Platelet Count - Automated: 224 K/uL (12-19-18 @ 15:21)  Hematocrit: 24.0 % (12-19-18 @ 15:21)  WBC Count: 8.1 K/uL (12-19-18 @ 15:21)      Serum Protein Electrophoresis Interp: Normal Electrophoresis Pattern (12-20 @ 13:26)  Immunofixation, Serum:   No Monoclonal Band Identified (12-20 @ 13:26)                  RADIOLOGY & ADDITIONAL STUDIES:

## 2018-12-22 NOTE — PHYSICAL THERAPY INITIAL EVALUATION ADULT - ADDITIONAL COMMENTS
Pt lives with son on the second floor (+handrail) of a 2 family home. Pt states prior to admission being independent with all functional mobility and ADLs. Pt was ambulatory with a quad cane for the last 6 months 2/2 c/o dizziness. Pt has a shower chair. Pt was supervised on steps prior to admission.

## 2018-12-22 NOTE — PHYSICAL THERAPY INITIAL EVALUATION ADULT - PLANNED THERAPY INTERVENTIONS, PT EVAL
gait training/GOAL: Pt will negotiate 9 steps with unilateral handrail and step-to pattern with supervision in 2 weeks/transfer training/strengthening

## 2018-12-22 NOTE — PROGRESS NOTE ADULT - ASSESSMENT
84 y/o M w/p gastric ca( declining any tx) p/w bilateral leg edema and azotemia in setting of pericardial effusion s/p pericardial drainage    #CV: Percardial effusion s/p pericardial drainage   - Cont. Monitor I/O and pericardial drainage  - F/U Cytology  - F/U TTE  #GI: Gastric cancer (declining tx)  -Need GOC discussion and possible palliative care consult?  # Renal: Azotemia   - unknown baseline Cr, hx of BPH  - Cr improving and auto diuresing   - Monitor U/O and BUN/Cr  - Cont Flomax   # Normocytic Anemia: Follow up w/ iron study  - Cont. Ferrous sulfate   #Dispo- Ambulate as tolerates

## 2018-12-22 NOTE — PROGRESS NOTE ADULT - ASSESSMENT
86 yo man with  history of gastric CA  has never tx'd with chemotherapy and  was sent to ER for staging. CT showed large pericardial effusion. Pt is now in PICU with a drain in chest draining pericardial fluid.    12-21-18: R/O iron deficiency: check ferritin, b12/ folate    12-22-18: received Venofer this morning. Persistent odynophagia. awit pathology of pericardial fluid.

## 2018-12-22 NOTE — PROGRESS NOTE ADULT - ATTENDING COMMENTS
Patient presented with large pericardial effusion in the setting of untreated gastric ca, now status post drainage of blood effusion.  Monitor output into pericardial drain (50 cc overnight).  Follow-up repeat TTE.    When drainage stops and effusion has resolved, will discontinue drain.    Need to clarify goals of care. Patient has previously elected not to pursue traditional therapy, but his insight into his current condition is unclear.  Follow-up heme/onc impression of prognosis and recommendations.  Follow-up nephrology recommendations for obstructive uropathy.    Patient reports discomfort with eating. May need GI evaluation for potential stenting vs. PEG. If patient is considering PEG, he should first be evaluated by Palliative Care for a broader goals of care discussion.

## 2018-12-22 NOTE — PROGRESS NOTE ADULT - ASSESSMENT
gastric  cancer-obstructive uropathy-ckd 5 - hyperkalemia- pericardial effusion s/p drainage gastric  cancer-obstructive uropathy-ckd 5 - hyperkalemia- pericardial effusion s/p drainage-drain in place  case  dw dr jade at  bedside- followup echo  and  cytology gastric  cancer-obstructive uropathy-ckd 5 - hyperkalemia- pericardial effusion s/p drainage-drain in place-anemia-renal failure-not  candidate for dialysis per renal  case  dw dr jade at  bedside- followup echo  and  cytology

## 2018-12-22 NOTE — PROGRESS NOTE ADULT - ASSESSMENT
Pericardial effusion w/ tamponade s/p drain (12/20)  - TTE today revealing small pericardial effusion, no tamponade, normal RVF  - Continue to monitor drain output (200ml in 24hr)  - F/U cytology     #Urinary retention s/p Byrnes (12/19)  - Start Flomax 0.4mg po daily   - Urology consult appreciated     #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, w/ underline obstructive uropathy   - Monitor renal function & urine output   - Renally dose meds, avoid nephrotoxins   - Not a HD candidate 2/2 gastric CA  - Continue HCO3 po qid   - F/U Nephro recs     #Hematuria; resolving (likely catheter induced trauma)  - Monitor urine; hematuria resolving   - Trend CBC    #Gastric CA; refusing treatment   - Consider Palliative consult   - GOC discussion   - F/U Onc recs    #Iron deficiency anemia; likely underline anemia of chronic disease   - Start ferrous sulfate 325mg po bid   - H/H stable; trend CBC daily   - Keep Hgb > 7, transfuse prn   - Hematuria resolving   - Fecal occult 86 y/o M- with a history of gastric CA with patient consistently, declining operative and standard chemotherapy options with progressive B/L LE edema with progressive azotemia and with suprapubic fullness. S/P pericardial drain for tamponade physiology      #CV: Percardial effusion s/p pericardial drainage   - Cont. Monitor I/O and pericardial drainage  - F/U Cytology  - F/U TTE  #GI: Gastric cancer (declining tx)  -Need GOC discussion and possible palliative care consult?  # Renal: Azotemia   - unknown baseline Cr, hx of BPH  - Cr improving and auto diuresing   - Monitor U/O and BUN/Cr  - Cont Flomax   # Normocytic Anemia: Follow up w/ iron study  - Cont. Ferrous sulfate   #Dispo- Ambulate as tolerates       Patient presented with large pericardial effusion in the setting of untreated gastric ca, now status post drainage of blood effusion.  Monitor output into pericardial drain (50 cc overnight).  Follow-up repeat TTE.    When drainage stops and effusion has resolved, will discontinue drain.    Need to clarify goals of care. Patient has previously elected not to pursue traditional therapy, but his insight into his current condition is unclear.  Follow-up heme/onc impression of prognosis and recommendations.  Follow-up nephrology recommendations for obstructive uropathy.    Patient reports discomfort with eating. May need GI evaluation for potential stenting vs. PEG. If patient is considering PEG, he should first be evaluated by Palliative Care for a broader goals of care discussion.        Pericardial effusion w/ tamponade s/p drain (12/20)  - TTE today revealing small pericardial effusion, no tamponade, normal RVF  - Continue to monitor drain output (200ml in 24hr)  - F/U cytology     #Urinary retention s/p Byrnes (12/19)  - Start Flomax 0.4mg po daily   - Urology consult appreciated     #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, w/ underline obstructive uropathy   - Monitor renal function & urine output   - Renally dose meds, avoid nephrotoxins   - Not a HD candidate 2/2 gastric CA  - Continue HCO3 po qid   - F/U Nephro recs     #Hematuria; resolving (likely catheter induced trauma)  - Monitor urine; hematuria resolving   - Trend CBC    #Gastric CA; refusing treatment   - Consider Palliative consult   - GOC discussion   - F/U Onc recs    #Iron deficiency anemia; likely underline anemia of chronic disease   - Start ferrous sulfate 325mg po bid   - H/H stable; trend CBC daily   - Keep Hgb > 7, transfuse prn   - Hematuria resolving   - Fecal occult 86 y/o M- with a history of gastric CA with patient consistently, declining operative and standard chemotherapy options with progressive B/L LE edema with progressive azotemia and with suprapubic fullness. S/P pericardial drain for tamponade physiology    #CV: Percardial effusion s/p pericardial drainage   - Cont. Monitor I/O and pericardial drainage  - F/U Cytology  - F/U TTE  #GI: Gastric cancer (declining tx)  -Need GOC discussion and possible palliative care consult?  # Renal: Azotemia   - unknown baseline Cr, hx of BPH  - Cr improving and auto diuresing   - Monitor U/O and BUN/Cr  - Cont Flomax   # Normocytic Anemia: Follow up w/ iron study  - Cont. Ferrous sulfate   #Dispo- Ambulate as tolerates       Patient presented with large pericardial effusion in the setting of untreated gastric ca, now status post drainage of blood effusion.  Monitor output into pericardial drain (50 cc overnight).  Follow-up repeat TTE.    When drainage stops and effusion has resolved, will discontinue drain.    Need to clarify goals of care. Patient has previously elected not to pursue traditional therapy, but his insight into his current condition is unclear.  Follow-up heme/onc impression of prognosis and recommendations.  Follow-up nephrology recommendations for obstructive uropathy.    Patient reports discomfort with eating. May need GI evaluation for potential stenting vs. PEG. If patient is considering PEG, he should first be evaluated by Palliative Care for a broader goals of care discussion.        Pericardial effusion w/ tamponade s/p drain (12/20)  - TTE today revealing small pericardial effusion, no tamponade, normal RVF  - Continue to monitor drain output (200ml in 24hr)  - F/U cytology     #Urinary retention s/p Byrnes (12/19)  - Start Flomax 0.4mg po daily   - Urology consult appreciated     #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, w/ underline obstructive uropathy   - Monitor renal function & urine output   - Renally dose meds, avoid nephrotoxins   - Not a HD candidate 2/2 gastric CA  - Continue HCO3 po qid   - F/U Nephro recs     #Hematuria; resolving (likely catheter induced trauma)  - Monitor urine; hematuria resolving   - Trend CBC    #Gastric CA; refusing treatment   - Consider Palliative consult   - GOC discussion   - F/U Onc recs    #Iron deficiency anemia; likely underline anemia of chronic disease   - Start ferrous sulfate 325mg po bid   - H/H stable; trend CBC daily   - Keep Hgb > 7, transfuse prn   - Hematuria resolving   - Fecal occult 86 y/o M- with a history of gastric CA with patient consistently, declining operative and standard chemotherapy options with progressive B/L LE edema with progressive azotemia and with suprapubic fullness. S/P pericardial drain for tamponade physiology. When drainage stops and effusion has resolved, will discontinue drain.      #CV: Percardial effusion s/p pericardial drainage   - Cont. Monitor I/O and pericardial drainage  - F/U Cytology  - F/U TTE  #GI: Gastric cancer (declining tx)  - Need GOC discussion and possible palliative care consult?  - F/U Oncology recommendations       #Urinary retention s/p Byrnes (12/19)  - Start Flomax 0.4mg po daily   - Urology consult appreciated     # Renal: Azotemia 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, w/ underline obstructive uropathy   - unknown baseline Cr, hx of BPH  - Monitor renal function & urine output   - Renally dose meds, avoid nephrotoxins   - Not a HD candidate 2/2 gastric CA  - Continue HCO3 po qid   - F/U Nephro recommendations   - Monitor U/O and BUN/Cr    # Normocytic Anemia: Follow up w/ iron study  - Cont. Ferrous sulfate 325mg BID  - H/H stable; trend CBC daily   - Keep Hgb > 7, transfuse prn   - Hematuria resolving   - Fecal occult

## 2018-12-22 NOTE — PHYSICAL THERAPY INITIAL EVALUATION ADULT - PRECAUTIONS/LIMITATIONS, REHAB EVAL
HISTORY AND RESULTS CONTINUED: CT abdomen/pelvis: large pericardial effusion and small L leural effusion. B/l hydroureteronephrosis, severe on L and moderate on R, to level of urinary bladder. Small bladder calculi are noted in region of the ureterovesical junction on the L. Marked urinary bladder wall thickening and perivesicular stranding may be related to chronic outlet obstruction from an enlarged prostate. Correlate with urinalysis to evaluate for cystitis. Underlying bladder neoplasm cannot be excluded. Thickening of the gastric wall in the region of the fundus likely corresponds with patient's known gastric cancer. In addition, there is gastro- hepatic ligament adenopathy./no known precautions/limitations

## 2018-12-22 NOTE — PROGRESS NOTE ADULT - SUBJECTIVE AND OBJECTIVE BOX
Patient is a 85y old  Male who presents with a chief complaint of Worsening creatinine with suprapubic fullness (21 Dec 2018 23:16)      INTERVAL HPI/OVERNIGHT EVENTS:    MEDICATIONS  (STANDING):  ferrous    sulfate 325 milliGRAM(s) Oral two times a day  influenza   Vaccine 0.5 milliLiter(s) IntraMuscular once  levothyroxine 75 MICROGram(s) Oral daily  sodium bicarbonate 650 milliGRAM(s) Oral four times a day  tamsulosin 0.4 milliGRAM(s) Oral at bedtime    MEDICATIONS  (PRN):  acetaminophen   Tablet .. 650 milliGRAM(s) Oral every 6 hours PRN Mild Pain (1 - 3)      Allergies    No Known Allergies    Intolerances        Vital Signs Last 24 Hrs  T(C): 36.6 (22 Dec 2018 04:00), Max: 37.1 (21 Dec 2018 12:00)  T(F): 97.8 (22 Dec 2018 04:00), Max: 98.8 (21 Dec 2018 12:00)  HR: 70 (22 Dec 2018 06:00) (70 - 106)  BP: 131/67 (22 Dec 2018 06:00) (89/53 - 142/55)  BP(mean): 84 (22 Dec 2018 06:00) (59 - 97)  RR: 15 (22 Dec 2018 06:00) (13 - 23)  SpO2: 98% (22 Dec 2018 06:00) (95% - 100%)    LABS:                        8.6    8.5   )-----------( 244      ( 22 Dec 2018 05:48 )             25.0     12-22    139  |  105  |  92<H>  ----------------------------<  100<H>  4.5   |  20<L>  |  6.70<H>    Ca    9.3      22 Dec 2018 05:48  Phos  3.2     12-22  Mg     2.4     12-22    TPro  6.1  /  Alb  3.1<L>  /  TBili  0.2  /  DBili  x   /  AST  9<L>  /  ALT  8<L>  /  AlkPhos  86  12-22          RADIOLOGY & ADDITIONAL TESTS:        Dr Lewis [phone]

## 2018-12-22 NOTE — PHYSICAL THERAPY INITIAL EVALUATION ADULT - ASSISTIVE DEVICE FOR TRANSFER: SIT/STAND, REHAB EVAL
"CLINICAL NUTRITION SERVICES - PEDIATRIC ASSESSMENT NOTE     REASON FOR ASSESSMENT  Mukesh Wolff is a 33 year old male seen by the dietitian for consult regarding PKU.     ANTHROPOMETRICS  Height: 188 cm  Weight: 109.3 kg  BMI: 31.2  Ideal body weight: 86 kg     NUTRITION HISTORY  Patient follows a low protein diet (12 grams/day or 600 mg PHE).     Patient is returning to clinic for yearly follow-up.  He describes a usual intake as:     Breakfast - typically chips, juice  Snack - usually goes out to lunch at [employer] or [employer], will get a salad, veggie sandwich/sub, or fries or tater tots  Lunch - stir landers, white rice, or if he goes out to eat a \"side dish\" from a restaurant  Dinner - similar to lunch, mostly cooked vegetables and sometimes rice, lots of salads  Snacks - does not typically snack      Last recalled protein goal per patient was ~12 grams/day.  He does consume starches (rice, chips) probably more than he should.  He is taking his formula around meal times.  He does not have any major questions/concerns today.     PKU FORMULA  4 Glytactin RTD 15 per day     This is providing 800 kcals/day (9 kcal/kg), 60 grams protein (0.7 g/kg/day; total protein ~0.8 g/kg/day), 1000 IU Vitamin D, 1400 mg calcium, and 18 mg iron.  Likes this formula after switching to it 1-2 years ago.      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     INTERVENTIONS  Nutrition Prescription  Meet 100% estimated kcal, protein, PHE, vitamin/mineral needs " through low protein diet + PKU formula.    Nutrition Education:   Provided education on continuing current diet of low protein (12 grams/day) + 4 Glytactin RTD per day.    Reviewed intake, PHE levels, and weight changes.  Continue current goals for nutrition; goal of sending more frequent PHE levels for better monitoring of every 1-3 months.  Encouraged compliance of Kuvan daily, 4 Glytactin RTD daily, and logging/tracking protein intake as needed.  Reviewed resources for low protein recipes/food ideas.  Patient with some questions on upcoming treatments; reviewed status and other sources for information.    Goals  1. Gradual weight loss  2. Meet >85% estimated nutrition needs through low protein diet + PKU Formula  3. Reduce PHE levels    FOLLOW UP/MONITORING  Energy Intake  Macronutrient intake  Anthropometric measurements     Time spent with patient: 15 minutes   rolling walker

## 2018-12-22 NOTE — PROGRESS NOTE ADULT - SUBJECTIVE AND OBJECTIVE BOX
[facility]   [phone]  RENAL FOLLOW UP NOTE  --------------------------------------------------------------------------------  Chief Complaint:    24 hour events/subjective:    seen in [facility]   states did not sleep well last pm     PAST HISTORY  --------------------------------------------------------------------------------  No significant changes to PMH, PSH, FHx, SHx, unless otherwise noted    ALLERGIES & MEDICATIONS  --------------------------------------------------------------------------------  Allergies    No Known Allergies    Intolerances      Standing Inpatient Medications  ferrous    sulfate 325 milliGRAM(s) Oral two times a day  influenza   Vaccine 0.5 milliLiter(s) IntraMuscular once  iron sucrose IVPB 100 milliGRAM(s) IV Intermittent every 24 hours  levothyroxine 75 MICROGram(s) Oral daily  sodium bicarbonate 650 milliGRAM(s) Oral four times a day  tamsulosin 0.4 milliGRAM(s) Oral at bedtime    PRN Inpatient Medications  acetaminophen   Tablet .. 650 milliGRAM(s) Oral every 6 hours PRN      REVIEW OF SYSTEMS  --------------------------------------------------------------------------------    Gen: denies fevers/chills,  CVS: denies chest pain/palpitations  Resp: denies SOB/Cough  GI: Denies N/V/Abd pain  : Denies dysuria    All other systems were reviewed and are negative, except as noted.    VITALS/PHYSICAL EXAM  --------------------------------------------------------------------------------  T(C): 37.3 (12-22-18 @ 10:00), Max: 37.3 (12-22-18 @ 10:00)  HR: 81 (12-22-18 @ 12:00) (70 - 90)  BP: 104/50 (12-22-18 @ 12:00) (100/48 - 142/55)  RR: 15 (12-22-18 @ 12:00) (13 - 23)  SpO2: 98% (12-22-18 @ 12:00) (95% - 100%)  Wt(kg): --        12-21-18 @ 07:01  -  12-22-18 @ 07:00  --------------------------------------------------------  IN: 770 mL / OUT: 3625 mL / NET: -2855 mL    12-22-18 @ 07:01  -  12-22-18 @ 15:24  --------------------------------------------------------  IN: 180 mL / OUT: 325 mL / NET: -145 mL      Physical Exam:  	  	Gen: alert oriented place pale appearing. speaks limited english   	Pulm: Decreased breath sounds b/l bases. no rales or ronchi or wheezing  	CV: RRR, S1/S2. no rub  	Back: No CVA tenderness  	Abd: +BS, soft, nontender/nondistended  	: No suprapubic tenderness.               Extremity: No cyanosis, no edema no clubbing  		        LABS/STUDIES  --------------------------------------------------------------------------------              8.6    8.5   >-----------<  244      [12-22-18 @ 05:48]              25.0     139  |  105  |  92  ----------------------------<  100      [12-22-18 @ 05:48]  4.5   |  20  |  6.70        Ca     9.3     [12-22-18 @ 05:48]      Mg     2.4     [12-22-18 @ 05:48]      Phos  3.2     [12-22-18 @ 05:48]    TPro  6.1  /  Alb  3.1  /  TBili  0.2  /  DBili  x   /  AST  9   /  ALT  8   /  AlkPhos  86  [12-22-18 @ 05:48]          Creatinine Trend:  SCr 6.70 [12-22 @ 05:48]  SCr 6.99 [12-21 @ 15:20]  SCr 7.09 [12-21 @ 09:10]  SCr 7.04 [12-21 @ 04:21]  SCr 7.92 [12-20 @ 06:37]              Urinalysis - [12-19-18 @ 15:45]      Color Colorless / Appearance Clear / SG 1.012 / pH 6.5      Gluc Negative / Ketone Negative  / Bili Negative / Urobili Negative       Blood Negative / Protein 30 mg/dL / Leuk Est Negative / Nitrite Negative      RBC 5 / WBC 1 / Hyaline 1 / Gran  / Sq Epi  / Non Sq Epi 0 / Bacteria Negative    Urine Creatinine 47      [12-21-18 @ 08:36]  Urine Protein 36      [12-21-18 @ 08:36]    Iron 11, TIBC 157, %sat 7      [12-21-18 @ 17:25]  Ferritin 103      [12-21-18 @ 17:25]  TSH 8.92      [12-20-18 @ 08:18]    C3 Complement 99      [12-20-18 @ 13:26]  C4 Complement 32      [12-20-18 @ 13:26]  Immunofixation Serum:   No Monoclonal Band Identified      [12-20-18 @ 13:26]  SPEP Interpretation: Normal Electrophoresis Pattern      [12-20-18 @ 13:26]

## 2018-12-23 LAB
ALBUMIN SERPL ELPH-MCNC: 2.9 G/DL — LOW (ref 3.3–5)
ALP SERPL-CCNC: 85 U/L — SIGNIFICANT CHANGE UP (ref 40–120)
ALT FLD-CCNC: 8 U/L — LOW (ref 10–45)
ANA TITR SER: NEGATIVE — SIGNIFICANT CHANGE UP
ANION GAP SERPL CALC-SCNC: 14 MMOL/L — SIGNIFICANT CHANGE UP (ref 5–17)
APTT BLD: 28.7 SEC — SIGNIFICANT CHANGE UP (ref 27.5–36.3)
AST SERPL-CCNC: 13 U/L — SIGNIFICANT CHANGE UP (ref 10–40)
BASOPHILS # BLD AUTO: 0 K/UL — SIGNIFICANT CHANGE UP (ref 0–0.2)
BASOPHILS NFR BLD AUTO: 0 % — SIGNIFICANT CHANGE UP (ref 0–2)
BILIRUB SERPL-MCNC: 0.1 MG/DL — LOW (ref 0.2–1.2)
BUN SERPL-MCNC: 89 MG/DL — HIGH (ref 7–23)
CALCIUM SERPL-MCNC: 9 MG/DL — SIGNIFICANT CHANGE UP (ref 8.4–10.5)
CHLORIDE SERPL-SCNC: 104 MMOL/L — SIGNIFICANT CHANGE UP (ref 96–108)
CO2 SERPL-SCNC: 20 MMOL/L — LOW (ref 22–31)
CREAT SERPL-MCNC: 6.14 MG/DL — HIGH (ref 0.5–1.3)
EOSINOPHIL # BLD AUTO: 0.3 K/UL — SIGNIFICANT CHANGE UP (ref 0–0.5)
EOSINOPHIL NFR BLD AUTO: 4.5 % — SIGNIFICANT CHANGE UP (ref 0–6)
GLUCOSE SERPL-MCNC: 96 MG/DL — SIGNIFICANT CHANGE UP (ref 70–99)
HCT VFR BLD CALC: 22.7 % — LOW (ref 39–50)
HGB BLD-MCNC: 7.8 G/DL — LOW (ref 13–17)
INR BLD: 0.97 RATIO — SIGNIFICANT CHANGE UP (ref 0.88–1.16)
LDH SERPL L TO P-CCNC: 114 U/L — SIGNIFICANT CHANGE UP (ref 50–242)
LYMPHOCYTES # BLD AUTO: 1.1 K/UL — SIGNIFICANT CHANGE UP (ref 1–3.3)
LYMPHOCYTES # BLD AUTO: 15.9 % — SIGNIFICANT CHANGE UP (ref 13–44)
MAGNESIUM SERPL-MCNC: 2.1 MG/DL — SIGNIFICANT CHANGE UP (ref 1.6–2.6)
MCHC RBC-ENTMCNC: 31.5 PG — SIGNIFICANT CHANGE UP (ref 27–34)
MCHC RBC-ENTMCNC: 34.2 GM/DL — SIGNIFICANT CHANGE UP (ref 32–36)
MCV RBC AUTO: 91.9 FL — SIGNIFICANT CHANGE UP (ref 80–100)
MONOCYTES # BLD AUTO: 0.8 K/UL — SIGNIFICANT CHANGE UP (ref 0–0.9)
MONOCYTES NFR BLD AUTO: 11 % — SIGNIFICANT CHANGE UP (ref 2–14)
NEUTROPHILS # BLD AUTO: 4.8 K/UL — SIGNIFICANT CHANGE UP (ref 1.8–7.4)
NEUTROPHILS NFR BLD AUTO: 68.6 % — SIGNIFICANT CHANGE UP (ref 43–77)
PHOSPHATE SERPL-MCNC: 3.3 MG/DL — SIGNIFICANT CHANGE UP (ref 2.5–4.5)
PLATELET # BLD AUTO: 246 K/UL — SIGNIFICANT CHANGE UP (ref 150–400)
POTASSIUM SERPL-MCNC: 4.5 MMOL/L — SIGNIFICANT CHANGE UP (ref 3.5–5.3)
POTASSIUM SERPL-SCNC: 4.5 MMOL/L — SIGNIFICANT CHANGE UP (ref 3.5–5.3)
PROT SERPL-MCNC: 5.7 G/DL — LOW (ref 6–8.3)
PROTHROM AB SERPL-ACNC: 11.2 SEC — SIGNIFICANT CHANGE UP (ref 10–12.9)
RBC # BLD: 2.47 M/UL — LOW (ref 4.2–5.8)
RBC # FLD: 12.5 % — SIGNIFICANT CHANGE UP (ref 10.3–14.5)
SODIUM SERPL-SCNC: 138 MMOL/L — SIGNIFICANT CHANGE UP (ref 135–145)
WBC # BLD: 7.1 K/UL — SIGNIFICANT CHANGE UP (ref 3.8–10.5)
WBC # FLD AUTO: 7.1 K/UL — SIGNIFICANT CHANGE UP (ref 3.8–10.5)

## 2018-12-23 PROCEDURE — 93010 ELECTROCARDIOGRAM REPORT: CPT

## 2018-12-23 PROCEDURE — 99232 SBSQ HOSP IP/OBS MODERATE 35: CPT

## 2018-12-23 RX ORDER — ERYTHROPOIETIN 10000 [IU]/ML
10000 INJECTION, SOLUTION INTRAVENOUS; SUBCUTANEOUS
Qty: 0 | Refills: 0 | Status: DISCONTINUED | OUTPATIENT
Start: 2018-12-23 | End: 2018-12-29

## 2018-12-23 RX ADMIN — Medication 325 MILLIGRAM(S): at 17:06

## 2018-12-23 RX ADMIN — IRON SUCROSE 210 MILLIGRAM(S): 20 INJECTION, SOLUTION INTRAVENOUS at 17:01

## 2018-12-23 RX ADMIN — Medication 650 MILLIGRAM(S): at 19:29

## 2018-12-23 RX ADMIN — Medication 650 MILLIGRAM(S): at 15:29

## 2018-12-23 RX ADMIN — Medication 75 MICROGRAM(S): at 05:18

## 2018-12-23 RX ADMIN — Medication 650 MILLIGRAM(S): at 05:18

## 2018-12-23 RX ADMIN — Medication 325 MILLIGRAM(S): at 05:18

## 2018-12-23 RX ADMIN — TAMSULOSIN HYDROCHLORIDE 0.4 MILLIGRAM(S): 0.4 CAPSULE ORAL at 21:08

## 2018-12-23 NOTE — PROGRESS NOTE ADULT - ASSESSMENT
84 y/o M-patient apparently with a history of gastric CA  non chemo treated with STELLA obstructive uropathy with hyperkalemia on recent blood work with pericardial tamponade on echo       1- STELLA   2- pericardial tamponade/effusion  3- obstructive uropathy    s/p pericardiocentesis  cont ayala  cr improving  has good uo.  no hd given cr improving   monitor renal function for improvement  procrit 27170 units tiw   suspect stella in setting of ATN due to decrease cardiac outpt due to pericardial effusion as well as urinary retention likely

## 2018-12-23 NOTE — PROGRESS NOTE ADULT - SUBJECTIVE AND OBJECTIVE BOX
Patient is a 85y old  Male who presents with a chief complaint of Worsening creatinine with suprapubic fullness (23 Dec 2018 07:27)      INTERVAL HPI/OVERNIGHT EVENTS:    MEDICATIONS  (STANDING):  ferrous    sulfate 325 milliGRAM(s) Oral two times a day  influenza   Vaccine 0.5 milliLiter(s) IntraMuscular once  iron sucrose IVPB 100 milliGRAM(s) IV Intermittent every 24 hours  levothyroxine 75 MICROGram(s) Oral daily  sodium bicarbonate 650 milliGRAM(s) Oral four times a day  tamsulosin 0.4 milliGRAM(s) Oral at bedtime    MEDICATIONS  (PRN):  acetaminophen   Tablet .. 650 milliGRAM(s) Oral every 6 hours PRN Mild Pain (1 - 3)      Allergies    No Known Allergies    Intolerances        Vital Signs Last 24 Hrs  T(C): 36.8 (23 Dec 2018 08:00), Max: 37.3 (23 Dec 2018 05:18)  T(F): 98.3 (23 Dec 2018 08:00), Max: 99.1 (23 Dec 2018 05:18)  HR: 95 (23 Dec 2018 10:00) (71 - 104)  BP: 102/48 (23 Dec 2018 10:00) (100/48 - 164/80)  BP(mean): 65 (23 Dec 2018 10:00) (65 - 103)  RR: 19 (23 Dec 2018 10:00) (14 - 28)  SpO2: 98% (23 Dec 2018 10:00) (95% - 100%)    LABS:                        7.8    7.1   )-----------( 246      ( 23 Dec 2018 05:35 )             22.7     12-23    138  |  104  |  89<H>  ----------------------------<  96  4.5   |  20<L>  |  6.14<H>    Ca    9.0      23 Dec 2018 05:35  Phos  3.3     12-23  Mg     2.1     12-23    TPro  5.7<L>  /  Alb  2.9<L>  /  TBili  0.1<L>  /  DBili  x   /  AST  13  /  ALT  8<L>  /  AlkPhos  85  12-23    PT/INR - ( 23 Dec 2018 05:35 )   PT: 11.2 sec;   INR: 0.97 ratio         PTT - ( 23 Dec 2018 05:35 )  PTT:28.7 sec      RADIOLOGY & ADDITIONAL TESTS:        Dr Lewis [phone] Patient is a 85y old  Male who presents with a chief complaint of Worsening creatinine with suprapubic fullness (23 Dec 2018 07:27)      INTERVAL HPI/OVERNIGHT EVENTS:  sinus on tele- pericardial drainage decreased  MEDICATIONS  (STANDING):  ferrous    sulfate 325 milliGRAM(s) Oral two times a day  influenza   Vaccine 0.5 milliLiter(s) IntraMuscular once  iron sucrose IVPB 100 milliGRAM(s) IV Intermittent every 24 hours  levothyroxine 75 MICROGram(s) Oral daily  sodium bicarbonate 650 milliGRAM(s) Oral four times a day  tamsulosin 0.4 milliGRAM(s) Oral at bedtime    MEDICATIONS  (PRN):  acetaminophen   Tablet .. 650 milliGRAM(s) Oral every 6 hours PRN Mild Pain (1 - 3)      Allergies    No Known Allergies    Intolerances        Vital Signs Last 24 Hrs  T(C): 36.8 (23 Dec 2018 08:00), Max: 37.3 (23 Dec 2018 05:18)  T(F): 98.3 (23 Dec 2018 08:00), Max: 99.1 (23 Dec 2018 05:18)  HR: 95 (23 Dec 2018 10:00) (71 - 104)  BP: 102/48 (23 Dec 2018 10:00) (100/48 - 164/80)  BP(mean): 65 (23 Dec 2018 10:00) (65 - 103)  RR: 19 (23 Dec 2018 10:00) (14 - 28)  SpO2: 98% (23 Dec 2018 10:00) (95% - 100%)    LABS:                        7.8    7.1   )-----------( 246      ( 23 Dec 2018 05:35 )             22.7     12-23    138  |  104  |  89<H>  ----------------------------<  96  4.5   |  20<L>  |  6.14<H>    Ca    9.0      23 Dec 2018 05:35  Phos  3.3     12-23  Mg     2.1     12-23    TPro  5.7<L>  /  Alb  2.9<L>  /  TBili  0.1<L>  /  DBili  x   /  AST  13  /  ALT  8<L>  /  AlkPhos  85  12-23    PT/INR - ( 23 Dec 2018 05:35 )   PT: 11.2 sec;   INR: 0.97 ratio         PTT - ( 23 Dec 2018 05:35 )  PTT:28.7 sec      RADIOLOGY & ADDITIONAL TESTS:        Dr Lewis [phone]

## 2018-12-23 NOTE — PROGRESS NOTE ADULT - ASSESSMENT
84 y/o M- with a history of gastric CA with patient consistently, declining operative and standard chemotherapy options with progressive B/L LE edema with progressive azotemia and with suprapubic fullness. Now pericardial drain for tamponade physiology

## 2018-12-23 NOTE — PROGRESS NOTE ADULT - SUBJECTIVE AND OBJECTIVE BOX
[facility]   [phone]  RENAL FOLLOW UP NOTE  --------------------------------------------------------------------------------  Chief Complaint:    24 hour events/subjective:    seen earlier. son at bedside   pt with c/o fatigue     PAST HISTORY  --------------------------------------------------------------------------------  No significant changes to PMH, PSH, FHx, SHx, unless otherwise noted    ALLERGIES & MEDICATIONS  --------------------------------------------------------------------------------  Allergies    No Known Allergies    Intolerances      Standing Inpatient Medications  ferrous    sulfate 325 milliGRAM(s) Oral two times a day  influenza   Vaccine 0.5 milliLiter(s) IntraMuscular once  levothyroxine 75 MICROGram(s) Oral daily  sodium bicarbonate 650 milliGRAM(s) Oral four times a day  tamsulosin 0.4 milliGRAM(s) Oral at bedtime    PRN Inpatient Medications  acetaminophen   Tablet .. 650 milliGRAM(s) Oral every 6 hours PRN      REVIEW OF SYSTEMS  --------------------------------------------------------------------------------    Gen: denies fevers/chills,  CVS: denies chest pain/palpitations  Resp: denies SOB/Cough  GI: Denies N/V/Abd pain  : Denies dysuria/oliguria/hematuria    All other systems were reviewed and are negative, except as noted.    VITALS/PHYSICAL EXAM  --------------------------------------------------------------------------------  T(C): 36.5 (12-23-18 @ 19:00), Max: 37.3 (12-23-18 @ 05:18)  HR: 95 (12-23-18 @ 19:00) (84 - 112)  BP: 111/61 (12-23-18 @ 19:00) (102/48 - 127/56)  RR: 17 (12-23-18 @ 19:00) (13 - 28)  SpO2: 100% (12-23-18 @ 19:00) (96% - 100%)  Wt(kg): --        12-22-18 @ 07:01  -  12-23-18 @ 07:00  --------------------------------------------------------  IN: 640 mL / OUT: 2575 mL / NET: -1935 mL    12-23-18 @ 07:01  -  12-23-18 @ 19:57  --------------------------------------------------------  IN: 280 mL / OUT: 1005 mL / NET: -725 mL      Physical Exam:  	  Gen: alert oriented place pale appearing. speaks limited english   	Pulm: Decreased breath sounds b/l bases. no rales or ronchi or wheezing  	CV: RRR, S1/S2. no rub  	Back: No CVA tenderness  	Abd: +BS, soft, nontender/nondistended  	: No suprapubic tenderness.               Extremity: No cyanosis, no edema no clubbing  			    LABS/STUDIES  --------------------------------------------------------------------------------              7.8    7.1   >-----------<  246      [12-23-18 @ 05:35]              22.7     138  |  104  |  89  ----------------------------<  96      [12-23-18 @ 05:35]  4.5   |  20  |  6.14        Ca     9.0     [12-23-18 @ 05:35]      Mg     2.1     [12-23-18 @ 05:35]      Phos  3.3     [12-23-18 @ 05:35]    TPro  5.7  /  Alb  2.9  /  TBili  0.1  /  DBili  x   /  AST  13  /  ALT  8   /  AlkPhos  85  [12-23-18 @ 05:35]    PT/INR: PT 11.2 , INR 0.97       [12-23-18 @ 05:35]  PTT: 28.7       [12-23-18 @ 05:35]          [12-23-18 @ 05:35]    Creatinine Trend:  SCr 6.14 [12-23 @ 05:35]  SCr 6.70 [12-22 @ 05:48]  SCr 6.99 [12-21 @ 15:20]  SCr 7.09 [12-21 @ 09:10]  SCr 7.04 [12-21 @ 04:21]              Urinalysis - [12-19-18 @ 15:45]      Color Colorless / Appearance Clear / SG 1.012 / pH 6.5      Gluc Negative / Ketone Negative  / Bili Negative / Urobili Negative       Blood Negative / Protein 30 mg/dL / Leuk Est Negative / Nitrite Negative      RBC 5 / WBC 1 / Hyaline 1 / Gran  / Sq Epi  / Non Sq Epi 0 / Bacteria Negative    Urine Creatinine 47      [12-21-18 @ 08:36]  Urine Protein 36      [12-21-18 @ 08:36]    Iron 11, TIBC 157, %sat 7      [12-21-18 @ 17:25]  Ferritin 103      [12-21-18 @ 17:25]  TSH 8.92      [12-20-18 @ 08:18]    DESTINY: titer Negative, pattern --      [12-20-18 @ 13:26]  C3 Complement 99      [12-20-18 @ 13:26]  C4 Complement 32      [12-20-18 @ 13:26]  Immunofixation Serum:   No Monoclonal Band Identified      [12-20-18 @ 13:26]  SPEP Interpretation: Normal Electrophoresis Pattern      [12-20-18 @ 13:26]

## 2018-12-23 NOTE — PROGRESS NOTE ADULT - ATTENDING COMMENTS
Cristobal is resting comfortably in bed at this time.  He reports no pain or dyspnea.  His pericardial drain is in place with an output of 30cc over the prior 24 hours.  His hemodynamics remain stable.  Renal function abnormal but stable and followed by Dr Holman.  I would leave the drain in until tomorrow morning, repeat a limited bedside TTE for effusion assessment, and remove the drain if the drainage is minimal over night and effusion small or less.  Magnus Blanton MD

## 2018-12-23 NOTE — PROGRESS NOTE ADULT - SUBJECTIVE AND OBJECTIVE BOX
Admission date: Dec 19th  CHIEF COMPLAINT:  HPI:  NIGHT HOSPITALIST:   Patient UNKNOWN to me previously, assigned to me at this point via the ER and by Dr. Pulliam to admit this 86 y/o M--patient seen with patient's adult son in attendance--patient interviewed by examiner in patient's native Telugu--son is bilingual and patient/son declined telephone --patient apparently with a history of gastric CA with patient consistently declining operative and standard chemotherapy options, with patient apparently on a mistletoe treatment (?) as a nonstandard approach with a group in [place] (?) following an evaluation in [place]--patient with progressive B/L LE oedema with progressive azotemia and with suprapubic fullness.   Patient with reported history of unspecified prostate disorder and follows Dr. Umaña above.  Apparently patient is now reconsidering treatment by Dr. Pacheco above but referred to the ER due to the progression of Cr.  Patient denies abdominal pain, no red blood per rectum or melena.  No chest pain/pressure.  No dyspnoea.  No back pain, no tearing back pain.   No fever, no chills, no rigors.   No HA, no focal weakness.   Patient with anorexia and unspecified involuntary weight loss.   No rash, no joint pain.   No dysuria, no haematuria.   Remaining review of systems not contributory. (19 Dec 2018 19:07)    INTERVAL HISTORY: Pericardial drain in place drained 20ml overnight for total 30ml/24hrs    REVIEW OF SYSTEMS: Denies ; all others negative    MEDICATIONS  (STANDING):  ferrous    sulfate 325 milliGRAM(s) Oral two times a day  influenza   Vaccine 0.5 milliLiter(s) IntraMuscular once  iron sucrose IVPB 100 milliGRAM(s) IV Intermittent every 24 hours  levothyroxine 75 MICROGram(s) Oral daily  sodium bicarbonate 650 milliGRAM(s) Oral four times a day  tamsulosin 0.4 milliGRAM(s) Oral at bedtime    MEDICATIONS  (PRN):  acetaminophen   Tablet .. 650 milliGRAM(s) Oral every 6 hours PRN Mild Pain (1 - 3)      Objective:  ICU Vital Signs Last 24 Hrs  T(C): 37.3 (23 Dec 2018 05:18), Max: 37.3 (22 Dec 2018 10:00)  T(F): 99.1 (23 Dec 2018 05:18), Max: 99.2 (22 Dec 2018 10:00)  HR: 100 (23 Dec 2018 07:00) (71 - 102)  BP: 121/55 (23 Dec 2018 07:00) (100/48 - 164/80)  BP(mean): 74 (23 Dec 2018 07:00) (58 - 103)  ABP: --  ABP(mean): --  RR: 17 (23 Dec 2018 07:00) (14 - 28)  SpO2: 98% (23 Dec 2018 07:00) (95% - 100%)           @ 07:01  -   @ 07:00  --------------------------------------------------------  IN: 640 mL / OUT: 2575 mL / NET: -1935 mL      Daily     Daily Weight in k.5 (23 Dec 2018 05:52)    PHYSICAL EXAM:  Constitutional:  HEENT:  Respiratory:  Cardiovascular:  Pericardial drain site:  Gastrointestinal:  Genitourinary:  Extremities:  Vascular:  Neurological:  Skin:      TELEMETRY:     EKG:     IMAGING:    Labs:                          7.8    7.1   )-----------( 246      ( 23 Dec 2018 05:35 )             22.7         138  |  104  |  89<H>  ----------------------------<  96  4.5   |  20<L>  |  6.14<H>    Ca    9.0      23 Dec 2018 05:35  Phos  3.3       Mg     2.1         TPro  5.7<L>  /  Alb  2.9<L>  /  TBili  0.1<L>  /  DBili  x   /  AST  13  /  ALT  8<L>  /  AlkPhos  85      LIVER FUNCTIONS - ( 23 Dec 2018 05:35 )  Alb: 2.9 g/dL / Pro: 5.7 g/dL / ALK PHOS: 85 U/L / ALT: 8 U/L / AST: 13 U/L / GGT: x           PT/INR - ( 23 Dec 2018 05:35 )   PT: 11.2 sec;   INR: 0.97 ratio         PTT - ( 23 Dec 2018 05:35 )  PTT:28.7 sec        HEALTH ISSUES - PROBLEM Dx:  Prophylactic measure: Prophylactic measure  Anemia in neoplastic disease: Anemia in neoplastic disease  Malignant neoplasm of stomach, unspecified location: Malignant neoplasm of stomach, unspecified location  Hypertension, unspecified type: Hypertension, unspecified type  Pericardial effusion: Pericardial effusion  Hypothyroidism, unspecified type: Hypothyroidism, unspecified type  Bladder wall thickening: Bladder wall thickening  Stage 5 chronic kidney disease not on chronic dialysis: Stage 5 chronic kidney disease not on chronic dialysis Admission date: Dec 19th  CHIEF COMPLAINT:  HPI:  NIGHT HOSPITALIST:   Patient UNKNOWN to me previously, assigned to me at this point via the ER and by Dr. Pulliam to admit this 84 y/o M--patient seen with patient's adult son in attendance--patient interviewed by examiner in patient's native Arabic--son is bilingual and patient/son declined telephone --patient apparently with a history of gastric CA with patient consistently declining operative and standard chemotherapy options, with patient apparently on a mistletoe treatment (?) as a nonstandard approach with a group in [place] (?) following an evaluation in [place]--patient with progressive B/L LE oedema with progressive azotemia and with suprapubic fullness.   Patient with reported history of unspecified prostate disorder and follows Dr. Umaña above.  Apparently patient is now reconsidering treatment by Dr. Pacheco above but referred to the ER due to the progression of Cr.  Patient denies abdominal pain, no red blood per rectum or melena.  No chest pain/pressure.  No dyspnoea.  No back pain, no tearing back pain.   No fever, no chills, no rigors.   No HA, no focal weakness.   Patient with anorexia and unspecified involuntary weight loss.   No rash, no joint pain.   No dysuria, no haematuria.   Remaining review of systems not contributory. (19 Dec 2018 19:07)    INTERVAL HISTORY: Pericardial drain in place drained 20ml overnight for total 30ml/24hrs  Resting comfortably in bed, sleeping intervals; no complaints offered    REVIEW OF SYSTEMS: Denies chest pain, SOB, dizziness, abd pain, NV; all others negative    MEDICATIONS  (STANDING):  ferrous    sulfate 325 milliGRAM(s) Oral two times a day  influenza   Vaccine 0.5 milliLiter(s) IntraMuscular once  iron sucrose IVPB 100 milliGRAM(s) IV Intermittent every 24 hours  levothyroxine 75 MICROGram(s) Oral daily  sodium bicarbonate 650 milliGRAM(s) Oral four times a day  tamsulosin 0.4 milliGRAM(s) Oral at bedtime    MEDICATIONS  (PRN):  acetaminophen   Tablet .. 650 milliGRAM(s) Oral every 6 hours PRN Mild Pain (1 - 3)      Objective:  ICU Vital Signs Last 24 Hrs  T(C): 37.3 (23 Dec 2018 05:18), Max: 37.3 (22 Dec 2018 10:00)  T(F): 99.1 (23 Dec 2018 05:18), Max: 99.2 (22 Dec 2018 10:00)  HR: 100 (23 Dec 2018 07:00) (71 - 102)  BP: 121/55 (23 Dec 2018 07:00) (100/48 - 164/80)  BP(mean): 74 (23 Dec 2018 07:00) (58 - 103)  ABP: --  ABP(mean): --  RR: 17 (23 Dec 2018 07:00) (14 - 28)  SpO2: 98% (23 Dec 2018 07:00) (95% - 100%)       @ 07:01  -   @ 07:00  --------------------------------------------------------  IN: 640 mL / OUT: 2575 mL / NET: -1935 mL      Daily     Daily Weight in k.5 (23 Dec 2018 05:52)    PHYSICAL EXAM:  Constitutional: NAD  HEENT: oral mucosa pink moist  Respiratory: Regular unlabored, CTA decreased bilat bases  Cardiovascular: RRR S1 S2 no M/R/G  Pericardial drain site: lower chest mid thoracic drain in place site ok no erythema; draining yellow fluid  Gastrointestinal: soft ND/NT; + bowel sounds  Genitourinary: urine cath in place draining blood tinged urine  Extremities: MUÑOZ equal strength; sequential compression stockings on  Vascular: warm peripherally   Neurological: A & O x 3 mood & affect appropriate  Skin: warm dry intact no rash/cyanosis      TELEMETRY: SR - ST no events    EKG: ST w/ 1 degree AB block ; NATHANAEL 222; QRS 80; Qtc 427    IMAGING:    Labs:                          7.8    7.1   )-----------( 246      ( 23 Dec 2018 05:35 )             22.7     12    138  |  104  |  89<H>  ----------------------------<  96  4.5   |  20<L>  |  6.14<H>    Ca    9.0      23 Dec 2018 05:35  Phos  3.3       Mg     2.1         TPro  5.7<L>  /  Alb  2.9<L>  /  TBili  0.1<L>  /  DBili  x   /  AST  13  /  ALT  8<L>  /  AlkPhos  85      LIVER FUNCTIONS - ( 23 Dec 2018 05:35 )  Alb: 2.9 g/dL / Pro: 5.7 g/dL / ALK PHOS: 85 U/L / ALT: 8 U/L / AST: 13 U/L / GGT: x           PT/INR - ( 23 Dec 2018 05:35 )   PT: 11.2 sec;   INR: 0.97 ratio         PTT - ( 23 Dec 2018 05:35 )  PTT:28.7 sec        HEALTH ISSUES - PROBLEM Dx:  Prophylactic measure: Prophylactic measure  Anemia in neoplastic disease: Anemia in neoplastic disease  Malignant neoplasm of stomach, unspecified location: Malignant neoplasm of stomach, unspecified location  Hypertension, unspecified type: Hypertension, unspecified type  Pericardial effusion: Pericardial effusion  Hypothyroidism, unspecified type: Hypothyroidism, unspecified type  Bladder wall thickening: Bladder wall thickening  Stage 5 chronic kidney disease not on chronic dialysis: Stage 5 chronic kidney disease not on chronic dialysis

## 2018-12-23 NOTE — PROGRESS NOTE ADULT - PROBLEM SELECTOR PLAN 3
Renal following  Creat slowly downtrending to 6.14 today  Monitor I & O   Negative 1.9 liter   Avoid nephrotoxic meds

## 2018-12-23 NOTE — PROGRESS NOTE ADULT - ASSESSMENT
pericardial effusion-gastric carcinoma- -nonstandard treatment- dysphagia pericardial effusion-gastric carcinoma- -nonstandard treatment- dysphagia-improved-eating-anemia pericardial effusion-gastric carcinoma- -nonstandard treatment- dysphagia-improved-eating-anemia-renal failure- slowly improving-followup with renal-  follow up pericardial  fluid analysis

## 2018-12-23 NOTE — PROGRESS NOTE ADULT - PROBLEM SELECTOR PLAN 5
h/h monitor  Iron studies  c/w ferrous sulfate h/h monitor  Iron studies  c/w ferrous sulfate; IV Venofer

## 2018-12-23 NOTE — PROGRESS NOTE ADULT - ASSESSMENT
86 yo man with  history of gastric CA  has never tx'd with chemotherapy and  was sent to ER for staging. CT showed large pericardial effusion. Pt is now in PICU with a drain in chest draining pericardial fluid.    12-21-18: R/O iron deficiency: check ferritin, b12/ folate    12-22-18: received Venofer this morning. Persistent odynophagia. await pathology of pericardial fluid.    12-23-18: No more odynophagia.. HCT decreased. Repeat Ferritin. Check B12/ Folate. If Ferritin is adequate, Pt will be given Procrit.

## 2018-12-23 NOTE — PROGRESS NOTE ADULT - SUBJECTIVE AND OBJECTIVE BOX
Patient is a 85y old  Male who presents with a chief complaint of Worsening creatinine with suprapubic fullness (23 Dec 2018 19:56)       Pt is seen and examined  pt is awake and lying in bed  pt seems comfortable and denies any complaints at this time    MEDICATIONS  (STANDING):  epoetin marimar Injectable 15596 Unit(s) IV Push <User Schedule>  ferrous    sulfate 325 milliGRAM(s) Oral two times a day  influenza   Vaccine 0.5 milliLiter(s) IntraMuscular once  levothyroxine 75 MICROGram(s) Oral daily  sodium bicarbonate 650 milliGRAM(s) Oral four times a day  tamsulosin 0.4 milliGRAM(s) Oral at bedtime    MEDICATIONS  (PRN):  acetaminophen   Tablet .. 650 milliGRAM(s) Oral every 6 hours PRN Mild Pain (1 - 3)      Allergies    No Known Allergies    Intolerances        Vital Signs Last 24 Hrs  T(C): 36.5 (23 Dec 2018 19:00), Max: 37.3 (23 Dec 2018 05:18)  T(F): 97.7 (23 Dec 2018 19:00), Max: 99.2 (23 Dec 2018 12:00)  HR: 97 (23 Dec 2018 21:00) (87 - 112)  BP: 125/55 (23 Dec 2018 21:00) (102/48 - 127/56)  BP(mean): 73 (23 Dec 2018 21:00) (65 - 86)  RR: 22 (23 Dec 2018 21:00) (13 - 22)  SpO2: 99% (23 Dec 2018 21:00) (96% - 100%)        LABS:                          7.8    7.1   )-----------( 246      ( 23 Dec 2018 05:35 )             22.7         Mean Cell Volume : 91.9 fl  Mean Cell Hemoglobin : 31.5 pg  Mean Cell Hemoglobin Concentration : 34.2 gm/dL  Auto Neutrophil # : 4.8 K/uL  Auto Lymphocyte # : 1.1 K/uL  Auto Monocyte # : 0.8 K/uL  Auto Eosinophil # : 0.3 K/uL  Auto Basophil # : 0.0 K/uL  Auto Neutrophil % : 68.6 %  Auto Lymphocyte % : 15.9 %  Auto Monocyte % : 11.0 %  Auto Eosinophil % : 4.5 %  Auto Basophil % : 0.0 %    Serial CBC's  12-23 @ 05:35  Hct-22.7 / Hgb-7.8 / Plat-246 / RBC-2.47 / WBC-7.1          Serial CBC's  12-22 @ 05:48  Hct-25.0 / Hgb-8.6 / Plat-244 / RBC-2.73 / WBC-8.5          Serial CBC's  12-21 @ 15:18  Hct-21.7 / Hgb-7.4 / Plat-205 / RBC-2.36 / WBC-9.0          Serial CBC's  12-21 @ 09:10  Hct-23.5 / Hgb-7.6 / Plat-212 / RBC-2.59 / WBC-8.6          Serial CBC's  12-21 @ 04:21  Hct-21.5 / Hgb-7.7 / Plat-203 / RBC-2.39 / WBC-8.4            12-23    138  |  104  |  89<H>  ----------------------------<  96  4.5   |  20<L>  |  6.14<H>    Ca    9.0      23 Dec 2018 05:35  Phos  3.3     12-23  Mg     2.1     12-23    TPro  5.7<L>  /  Alb  2.9<L>  /  TBili  0.1<L>  /  DBili  x   /  AST  13  /  ALT  8<L>  /  AlkPhos  85  12-23      PT/INR - ( 23 Dec 2018 05:35 )   PT: 11.2 sec;   INR: 0.97 ratio         PTT - ( 23 Dec 2018 05:35 )  PTT:28.7 sec    WBC Count: 7.1 K/uL (12-23-18 @ 05:35)  Hemoglobin: 7.8 g/dL (12-23-18 @ 05:35)  Hematocrit: 22.7 % (12-23-18 @ 05:35)  Platelet Count - Automated: 246 K/uL (12-23-18 @ 05:35)  WBC Count: 8.5 K/uL (12-22-18 @ 05:48)  Hemoglobin: 8.6 g/dL (12-22-18 @ 05:48)  Hematocrit: 25.0 % (12-22-18 @ 05:48)  Platelet Count - Automated: 244 K/uL (12-22-18 @ 05:48)  Ferritin, Serum: 103 ng/mL (12-21-18 @ 17:25)  Iron - Total Binding Capacity.: 157 ug/dL (12-21-18 @ 17:25)  WBC Count: 9.0 K/uL (12-21-18 @ 15:18)  Hemoglobin: 7.4 g/dL (12-21-18 @ 15:18)  Hematocrit: 21.7 % (12-21-18 @ 15:18)  Platelet Count - Automated: 205 K/uL (12-21-18 @ 15:18)  WBC Count: 8.6 K/uL (12-21-18 @ 09:10)  Hemoglobin: 7.6 g/dL (12-21-18 @ 09:10)  Hematocrit: 23.5 % (12-21-18 @ 09:10)  Platelet Count - Automated: 212 K/uL (12-21-18 @ 09:10)  WBC Count: 8.4 K/uL (12-21-18 @ 04:21)  Hemoglobin: 7.7 g/dL (12-21-18 @ 04:21)  Hematocrit: 21.5 % (12-21-18 @ 04:21)  Platelet Count - Automated: 203 K/uL (12-21-18 @ 04:21)  WBC Count: 8.19 K/uL (12-20-18 @ 08:14)  Hemoglobin: 8.3 g/dL (12-20-18 @ 08:14)  Hematocrit: 25.9 % (12-20-18 @ 08:14)  Platelet Count - Automated: 198 K/uL (12-20-18 @ 08:14)  Hemoglobin: 8.5 g/dL (12-19-18 @ 15:21)  Platelet Count - Automated: 224 K/uL (12-19-18 @ 15:21)  Hematocrit: 24.0 % (12-19-18 @ 15:21)  WBC Count: 8.1 K/uL (12-19-18 @ 15:21)      Serum Protein Electrophoresis Interp: Normal Electrophoresis Pattern (12-20 @ 13:26)  Immunofixation, Serum:   No Monoclonal Band Identified (12-20 @ 13:26)

## 2018-12-24 DIAGNOSIS — Z71.89 OTHER SPECIFIED COUNSELING: ICD-10-CM

## 2018-12-24 DIAGNOSIS — Z51.5 ENCOUNTER FOR PALLIATIVE CARE: ICD-10-CM

## 2018-12-24 DIAGNOSIS — I31.4 CARDIAC TAMPONADE: ICD-10-CM

## 2018-12-24 DIAGNOSIS — N17.9 ACUTE KIDNEY FAILURE, UNSPECIFIED: ICD-10-CM

## 2018-12-24 LAB
ALBUMIN SERPL ELPH-MCNC: 3 G/DL — LOW (ref 3.3–5)
ALP SERPL-CCNC: 83 U/L — SIGNIFICANT CHANGE UP (ref 40–120)
ALT FLD-CCNC: 8 U/L — LOW (ref 10–45)
ANION GAP SERPL CALC-SCNC: 15 MMOL/L — SIGNIFICANT CHANGE UP (ref 5–17)
APTT BLD: 30.4 SEC — SIGNIFICANT CHANGE UP (ref 27.5–36.3)
AST SERPL-CCNC: 11 U/L — SIGNIFICANT CHANGE UP (ref 10–40)
BASOPHILS # BLD AUTO: 0 K/UL — SIGNIFICANT CHANGE UP (ref 0–0.2)
BASOPHILS NFR BLD AUTO: 0.3 % — SIGNIFICANT CHANGE UP (ref 0–2)
BILIRUB SERPL-MCNC: 0.2 MG/DL — SIGNIFICANT CHANGE UP (ref 0.2–1.2)
BUN SERPL-MCNC: 81 MG/DL — HIGH (ref 7–23)
CALCIUM SERPL-MCNC: 8.9 MG/DL — SIGNIFICANT CHANGE UP (ref 8.4–10.5)
CHLORIDE SERPL-SCNC: 102 MMOL/L — SIGNIFICANT CHANGE UP (ref 96–108)
CO2 SERPL-SCNC: 21 MMOL/L — LOW (ref 22–31)
CREAT ?TM UR-MCNC: 38 MG/DL — SIGNIFICANT CHANGE UP
CREAT SERPL-MCNC: 5.4 MG/DL — HIGH (ref 0.5–1.3)
EOSINOPHIL # BLD AUTO: 0.3 K/UL — SIGNIFICANT CHANGE UP (ref 0–0.5)
EOSINOPHIL NFR BLD AUTO: 4.2 % — SIGNIFICANT CHANGE UP (ref 0–6)
FERRITIN SERPL-MCNC: 223 NG/ML — SIGNIFICANT CHANGE UP (ref 30–400)
FOLATE SERPL-MCNC: 5.2 NG/ML — SIGNIFICANT CHANGE UP
GLUCOSE SERPL-MCNC: 116 MG/DL — HIGH (ref 70–99)
HCT VFR BLD CALC: 23.1 % — LOW (ref 39–50)
HGB BLD-MCNC: 7.8 G/DL — LOW (ref 13–17)
INR BLD: 1.05 RATIO — SIGNIFICANT CHANGE UP (ref 0.88–1.16)
LYMPHOCYTES # BLD AUTO: 1.4 K/UL — SIGNIFICANT CHANGE UP (ref 1–3.3)
LYMPHOCYTES # BLD AUTO: 17.9 % — SIGNIFICANT CHANGE UP (ref 13–44)
MAGNESIUM SERPL-MCNC: 1.9 MG/DL — SIGNIFICANT CHANGE UP (ref 1.6–2.6)
MCHC RBC-ENTMCNC: 30.9 PG — SIGNIFICANT CHANGE UP (ref 27–34)
MCHC RBC-ENTMCNC: 33.9 GM/DL — SIGNIFICANT CHANGE UP (ref 32–36)
MCV RBC AUTO: 91 FL — SIGNIFICANT CHANGE UP (ref 80–100)
MONOCYTES # BLD AUTO: 0.7 K/UL — SIGNIFICANT CHANGE UP (ref 0–0.9)
MONOCYTES NFR BLD AUTO: 9.6 % — SIGNIFICANT CHANGE UP (ref 2–14)
NEUTROPHILS # BLD AUTO: 5.2 K/UL — SIGNIFICANT CHANGE UP (ref 1.8–7.4)
NEUTROPHILS NFR BLD AUTO: 68 % — SIGNIFICANT CHANGE UP (ref 43–77)
PHOSPHATE SERPL-MCNC: 2.5 MG/DL — SIGNIFICANT CHANGE UP (ref 2.5–4.5)
PLATELET # BLD AUTO: 251 K/UL — SIGNIFICANT CHANGE UP (ref 150–400)
POTASSIUM SERPL-MCNC: 4.6 MMOL/L — SIGNIFICANT CHANGE UP (ref 3.5–5.3)
POTASSIUM SERPL-SCNC: 4.6 MMOL/L — SIGNIFICANT CHANGE UP (ref 3.5–5.3)
PROT ?TM UR-MCNC: 64 MG/DL — HIGH (ref 0–12)
PROT SERPL-MCNC: 5.7 G/DL — LOW (ref 6–8.3)
PROT/CREAT UR-RTO: 1.7 RATIO — HIGH (ref 0–0.2)
PROTHROM AB SERPL-ACNC: 12 SEC — SIGNIFICANT CHANGE UP (ref 10–12.9)
RBC # BLD: 2.54 M/UL — LOW (ref 4.2–5.8)
RBC # FLD: 12.4 % — SIGNIFICANT CHANGE UP (ref 10.3–14.5)
SODIUM SERPL-SCNC: 138 MMOL/L — SIGNIFICANT CHANGE UP (ref 135–145)
VIT B12 SERPL-MCNC: 887 PG/ML — SIGNIFICANT CHANGE UP (ref 232–1245)
WBC # BLD: 7.6 K/UL — SIGNIFICANT CHANGE UP (ref 3.8–10.5)
WBC # FLD AUTO: 7.6 K/UL — SIGNIFICANT CHANGE UP (ref 3.8–10.5)

## 2018-12-24 PROCEDURE — 93308 TTE F-UP OR LMTD: CPT | Mod: 26

## 2018-12-24 PROCEDURE — 99497 ADVNCD CARE PLAN 30 MIN: CPT | Mod: 25

## 2018-12-24 PROCEDURE — 93321 DOPPLER ECHO F-UP/LMTD STD: CPT | Mod: 26

## 2018-12-24 PROCEDURE — 71045 X-RAY EXAM CHEST 1 VIEW: CPT | Mod: 26

## 2018-12-24 PROCEDURE — 99223 1ST HOSP IP/OBS HIGH 75: CPT

## 2018-12-24 PROCEDURE — 99233 SBSQ HOSP IP/OBS HIGH 50: CPT | Mod: GC

## 2018-12-24 PROCEDURE — 93010 ELECTROCARDIOGRAM REPORT: CPT

## 2018-12-24 RX ORDER — MAGNESIUM SULFATE 500 MG/ML
1 VIAL (ML) INJECTION ONCE
Qty: 0 | Refills: 0 | Status: COMPLETED | OUTPATIENT
Start: 2018-12-24 | End: 2018-12-24

## 2018-12-24 RX ORDER — PANTOPRAZOLE SODIUM 20 MG/1
40 TABLET, DELAYED RELEASE ORAL
Qty: 0 | Refills: 0 | Status: DISCONTINUED | OUTPATIENT
Start: 2018-12-24 | End: 2018-12-27

## 2018-12-24 RX ORDER — FOLIC ACID 0.8 MG
1 TABLET ORAL DAILY
Qty: 0 | Refills: 0 | Status: DISCONTINUED | OUTPATIENT
Start: 2018-12-24 | End: 2018-12-29

## 2018-12-24 RX ADMIN — Medication 650 MILLIGRAM(S): at 06:31

## 2018-12-24 RX ADMIN — TAMSULOSIN HYDROCHLORIDE 0.4 MILLIGRAM(S): 0.4 CAPSULE ORAL at 21:08

## 2018-12-24 RX ADMIN — Medication 100 GRAM(S): at 05:17

## 2018-12-24 RX ADMIN — Medication 325 MILLIGRAM(S): at 06:31

## 2018-12-24 RX ADMIN — Medication 1 MILLIGRAM(S): at 21:08

## 2018-12-24 RX ADMIN — Medication 650 MILLIGRAM(S): at 23:06

## 2018-12-24 RX ADMIN — Medication 650 MILLIGRAM(S): at 01:40

## 2018-12-24 RX ADMIN — Medication 650 MILLIGRAM(S): at 12:47

## 2018-12-24 RX ADMIN — Medication 650 MILLIGRAM(S): at 17:42

## 2018-12-24 RX ADMIN — Medication 75 MICROGRAM(S): at 06:31

## 2018-12-24 RX ADMIN — Medication 30 MILLILITER(S): at 20:24

## 2018-12-24 RX ADMIN — Medication 325 MILLIGRAM(S): at 17:42

## 2018-12-24 RX ADMIN — ERYTHROPOIETIN 10000 UNIT(S): 10000 INJECTION, SOLUTION INTRAVENOUS; SUBCUTANEOUS at 06:32

## 2018-12-24 NOTE — PROGRESS NOTE ADULT - SUBJECTIVE AND OBJECTIVE BOX
Patient is a 85y old  Male who presents with a chief complaint of Worsening creatinine with suprapubic fullness (24 Dec 2018 15:22)       Pt is seen and examined  pt is awake and lying in bed  pt seems comfortable     MEDICATIONS  (STANDING):  epoetin marimar Injectable 84200 Unit(s) IV Push <User Schedule>  ferrous    sulfate 325 milliGRAM(s) Oral two times a day  folic acid 1 milliGRAM(s) Oral daily  influenza   Vaccine 0.5 milliLiter(s) IntraMuscular once  levothyroxine 75 MICROGram(s) Oral daily  sodium bicarbonate 650 milliGRAM(s) Oral four times a day  tamsulosin 0.4 milliGRAM(s) Oral at bedtime    MEDICATIONS  (PRN):  acetaminophen   Tablet .. 650 milliGRAM(s) Oral every 6 hours PRN Mild Pain (1 - 3)      Allergies    No Known Allergies    Intolerances        Vital Signs Last 24 Hrs  T(C): 36.9 (24 Dec 2018 03:00), Max: 37.2 (23 Dec 2018 23:00)  T(F): 98.4 (24 Dec 2018 03:00), Max: 98.9 (23 Dec 2018 23:00)  HR: 87 (24 Dec 2018 17:00) (82 - 102)  BP: 116/55 (24 Dec 2018 17:00) (99/39 - 125/55)  BP(mean): 71 (24 Dec 2018 17:00) (59 - 76)  RR: 22 (24 Dec 2018 17:00) (13 - 22)  SpO2: 97% (24 Dec 2018 16:00) (97% - 100%)        LABS:                          7.8    7.6   )-----------( 251      ( 24 Dec 2018 04:29 )             23.1         Mean Cell Volume : 91.0 fl  Mean Cell Hemoglobin : 30.9 pg  Mean Cell Hemoglobin Concentration : 33.9 gm/dL  Auto Neutrophil # : 5.2 K/uL  Auto Lymphocyte # : 1.4 K/uL  Auto Monocyte # : 0.7 K/uL  Auto Eosinophil # : 0.3 K/uL  Auto Basophil # : 0.0 K/uL  Auto Neutrophil % : 68.0 %  Auto Lymphocyte % : 17.9 %  Auto Monocyte % : 9.6 %  Auto Eosinophil % : 4.2 %  Auto Basophil % : 0.3 %    Serial CBC's  12-24 @ 04:29  Hct-23.1 / Hgb-7.8 / Plat-251 / RBC-2.54 / WBC-7.6          Serial CBC's  12-23 @ 05:35  Hct-22.7 / Hgb-7.8 / Plat-246 / RBC-2.47 / WBC-7.1          Serial CBC's  12-22 @ 05:48  Hct-25.0 / Hgb-8.6 / Plat-244 / RBC-2.73 / WBC-8.5          Serial CBC's  12-21 @ 15:18  Hct-21.7 / Hgb-7.4 / Plat-205 / RBC-2.36 / WBC-9.0          Serial CBC's  12-21 @ 09:10  Hct-23.5 / Hgb-7.6 / Plat-212 / RBC-2.59 / WBC-8.6            12-24    138  |  102  |  81<H>  ----------------------------<  116<H>  4.6   |  21<L>  |  5.40<H>    Ca    8.9      24 Dec 2018 04:29  Phos  2.5     12-24  Mg     1.9     12-24    TPro  5.7<L>  /  Alb  3.0<L>  /  TBili  0.2  /  DBili  x   /  AST  11  /  ALT  8<L>  /  AlkPhos  83  12-24      PT/INR - ( 24 Dec 2018 04:29 )   PT: 12.0 sec;   INR: 1.05 ratio         PTT - ( 24 Dec 2018 04:29 )  PTT:30.4 sec    Ferritin, Serum: 223 ng/mL (12-24-18 @ 08:23)  Folate, Serum: 5.2 ng/mL (12-24-18 @ 08:23)  Vitamin B12, Serum: 887 pg/mL (12-24-18 @ 08:23)  WBC Count: 7.6 K/uL (12-24-18 @ 04:29)  Hemoglobin: 7.8 g/dL (12-24-18 @ 04:29)  Hematocrit: 23.1 % (12-24-18 @ 04:29)  Platelet Count - Automated: 251 K/uL (12-24-18 @ 04:29)  WBC Count: 7.1 K/uL (12-23-18 @ 05:35)  Hemoglobin: 7.8 g/dL (12-23-18 @ 05:35)  Hematocrit: 22.7 % (12-23-18 @ 05:35)  Platelet Count - Automated: 246 K/uL (12-23-18 @ 05:35)  WBC Count: 8.5 K/uL (12-22-18 @ 05:48)  Hemoglobin: 8.6 g/dL (12-22-18 @ 05:48)  Hematocrit: 25.0 % (12-22-18 @ 05:48)  Platelet Count - Automated: 244 K/uL (12-22-18 @ 05:48)  Ferritin, Serum: 103 ng/mL (12-21-18 @ 17:25)  Iron - Total Binding Capacity.: 157 ug/dL (12-21-18 @ 17:25)  WBC Count: 9.0 K/uL (12-21-18 @ 15:18)  Hemoglobin: 7.4 g/dL (12-21-18 @ 15:18)  Hematocrit: 21.7 % (12-21-18 @ 15:18)  Platelet Count - Automated: 205 K/uL (12-21-18 @ 15:18)  WBC Count: 8.6 K/uL (12-21-18 @ 09:10)  Hemoglobin: 7.6 g/dL (12-21-18 @ 09:10)  Hematocrit: 23.5 % (12-21-18 @ 09:10)  Platelet Count - Automated: 212 K/uL (12-21-18 @ 09:10)  WBC Count: 8.4 K/uL (12-21-18 @ 04:21)  Hemoglobin: 7.7 g/dL (12-21-18 @ 04:21)  Hematocrit: 21.5 % (12-21-18 @ 04:21)  Platelet Count - Automated: 203 K/uL (12-21-18 @ 04:21)  WBC Count: 8.19 K/uL (12-20-18 @ 08:14)  Hemoglobin: 8.3 g/dL (12-20-18 @ 08:14)  Hematocrit: 25.9 % (12-20-18 @ 08:14)  Platelet Count - Automated: 198 K/uL (12-20-18 @ 08:14)  Hemoglobin: 8.5 g/dL (12-19-18 @ 15:21)  Platelet Count - Automated: 224 K/uL (12-19-18 @ 15:21)  Hematocrit: 24.0 % (12-19-18 @ 15:21)  WBC Count: 8.1 K/uL (12-19-18 @ 15:21)      Serum Protein Electrophoresis Interp: Normal Electrophoresis Pattern (12-20 @ 13:26)  Immunofixation, Serum:   No Monoclonal Band Identified (12-20 @ 13:26)                  RADIOLOGY & ADDITIONAL STUDIES:

## 2018-12-24 NOTE — CONSULT NOTE ADULT - PROBLEM SELECTOR RECOMMENDATION 4
pts goal is to start chemotherapy as outpatient once renal function improves   he lives with his son and wife (who had a stroke)  was seen by PT who recommended home w/ PT

## 2018-12-24 NOTE — PROGRESS NOTE ADULT - ASSESSMENT
85 year old male  with pmhx of gastric CA presented with pericardial tamponade s/p pericardial drain with 300cc of fluid out initially

## 2018-12-24 NOTE — PROGRESS NOTE ADULT - SUBJECTIVE AND OBJECTIVE BOX
Patient is a 85y old  Male who presents with a chief complaint of Worsening creatinine with suprapubic fullness (24 Dec 2018 07:03)      INTERVAL HPI/OVERNIGHT EVENTS:    MEDICATIONS  (STANDING):  epoetin marimar Injectable 18358 Unit(s) IV Push <User Schedule>  ferrous    sulfate 325 milliGRAM(s) Oral two times a day  influenza   Vaccine 0.5 milliLiter(s) IntraMuscular once  levothyroxine 75 MICROGram(s) Oral daily  sodium bicarbonate 650 milliGRAM(s) Oral four times a day  tamsulosin 0.4 milliGRAM(s) Oral at bedtime    MEDICATIONS  (PRN):  acetaminophen   Tablet .. 650 milliGRAM(s) Oral every 6 hours PRN Mild Pain (1 - 3)      Allergies    No Known Allergies    Intolerances        Vital Signs Last 24 Hrs  T(C): 36.9 (24 Dec 2018 03:00), Max: 37.3 (23 Dec 2018 12:00)  T(F): 98.4 (24 Dec 2018 03:00), Max: 99.2 (23 Dec 2018 12:00)  HR: 83 (24 Dec 2018 08:00) (82 - 112)  BP: 102/54 (24 Dec 2018 08:00) (99/39 - 127/56)  BP(mean): 68 (24 Dec 2018 08:00) (59 - 86)  RR: 18 (24 Dec 2018 08:00) (13 - 22)  SpO2: 97% (24 Dec 2018 08:00) (97% - 100%)    LABS:                        7.8    7.6   )-----------( 251      ( 24 Dec 2018 04:29 )             23.1     12-24    138  |  102  |  81<H>  ----------------------------<  116<H>  4.6   |  21<L>  |  5.40<H>    Ca    8.9      24 Dec 2018 04:29  Phos  2.5     12-24  Mg     1.9     12-24    TPro  5.7<L>  /  Alb  3.0<L>  /  TBili  0.2  /  DBili  x   /  AST  11  /  ALT  8<L>  /  AlkPhos  83  12-24    PT/INR - ( 24 Dec 2018 04:29 )   PT: 12.0 sec;   INR: 1.05 ratio         PTT - ( 24 Dec 2018 04:29 )  PTT:30.4 sec      RADIOLOGY & ADDITIONAL TESTS:        Dr Lewis [phone] Patient is a 85y old  Male who presents with a chief complaint of Worsening creatinine with suprapubic fullness (24 Dec 2018 07:03)      INTERVAL HPI/OVERNIGHT EVENTS:  sinus on  tele  MEDICATIONS  (STANDING):  epoetin marimar Injectable 70259 Unit(s) IV Push <User Schedule>  ferrous    sulfate 325 milliGRAM(s) Oral two times a day  influenza   Vaccine 0.5 milliLiter(s) IntraMuscular once  levothyroxine 75 MICROGram(s) Oral daily  sodium bicarbonate 650 milliGRAM(s) Oral four times a day  tamsulosin 0.4 milliGRAM(s) Oral at bedtime    MEDICATIONS  (PRN):  acetaminophen   Tablet .. 650 milliGRAM(s) Oral every 6 hours PRN Mild Pain (1 - 3)      Allergies    No Known Allergies    Intolerances        Vital Signs Last 24 Hrs  T(C): 36.9 (24 Dec 2018 03:00), Max: 37.3 (23 Dec 2018 12:00)  T(F): 98.4 (24 Dec 2018 03:00), Max: 99.2 (23 Dec 2018 12:00)  HR: 83 (24 Dec 2018 08:00) (82 - 112)  BP: 102/54 (24 Dec 2018 08:00) (99/39 - 127/56)  BP(mean): 68 (24 Dec 2018 08:00) (59 - 86)  RR: 18 (24 Dec 2018 08:00) (13 - 22)  SpO2: 97% (24 Dec 2018 08:00) (97% - 100%)    LABS:                        7.8    7.6   )-----------( 251      ( 24 Dec 2018 04:29 )             23.1     12-24    138  |  102  |  81<H>  ----------------------------<  116<H>  4.6   |  21<L>  |  5.40<H>    Ca    8.9      24 Dec 2018 04:29  Phos  2.5     12-24  Mg     1.9     12-24    TPro  5.7<L>  /  Alb  3.0<L>  /  TBili  0.2  /  DBili  x   /  AST  11  /  ALT  8<L>  /  AlkPhos  83  12-24    PT/INR - ( 24 Dec 2018 04:29 )   PT: 12.0 sec;   INR: 1.05 ratio         PTT - ( 24 Dec 2018 04:29 )  PTT:30.4 sec      RADIOLOGY & ADDITIONAL TESTS:        Dr Lewis [phone]

## 2018-12-24 NOTE — PROGRESS NOTE ADULT - ASSESSMENT
84 yo man with  history of gastric CA  has never tx'd with chemotherapy and  was sent to ER for staging. CT showed large pericardial effusion. Pt is now in PICU with a drain in chest draining pericardial fluid.    12-21-18: R/O iron deficiency: check ferritin, b12/ folate    12-22-18: received Venofer this morning. Persistent odynophagia. await pathology of pericardial fluid.    12-23-18: No more odynophagia.. HCT decreased. Repeat Ferritin. Check B12/ Folate. If Ferritin is adequate, Pt will be given Procrit.    12-24-18; Ferritin > 220,  folate 5.2, B12 > 880. on Procrit, Will start Folate 1 mg QD

## 2018-12-24 NOTE — CONSULT NOTE ADULT - PROBLEM SELECTOR RECOMMENDATION 9
Gastric CA with patient consistently declining operative and standard chemotherapy options, with patient apparently on a helixor mistletoe treatment injections as a nonstandard approach with a group in [place]   as per son pt wanted to have a more natural approach but now is interested in receiving chemotherapy treatment   oncology Dr. Pacheco following

## 2018-12-24 NOTE — CONSULT NOTE ADULT - ATTENDING COMMENTS
Awaiting cytopathology; prognosis varies in malignant versus non-malignant effusion  Tentative plan is to undertake DMT with Dr. Junior Luo.  Attempted HCP assignment with the patient, no determination at this time  In the event of incapacity, [facility] hospital decision makign hierarchy  ACP 16 min Awaiting cytopathology; prognosis varies in malignant versus non-malignant effusion  Tentative plan is to undertake DMT with Dr. Junior Luo.  Attempted HCP assignment with the patient, no determination at this time  In the event of incapacity, [facility] decision making hierarchy  ACP 16 min

## 2018-12-24 NOTE — PROGRESS NOTE ADULT - PROBLEM SELECTOR PLAN 1
-s/p drain with 300 cc during pericardiocentesis, 30 cc out on 12/23, and 5cc out on 12/24 night shift  -repeat echo in AM, and possibly pull pericardial drain

## 2018-12-24 NOTE — PROGRESS NOTE ADULT - ASSESSMENT
This is an 85 year old man with past medical history of gastric CA electively treated nontraditionally with [place] admitted with progressive bilateral edema found to have pericardial effusion seen on CT Abd.  S/P Cath for pericardiocentesis drained 300cc, pericardial drained placed.

## 2018-12-24 NOTE — PROGRESS NOTE ADULT - ASSESSMENT
gastric cancer-s/p nontraditional treatment -bilateral leg  edema-s/p pericardial effusion-drain placed-renal  failure-chuy on atn/obst  uropathy gastric cancer-s/p nontraditional treatment -bilateral leg  edema-s/p pericardial effusion-drain placed-renal  failure-chuy on atn/obst  uropathy  Dr Markel Piedra will cover me from 12/25/18 on

## 2018-12-24 NOTE — CONSULT NOTE ADULT - PROBLEM SELECTOR RECOMMENDATION 3
s/p pericardiocenthesis   f/u cytopathology to r/o malignancy s/p pericardiocentesis   f/u cytopathology to r/o malignancy

## 2018-12-24 NOTE — PROGRESS NOTE ADULT - ASSESSMENT
86 y/o M-patient apparently with a history of gastric CA  non chemo treated with STELLA obstructive uropathy with hyperkalemia on recent blood work with pericardial tamponade on echo       1- STELLA   2- pericardial tamponade/effusion  3- obstructive uropathy  4- anemia     s/p pericardiocentesis  cont ayala  cr improving  has good uo.  no hd given cr improving   procrit 81422 units tiw   suspect stella in setting of ATN due to decrease cardiac outpt due to pericardial effusion as well as urinary retention likely   d/w ccu team

## 2018-12-24 NOTE — CHART NOTE - NSCHARTNOTEFT_GEN_A_CORE
Pt seen for malnutrition follow-up. Pt asleep at time of visit, son at bedside involved in pt's care asked to allow pt to rest and defer interview to him. Per son, pt tolerating current diet with no GI distress at this time, drinking 2-3 Nepro containers daily, tolerating light foods like fish, applesauce. New food preferences obtained.    Pt is an 85 year old male pt with PMH reported gastric CA, no surgical or chemo treatment presents with progressive B/L LE edema with progressive azotemia and with suprapubic fullness. Noted with STELLA on CKD and pericardial effusion seen on CT abd. S/P Cath for pericardiocentesis drained 300cc, pericardial drained placed. Nephrology following. Pt was visited by palliative care today.    Source: Patient [x]    Family [ ]     other [x] electronic medical records    Diet : DASH, Low Sodium, No concentrated K+, No Concentrated Phos; Supplement: Nepro x 3 daily (1275 kcal, 57 grams protein daily).    Patient reports [ ] nausea  [ ] vomiting [ ] diarrhea [ ] constipation  [ ]chewing problems [ ] swallowing issues  [ ] other: No GI distress reported by son or noted in flowsheet records. Last BM noted as 12/21/18    PO intake (meals):  < 50% [x] 50-75% [x]   % [ ]  other :  PO intake (supplements): 2-3 containers of Nepro per day    Source for PO intake [x] Patient [ ] family [x] chart [ ] staff [ ] other    Enteral/Parenteral Nutrition: n/a    Current Weight: 127.4 pounds (current, bedscale, no edema noted). 138.4 pounds admit wt 12/20/18. Pt s/p pericardiocentesis -300 ml with pericardial drain.    Pertinent Medications: MEDICATIONS  (STANDING):  epoetin marimar Injectable 47256 Unit(s) IV Push <User Schedule>  ferrous    sulfate 325 milliGRAM(s) Oral two times a day  influenza   Vaccine 0.5 milliLiter(s) IntraMuscular once  levothyroxine 75 MICROGram(s) Oral daily  sodium bicarbonate 650 milliGRAM(s) Oral four times a day  tamsulosin 0.4 milliGRAM(s) Oral at bedtime    MEDICATIONS  (PRN):  acetaminophen   Tablet .. 650 milliGRAM(s) Oral every 6 hours PRN Mild Pain (1 - 3)      Pertinent Labs:  12-24 Na138 mmol/L Glu 116 mg/dL<H> K+ 4.6 mmol/L Cr  5.40 mg/dL<H> BUN 81 mg/dL<H> 12-24 Phos 2.5 mg/dL 12-24 Alb 3.0 g/dL<L>      Skin: No pressure ulcer noted      Estimated Needs:   [x] no change since previous assessment  [ ] recalculated:       Previous Nutrition Diagnosis: Severe Malnutrition          Nutrition Diagnosis is [x] ongoing, addressed with po diet and supplements  [ ] resolved [ ] not applicable          New Nutrition Diagnosis: [x] not applicable      Interventions:     1) Recommend continue current diet order in setting of STELLA on CKD. Food preferences obtained and honored within therapeutic diet restrictions.  2) Continue Nepro supplement x 3 daily   3) Reviewed renal diet recommendations with pt's son and provided additional copies of K+ and Phosphorous nutrient lists per his request for reference as needed  4) Palliative care following for ongoing GOC discussion     Monitoring and Evaluation:     [x] PO intake [x] Tolerance to diet prescription [x] weights [x] follow up per protocol    [x] other: RD remains available: Anna Wooten MS, RDN, CDN, CDE. #[phone] Pt seen for malnutrition follow-up. Pt asleep at time of visit, son at bedside involved in pt's care asked to allow pt to rest and defer interview to him. Per son, pt tolerating current diet with no GI distress at this time, drinking 2-3 Nepro containers daily, tolerating light foods like fish, applesauce. New food preferences obtained.    Pt is an 85 year old male pt with PMH reported gastric CA, no surgical or chemo treatment presents with progressive B/L LE edema with progressive azotemia and with suprapubic fullness. Noted with STELLA on CKD and pericardial effusion seen on CT abd. S/P Cath for pericardiocentesis drained 300cc, pericardial drained placed. Nephrology following. Pt was visited by palliative care today.    Source: Patient [x]    Family [ ]     other [x] electronic medical records    Diet : DASH, Low Sodium, No concentrated K+, No Concentrated Phos; Supplement: Nepro x 3 daily (1275 kcal, 57 grams protein daily).    Patient reports [ ] nausea  [ ] vomiting [ ] diarrhea [ ] constipation  [ ]chewing problems [ ] swallowing issues  [ ] other: No GI distress reported by son or noted in flowsheet records. Last BM noted as 12/21/18    PO intake (meals):  < 50% [x] 50-75% [x]   % [ ]  other :  PO intake (supplements): 2-3 containers of Nepro per day    Source for PO intake [x] Patient [ ] family [x] chart [ ] staff [ ] other    Enteral/Parenteral Nutrition: n/a    Current Weight: 127.4 pounds (current, bedscale, no edema noted). 138.4 pounds admit wt 12/20/18. Pt s/p pericardiocentesis -300 ml with pericardial drain.    Pertinent Medications: MEDICATIONS  (STANDING):  epoetin marimar Injectable 36800 Unit(s) IV Push <User Schedule>  ferrous    sulfate 325 milliGRAM(s) Oral two times a day  influenza   Vaccine 0.5 milliLiter(s) IntraMuscular once  levothyroxine 75 MICROGram(s) Oral daily  sodium bicarbonate 650 milliGRAM(s) Oral four times a day  tamsulosin 0.4 milliGRAM(s) Oral at bedtime    MEDICATIONS  (PRN):  acetaminophen   Tablet .. 650 milliGRAM(s) Oral every 6 hours PRN Mild Pain (1 - 3)      Pertinent Labs:  12-24 Na138 mmol/L Glu 116 mg/dL<H> K+ 4.6 mmol/L Cr  5.40 mg/dL<H> BUN 81 mg/dL<H> 12-24 Phos 2.5 mg/dL 12-24 Alb 3.0 g/dL<L>      Skin: No pressure ulcer noted      Estimated Needs:   [x] no change since previous assessment  [ ] recalculated:       Previous Nutrition Diagnosis: Severe Malnutrition; food and nutrition-related knowledge deficit         Nutrition Diagnosis is [x] ongoing, addressed with po diet and supplements, review of diet education with pt's son  [ ] resolved [ ] not applicable          New Nutrition Diagnosis: [x] not applicable      Interventions:     1) Recommend continue current diet order in setting of STELLA on CKD. Food preferences obtained and honored within therapeutic diet restrictions.  2) Continue Nepro supplement x 3 daily   3) Reviewed renal diet and GI tolerance diet recommendations with pt's son and provided additional copies of K+ and Phosphorous nutrient lists per his request for reference as needed  4) Palliative care following for ongoing GOC discussion     Monitoring and Evaluation:     [x] PO intake [x] Tolerance to diet prescription [x] weights [x] follow up per protocol    [x] other: RD remains available: Anna Wooten MS, RDN, CDN, CDE. #[phone]

## 2018-12-24 NOTE — PROGRESS NOTE ADULT - ATTENDING COMMENTS
Patient is seen and examined with fellow, NP and the CCU house-staff. I agree with the history, physical and the assessment and plan.  renal function improving - with continuous good output  f/u with repeat TTE  and if neg for worseing effusion, will pull the drain

## 2018-12-24 NOTE — PROGRESS NOTE ADULT - PROBLEM SELECTOR PLAN 3
avoid nephrotoxic agents  -Dr. Manda Nunn following, Cr trending downwards from 7.09 to 6.14 avoid nephrotoxic agents  -Dr. Manda Nunn following, Cr trending downwards from 7.09 to 6.14  -Procrit TIW as per renal

## 2018-12-24 NOTE — CONSULT NOTE ADULT - SUBJECTIVE AND OBJECTIVE BOX
HPI:  86 y/o M--patient seen with patient's adult son in attendance--patient interviewed by examiner in patient's native Danish--son is bilingual and patient/son declined telephone --patient apparently with a history of gastric CA with patient consistently declining operative and standard chemotherapy options, with patient apparently on a mistletoe treatment (?) as a nonstandard approach with a group in [place] (?) following an evaluation in [place]--patient with progressive B/L LE oedema with progressive azotemia and with suprapubic fullness.   Patient with reported history of unspecified prostate disorder and follows Dr. Umaña above.  Apparently patient is now reconsidering treatment by Dr. Pacheco above but referred to the ER due to the progression of Cr.     PERTINENT PM/SXH:   Benign prostatic hyperplasia, unspecified whether lower urinary tract symptoms present  Hypothyroidism, unspecified type  Malignant neoplasm of stomach, unspecified location  No pertinent past medical history    No significant past surgical history    FAMILY HISTORY:  No pertinent family history in first degree relatives    ITEMS NOT CHECKED ARE NOT PRESENT    SOCIAL HISTORY:   Significant other/partner: wife [x]  Children: 5 [x] sons  Yarsanism/Spirituality: Pentecostalism  Substance hx:  [ ]   Tobacco hx:  [ ]   Alcohol hx: [ ]   Home Opioid hx:  [x] I-Stop Reference No: #: [id number]   Living Situation: [x]Home with wife and son  [ ]Long term care  [ ]Rehab [ ]Other    ADVANCE DIRECTIVES:    DNR  MOLST  [ ]  Living Will  [ ]   DECISION MAKER(s): no HCP, wife had a stroke and pt defers to children to make decisions   [ ] Health Care Proxy(s)  [ ] Surrogate(s)  [ ] Guardian           Name(s): Phone Number(s):    BASELINE (I)ADL(s) (prior to admission):  [place]: [ ]Total  [x] Moderate [ ]Dependent    Allergies    No Known Allergies    Intolerances    MEDICATIONS  (STANDING):  epoetin marimar Injectable 91798 Unit(s) IV Push <User Schedule>  ferrous    sulfate 325 milliGRAM(s) Oral two times a day  influenza   Vaccine 0.5 milliLiter(s) IntraMuscular once  levothyroxine 75 MICROGram(s) Oral daily  sodium bicarbonate 650 milliGRAM(s) Oral four times a day  tamsulosin 0.4 milliGRAM(s) Oral at bedtime    MEDICATIONS  (PRN):  acetaminophen   Tablet .. 650 milliGRAM(s) Oral every 6 hours PRN Mild Pain (1 - 3)    PRESENT SYMPTOMS: [ ]Unable to obtain due to poor mentation   Source if other than patient:  [ ]Family   [ ]Team     Pain (Impact on QOL):    Location -         Minimal acceptable level (0-10 scale):       Aggravating factors -  Quality -  Radiation -  Severity (0-10 scale) -    Timing -    PAIN AD Score:     [email or web address] (press ctrl +  left click to view)    Dyspnea:                           [ ]Mild [ ]Moderate [ ]Severe  Anxiety:                             [ ]Mild [ ]Moderate [ ]Severe  Fatigue:                             [ ]Mild [x]Moderate [ ]Severe  Nausea:                             [ ]Mild [ ]Moderate [ ]Severe  Loss of appetite:              [ ]Mild [ ]Moderate [ ]Severe  Constipation:                    [ ]Mild [ ]Moderate [ ]Severe    Other Symptoms:  [ ]All other review of systems negative     Karnofsky Performance Score/Palliative Performance Status Version 2: 60%    http://palliative.info/resource_material/PPSv2.pdf    PHYSICAL EXAM:  Vital Signs Last 24 Hrs  T(C): 36.9 (24 Dec 2018 03:00), Max: 37.2 (23 Dec 2018 23:00)  T(F): 98.4 (24 Dec 2018 03:00), Max: 98.9 (23 Dec 2018 23:00)  HR: 94 (24 Dec 2018 11:00) (82 - 112)  BP: 107/48 (24 Dec 2018 11:00) (99/39 - 127/56)  BP(mean): 64 (24 Dec 2018 11:00) (59 - 86)  RR: 20 (24 Dec 2018 11:00) (13 - 22)  SpO2: 98% (24 Dec 2018 11:00) (97% - 100%) I&O's Summary    23 Dec 2018 07:01  -  24 Dec 2018 07:00  --------------------------------------------------------  IN: 630 mL / OUT: 2200 mL / NET: -1570 mL    GENERAL:  [x]Alert  [x]Oriented x 3  [ ]Lethargic  [ ]Cachexia  [ ]Unarousable  [x]Verbal  [ ]Non-Verbal  Behavioral:   [ ] Anxiety  [ ] Delirium [ ] Agitation [ ] Other  HEENT:  [x]Normal   [ ]Dry mouth   [ ]ET Tube/Trach  [ ]Oral lesions  PULMONARY:   [x]Clear [ ]Tachypnea  [ ]Audible excessive secretions   [ ]Rhonchi        [ ]Right [ ]Left [ ]Bilateral  [ ]Crackles        [ ]Right [ ]Left [ ]Bilateral  [ ]Wheezing     [ ]Right [ ]Left [ ]Bilateral  CARDIOVASCULAR:    [x]Regular [ ]Irregular [ ]Tachy  [ ]Sukhjinder [ ]Murmur [x]Other pericardial drainage   GASTROINTESTINAL:  [x]Soft  [ ]Distended   [x]+BS  [x]Non tender [ ]Tender  [ ]PEG [ ]OGT/ NGT  Last BM:   GENITOURINARY:  [ ]Normal [ ] Incontinent   [ ]Oliguria/Anuria   [x]Byrnes  MUSCULOSKELETAL:   [x]Normal   [ ]Weakness  [ ]Bed/Wheelchair bound [ ]Edema  NEUROLOGIC:   [x]No focal deficits  [ ] Cognitive impairment  [ ] Dysphagia [ ]Dysarthria [ ] Paresis [ ]Other   SKIN:   [x]Normal   [ ]Pressure ulcer(s)  [ ]Rash    CRITICAL CARE:  [ ] Shock Present  [ ]Septic [ ]Cardiogenic [ ]Neurologic [ ]Hypovolemic  [ ]  Vasopressors [ ]  Inotropes   [ ] Respiratory failure present  [ ] Acute  [ ] Chronic [ ] Hypoxic  [ ] Hypercarbic [ ] Other  [ ] Other organ failure     LABS:                        7.8    7.6   )-----------( 251      ( 24 Dec 2018 04:29 )             23.1   12-24    138  |  102  |  81<H>  ----------------------------<  116<H>  4.6   |  21<L>  |  5.40<H>    Ca    8.9      24 Dec 2018 04:29  Phos  2.5     12-24  Mg     1.9     12-24    TPro  5.7<L>  /  Alb  3.0<L>  /  TBili  0.2  /  DBili  x   /  AST  11  /  ALT  8<L>  /  AlkPhos  83  12-24  PT/INR - ( 24 Dec 2018 04:29 )   PT: 12.0 sec;   INR: 1.05 ratio         PTT - ( 24 Dec 2018 04:29 )  PTT:30.4 sec    RADIOLOGY & ADDITIONAL STUDIES:  reviewed    PROTEIN CALORIE MALNUTRITION PRESENT: [ ] Yes [ ] No  [ ] PPSV2 < or = to 30% [ ] significant weight loss  [ ] poor nutritional intake [ ] catabolic state [ ] anasarca     Albumin, Serum: 3.0 g/dL (12-24-18 @ 04:29)  Artificial Nutrition [ ]     REFERRALS:   [ ]Chaplaincy  [ ] Hospice  [ ]Child Life  [ ]Social Work  [ ]Case management [ ]Holistic Therapy   Goals of Care Discussion Document: HPI:  84 y/o M--patient seen with patient's adult son in attendance--patient interviewed by examiner in patient's native Occitan--son is bilingual and patient/son declined telephone --patient apparently with a history of gastric CA with patient consistently declining operative and standard chemotherapy options, with patient apparently on a mistletoe treatment (?) as a nonstandard approach with a group in [place] (?) following an evaluation in [place]--patient with progressive B/L LE oedema with progressive azotemia and with suprapubic fullness.   Patient with reported history of unspecified prostate disorder and follows Dr. Umaña above.  Apparently patient is now reconsidering treatment by Dr. Pacheco above but referred to the ER due to the progression of Cr.     PERTINENT PM/SXH:   Benign prostatic hyperplasia, unspecified whether lower urinary tract symptoms present  Hypothyroidism, unspecified type  Malignant neoplasm of stomach, unspecified location  No pertinent past medical history    No significant past surgical history    FAMILY HISTORY:  No pertinent family history in first degree relatives    ITEMS NOT CHECKED ARE NOT PRESENT    SOCIAL HISTORY:   Significant other/partner: wife [x]  Children: 5 [x] sons  Tenriism/Spirituality: Mosque  Substance hx:  [ ]   Tobacco hx:  [ ]   Alcohol hx: [ ]   Home Opioid hx:  [x] I-Stop Reference No: #: [id number]   Living Situation: [x]Home with wife and son  [ ]Long term care  [ ]Rehab [ ]Other    ADVANCE DIRECTIVES:    DNR  MOLST  [ ]  Living Will  [ ]   DECISION MAKER(s): no HCP, wife had a stroke and pt defers to children to make decisions   [ ] Health Care Proxy(s)  [ ] Surrogate(s)  [ ] Guardian           Name(s): Phone Number(s):    BASELINE (I)ADL(s) (prior to admission):  [place]: [ ]Total  [x] Moderate [ ]Dependent    Allergies    No Known Allergies    Intolerances    MEDICATIONS  (STANDING):  epoetin marimar Injectable 82129 Unit(s) IV Push <User Schedule>  ferrous    sulfate 325 milliGRAM(s) Oral two times a day  influenza   Vaccine 0.5 milliLiter(s) IntraMuscular once  levothyroxine 75 MICROGram(s) Oral daily  sodium bicarbonate 650 milliGRAM(s) Oral four times a day  tamsulosin 0.4 milliGRAM(s) Oral at bedtime    MEDICATIONS  (PRN):  acetaminophen   Tablet .. 650 milliGRAM(s) Oral every 6 hours PRN Mild Pain (1 - 3)    PRESENT SYMPTOMS: [ ]Unable to obtain due to poor mentation   Source if other than patient:  [ ]Family   [ ]Team     Pain (Impact on QOL):    Location -         Minimal acceptable level (0-10 scale):       Aggravating factors -  Quality -  Radiation -  Severity (0-10 scale) -    Timing -    PAIN AD Score:     [email or web address] (press ctrl +  left click to view)    Dyspnea:                           [ ]Mild [ ]Moderate [ ]Severe  Anxiety:                             [ ]Mild [ ]Moderate [ ]Severe  Fatigue:                             [ ]Mild [x]Moderate [ ]Severe  Nausea:                             [ ]Mild [ ]Moderate [ ]Severe  Loss of appetite:              [ ]Mild [ ]Moderate [ ]Severe  Constipation:                    [ ]Mild [ ]Moderate [ ]Severe    Other Symptoms:  [ ]All other review of systems negative     Karnofsky Performance Score/Palliative Performance Status Version 2: 60%    http://palliative.info/resource_material/PPSv2.pdf    PHYSICAL EXAM:  Vital Signs Last 24 Hrs  T(C): 36.9 (24 Dec 2018 03:00), Max: 37.2 (23 Dec 2018 23:00)  T(F): 98.4 (24 Dec 2018 03:00), Max: 98.9 (23 Dec 2018 23:00)  HR: 94 (24 Dec 2018 11:00) (82 - 112)  BP: 107/48 (24 Dec 2018 11:00) (99/39 - 127/56)  BP(mean): 64 (24 Dec 2018 11:00) (59 - 86)  RR: 20 (24 Dec 2018 11:00) (13 - 22)  SpO2: 98% (24 Dec 2018 11:00) (97% - 100%) I&O's Summary    23 Dec 2018 07:01  -  24 Dec 2018 07:00  --------------------------------------------------------  IN: 630 mL / OUT: 2200 mL / NET: -1570 mL    GENERAL:  [x]Alert  [x]Oriented x 3  [ ]Lethargic  [ ]Cachexia  [ ]Unarousable  [x]Verbal  [ ]Non-Verbal  Behavioral:   [ ] Anxiety  [ ] Delirium [ ] Agitation [ ] Other  HEENT:  [x]Normal   [ ]Dry mouth   [ ]ET Tube/Trach  [ ]Oral lesions  PULMONARY:   [x]Clear [ ]Tachypnea  [ ]Audible excessive secretions   [ ]Rhonchi        [ ]Right [ ]Left [ ]Bilateral  [ ]Crackles        [ ]Right [ ]Left [ ]Bilateral  [ ]Wheezing     [ ]Right [ ]Left [ ]Bilateral  CARDIOVASCULAR:    [x]Regular [ ]Irregular [ ]Tachy  [ ]Sukhjinder [ ]Murmur [x]Other pericardial drainage   GASTROINTESTINAL:  [x]Soft  [ ]Distended   [x]+BS  [x]Non tender [ ]Tender  [ ]PEG [ ]OGT/ NGT  Last BM:   GENITOURINARY:  [ ]Normal [ ] Incontinent   [ ]Oliguria/Anuria   [x]Byrnes  MUSCULOSKELETAL:   [x]Normal   [ ]Weakness  [ ]Bed/Wheelchair bound [ ]Edema  NEUROLOGIC:   [x]No focal deficits  [ ] Cognitive impairment  [ ] Dysphagia [ ]Dysarthria [ ] Paresis [ ]Other   SKIN:   [x]Normal   [ ]Pressure ulcer(s)  [ ]Rash    CRITICAL CARE:  [ ] Shock Present  [ ]Septic [ ]Cardiogenic [ ]Neurologic [ ]Hypovolemic  [ ]  Vasopressors [ ]  Inotropes   [ ] Respiratory failure present  [ ] Acute  [ ] Chronic [ ] Hypoxic  [ ] Hypercarbic [ ] Other  [ ] Other organ failure     LABS:                        7.8    7.6   )-----------( 251      ( 24 Dec 2018 04:29 )             23.1   12-24    138  |  102  |  81<H>  ----------------------------<  116<H>  4.6   |  21<L>  |  5.40<H>    Ca    8.9      24 Dec 2018 04:29  Phos  2.5     12-24  Mg     1.9     12-24    TPro  5.7<L>  /  Alb  3.0<L>  /  TBili  0.2  /  DBili  x   /  AST  11  /  ALT  8<L>  /  AlkPhos  83  12-24  PT/INR - ( 24 Dec 2018 04:29 )   PT: 12.0 sec;   INR: 1.05 ratio    PTT - ( 24 Dec 2018 04:29 )  PTT:30.4 sec    RADIOLOGY & ADDITIONAL STUDIES:  reviewed    PROTEIN CALORIE MALNUTRITION PRESENT: [ ] Yes [ ] No  [ ] PPSV2 < or = to 30% [ ] significant weight loss  [ ] poor nutritional intake [ ] catabolic state [ ] anasarca     Albumin, Serum: 3.0 g/dL (12-24-18 @ 04:29)  Artificial Nutrition [ ]     REFERRALS:   [ ]Chaplaincy  [ ] Hospice  [ ]Child Life  [ ]Social Work  [ ]Case management [ ]Holistic Therapy   Goals of Care Discussion Document:

## 2018-12-24 NOTE — CONSULT NOTE ADULT - PROBLEM SELECTOR PROBLEM 1
Malignant neoplasm of stomach, unspecified location
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

## 2018-12-24 NOTE — CHART NOTE - NSCHARTNOTEFT_GEN_A_CORE
CCU Transfer Note    Transfer from: CCU    Transfer to: (X) Telemetry    Accepting Physician: Emmanuel Lehman    Signout given to: Emmanuel Lehman    CCU COURSE:  This is an 85 year old man with past medical history of gastric CA electively treated nontraditionally with [place]e admitted with progressive bilateral edema found to have pericardial effusion seen on CT Abd.  S/P Cath for pericardiocentesis drained 300cc, pericardial drained placed. Drain removed 12/24 AM with minimal drainage and echo showing no effusion.      PAST MEDICAL & SURGICAL HISTORY:  Benign prostatic hyperplasia, unspecified whether lower urinary tract symptoms present  Hypothyroidism, unspecified type  Malignant neoplasm of stomach, unspecified location  No significant past surgical history      Vital Signs Last 24 Hrs  T(C): 37.3 (24 Dec 2018 19:00), Max: 37.3 (24 Dec 2018 19:00)  T(F): 99.1 (24 Dec 2018 19:00), Max: 99.1 (24 Dec 2018 19:00)  HR: 92 (24 Dec 2018 19:00) (82 - 102)  BP: 120/61 (24 Dec 2018 19:00) (97/61 - 125/55)  BP(mean): 77 (24 Dec 2018 19:00) (59 - 77)  RR: 22 (24 Dec 2018 19:00) (13 - 29)  SpO2: 100% (24 Dec 2018 19:00) (97% - 100%)  I&O's Summary    23 Dec 2018 07:01  -  24 Dec 2018 07:00  --------------------------------------------------------  IN: 630 mL / OUT: 2200 mL / NET: -1570 mL    24 Dec 2018 07:01  -  24 Dec 2018 19:49  --------------------------------------------------------  IN: 840 mL / OUT: 1450 mL / NET: -610 mL      Allergies    No Known Allergies    Intolerances      MEDICATIONS  (STANDING):  epoetin marimar Injectable 96162 Unit(s) IV Push <User Schedule>  ferrous    sulfate 325 milliGRAM(s) Oral two times a day  folic acid 1 milliGRAM(s) Oral daily  influenza   Vaccine 0.5 milliLiter(s) IntraMuscular once  levothyroxine 75 MICROGram(s) Oral daily  sodium bicarbonate 650 milliGRAM(s) Oral four times a day  tamsulosin 0.4 milliGRAM(s) Oral at bedtime    MEDICATIONS  (PRN):  acetaminophen   Tablet .. 650 milliGRAM(s) Oral every 6 hours PRN Mild Pain (1 - 3)                          7.8    7.6   )-----------( 251      ( 24 Dec 2018 04:29 )             23.1     12-24    138  |  102  |  81<H>  ----------------------------<  116<H>  4.6   |  21<L>  |  5.40<H>    Ca    8.9      24 Dec 2018 04:29  Phos  2.5     12-24  Mg     1.9     12-24    TPro  5.7<L>  /  Alb  3.0<L>  /  TBili  0.2  /  DBili  x   /  AST  11  /  ALT  8<L>  /  AlkPhos  83  12-24    PT/INR - ( 24 Dec 2018 04:29 )   PT: 12.0 sec;   INR: 1.05 ratio         PTT - ( 24 Dec 2018 04:29 )  PTT:30.4 sec    PHYSICAL EXAM:  Gen: alert oriented place pale appearing. speaks limited english   Pulm: Decreased breath sounds b/l bases. no rales or ronchi or wheezing  CV: RRR, S1/S2. no rub  Back: No CVA tenderness  Abd: +BS, soft, nontender/nondistended  : Byrnes draining clear yellow urine. No suprapubic tenderness.   Extremity: No cyanosis, no edema no clubbing      Ekg: < from: 12 Lead ECG (12.23.18 @ 05:14) >    Ventricular Rate 101 BPM    Atrial Rate 101 BPM    P-R Interval 222 ms    QRS Duration 80 ms    Q-T Interval 330 ms    QTC Calculation(Bezet) 427 ms    P Axis 6 degrees    R Axis 7 degrees    T Axis 67 degrees    Diagnosis Line SINUS TACHYCARDIA WITH 1ST DEGREE A-V BLOCK  OTHERWISE NORMAL ECG    Confirmed by URBAN FERRELL MD ([id number]) on 12/23/2018 10:30:30 AM    < end of copied text >    Telemetry:  NSR  Echo: < from: Limited Transthoracic Echo (12.24.18 @ 05:55) >    Conclusions:  Limited TTE to evaluate the pericardium.  1. Thickened pericardium. No pericardial effusion.  *** Compared with echocardiogram of 12/22/2018, no  significant changes noted.    < end of copied text >    Imaging: < from: CT Abdomen and Pelvis No Cont (12.19.18 @ 16:49) >    IMPRESSION:     Large pericardial effusion and small left pleural effusion.    Bilateral hydroureteronephrosis, severe on the left and moderate on the   right, to the level of the urinary bladder. Small bladder calculi are   noted in the region of the ureterovesical junction on the left. Marked   urinary bladder wall thickening and perivesicular stranding may be   related to chronic outlet obstruction from an enlarged prostate.   Correlate with urinalysis to evaluate for cystitis. Underlying bladder   neoplasm cannot be excluded.    Thickening of the gastric wall in the region of the fundus likely   corresponds with patient's known gastric cancer. In addition, there is   gastro- hepatic ligament adenopathy. Correlation with prior imaging is   recommended.      < end of copied text >      ASSESSMENT & PLAN:     This is an 85 year old man with past medical history of gastric CA electively treated nontraditionally with [place] admitted with progressive bilateral edema found to have pericardial effusion seen on CT Abd.  S/P Cath for pericardiocentesis drained 300cc, pericardial drained placed.    #Pericardial effusion  -s/p pericardiocentesis with pericardial drain  -TTE this morning without effusion. Drain pulled with morning after output of 5cc yesterday.    #Acute kidney injury superimposed on CKD  -Scr 5.4 (admit 7.9) in setting of severe bilateral hydroureteronephrosis  -Avoid nephrotoxic agents  -Renal following  -Byrnes in place for obstructive uropathy    #Anemia in neoplastic disease  - C/w Iron sulfate    #Malignant neoplasm of stomach, unspecified location  -May seek traditional treatment  -Onc following    SOLOMON Mcclendon PA-C  #[id number]

## 2018-12-24 NOTE — CONSULT NOTE ADULT - PROBLEM SELECTOR RECOMMENDATION 5
Discussed with son importance of advance care planning, pt has wife who is not involved in decision making as she had a recent stroke. Pt has 5 male children and has not assigned a HCP.   Discussed with son Jameson at bedside about advance directives, he stated: he and his brothers have filled out a MOLST form for their mother but have never discussed advance directives with their father as he has been in good health until now.   Will fill out HCP before DC

## 2018-12-24 NOTE — PROGRESS NOTE ADULT - PROBLEM SELECTOR PLAN 2
Creat 5.4 (admit 7.9)  Avoid nephrotoxic agents  Renal following Creat 5.4 (admit 7.9) in setting of severe bilateral hydrouretonephrosis  Avoid nephrotoxic agents  Renal following

## 2018-12-24 NOTE — CONSULT NOTE ADULT - ASSESSMENT
84 y/o M--patient seen with patient's adult son in attendance--patient interviewed by examiner in patient's native [place]--son is bilingual and patient/son declined telephone --patient apparently with a history of gastric CA with patient consistently declining operative and standard chemotherapy options, with patient apparently on a mistletoe treatment (?) as a nonstandard approach with a group in [place] (?) following an evaluation in [place]--patient with progressive B/L LE oedema with progressive azotemia and with suprapubic fullness.   Patient with reported history of unspecified prostate disorder and follows Dr. Umaña above.  Apparently patient is now reconsidering treatment by Dr. Pacheco above but referred to the ER due to the progression of Cr.
84 y/o M-patient apparently with a history of gastric CA  non chemo treated with STELLA obstructive uropathy with hyperkalemia on recent blood work with pericardial tamponade on echo       1- STELLA   2- pericardial tamponade/effusion  3- obstructive uropathy      his pericardial effusion needs to be drained. d/w cards re: this  certainly effusion can also be due to uremia as well  he is not dialysis candidate due to his gastric ca  as for STELLA can also be due to obstructive uropathy perhaps chronic in nature as well. cont ayala  hyperkalemia better on inpt bloods  mild acidosis trend bicarb  anemia likely due to renal failure as well as malignancy  check iron profile as well  d/w Dr Pulliam
86 yo man with  history of gastric CA  has never tx'd with chemotherapy and  was sent to ER for staging. CT showed large pericardial effusion. Pt is now in PICU with a drain in chest draining pericardial fluid.

## 2018-12-24 NOTE — PROGRESS NOTE ADULT - SUBJECTIVE AND OBJECTIVE BOX
CHIEF COMPLAINT::    INTERVAL HISTORY:    REVIEW OF SYSTEMS: all others negative    MEDICATIONS  (STANDING):  epoetin mariamr Injectable 85491 Unit(s) IV Push <User Schedule>  ferrous    sulfate 325 milliGRAM(s) Oral two times a day  influenza   Vaccine 0.5 milliLiter(s) IntraMuscular once  levothyroxine 75 MICROGram(s) Oral daily  sodium bicarbonate 650 milliGRAM(s) Oral four times a day  tamsulosin 0.4 milliGRAM(s) Oral at bedtime    MEDICATIONS  (PRN):  acetaminophen   Tablet .. 650 milliGRAM(s) Oral every 6 hours PRN Mild Pain (1 - 3)      Objective:  Vital Signs Last 24 Hrs  T(C): 36.9 (24 Dec 2018 03:00), Max: 37.3 (23 Dec 2018 12:00)  T(F): 98.4 (24 Dec 2018 03:00), Max: 99.2 (23 Dec 2018 12:00)  HR: 84 (24 Dec 2018 06:00) (84 - 112)  BP: 100/52 (24 Dec 2018 06:00) (99/39 - 127/56)  BP(mean): 67 (24 Dec 2018 06:00) (59 - 86)  RR: 18 (24 Dec 2018 06:00) (13 - 22)  SpO2: 97% (24 Dec 2018 06:00) (97% - 100%)  ICU Vital Signs Last 24 Hrs  T(C): 36.9 (24 Dec 2018 03:00), Max: 37.3 (23 Dec 2018 12:00)  T(F): 98.4 (24 Dec 2018 03:00), Max: 99.2 (23 Dec 2018 12:00)  HR: 84 (24 Dec 2018 06:00) (84 - 112)  BP: 100/52 (24 Dec 2018 06:00) (99/39 - 127/56)  BP(mean): 67 (24 Dec 2018 06:00) (59 - 86)  ABP: --  ABP(mean): --  RR: 18 (24 Dec 2018 06:00) (13 - 22)  SpO2: 97% (24 Dec 2018 06:00) (97% - 100%)      Adult Advanced Hemodynamics Last 24 Hrs  CVP(mm Hg): --  CVP(cm H2O): --  CO: --  CI: --  PA: --  PA(mean): --  PCWP: --  SVR: --  SVRI: --  PVR: --  PVRI: --       @ 07:01  -   @ 07:00  --------------------------------------------------------  IN: 630 mL / OUT: 2200 mL / NET: -1570 mL      Daily     Daily Weight in k.8 (24 Dec 2018 04:00)    PHYSICAL EXAM:      Constitutional:    Eyes:    ENMT:    Neck:    Breasts:    Back:    Respiratory:    Cardiovascular:    Gastrointestinal:    Genitourinary:    Rectal:    Extremities:    Vascular:    Neurological:    Skin:    Lymph Nodes:    Musculoskeletal:    Psychiatric:        TELEMETRY:     EKG:     CARDIAC CATH:     ECHO:    IMAGIN.8    7.6   )-----------( 251      ( 24 Dec 2018 04:29 )             23.1     12-    138  |  102  |  81<H>  ----------------------------<  116<H>  4.6   |  21<L>  |  5.40<H>    Ca    8.9      24 Dec 2018 04:29  Phos  2.5     12-  Mg     1.9         TPro  5.7<L>  /  Alb  3.0<L>  /  TBili  0.2  /  DBili  x   /  AST  11  /  ALT  8<L>  /  AlkPhos  83  1224    LIVER FUNCTIONS - ( 24 Dec 2018 04:29 )  Alb: 3.0 g/dL / Pro: 5.7 g/dL / ALK PHOS: 83 U/L / ALT: 8 U/L / AST: 11 U/L / GGT: x           PT/INR - ( 24 Dec 2018 04:29 )   PT: 12.0 sec;   INR: 1.05 ratio         PTT - ( 24 Dec 2018 04:29 )  PTT:30.4 sec      *BLOOD GAS/ARTERIAL/MIXED/VENOUS  *LACTATE      HEALTH ISSUES - PROBLEM Dx:  Pericardial tamponade: Pericardial tamponade  Prophylactic measure: Prophylactic measure  Anemia in neoplastic disease: Anemia in neoplastic disease  Malignant neoplasm of stomach, unspecified location: Malignant neoplasm of stomach, unspecified location  Hypertension, unspecified type: Hypertension, unspecified type  Pericardial effusion: Pericardial effusion  Hypothyroidism, unspecified type: Hypothyroidism, unspecified type  Bladder wall thickening: Bladder wall thickening  Stage 5 chronic kidney disease not on chronic dialysis: Stage 5 chronic kidney disease not on chronic dialysis        HEALTH ISSUES - R/O PROBLEM Dx:      SOLOMON Hester NP   # CHIEF COMPLAINT: Pericardial Effusion    INTERVAL HISTORY:  This is an 85 year old man with past medical history of gastric CA electively treated nontraditionally with [place] admitted with progressive bilateral edema found to have pericardial effusion seen on CT Abd.  S/P Cath for pericardiocentesis drained 300cc, pericardial drained placed.    REVIEW OF SYSTEMS: all others negative    MEDICATIONS  (STANDING):  epoetin marimar Injectable 79498 Unit(s) IV Push <User Schedule>  ferrous    sulfate 325 milliGRAM(s) Oral two times a day  influenza   Vaccine 0.5 milliLiter(s) IntraMuscular once  levothyroxine 75 MICROGram(s) Oral daily  sodium bicarbonate 650 milliGRAM(s) Oral four times a day  tamsulosin 0.4 milliGRAM(s) Oral at bedtime    MEDICATIONS  (PRN):  acetaminophen   Tablet .. 650 milliGRAM(s) Oral every 6 hours PRN Mild Pain (1 - 3)      Objective:  Vital Signs Last 24 Hrs  T(C): 36.9 (24 Dec 2018 03:00), Max: 37.3 (23 Dec 2018 12:00)  T(F): 98.4 (24 Dec 2018 03:00), Max: 99.2 (23 Dec 2018 12:00)  HR: 84 (24 Dec 2018 06:00) (84 - 112)  BP: 100/52 (24 Dec 2018 06:00) (99/39 - 127/56)  BP(mean): 67 (24 Dec 2018 06:00) (59 - 86)  RR: 18 (24 Dec 2018 06:00) (13 - 22)  SpO2: 97% (24 Dec 2018 06:00) (97% - 100%)  ICU Vital Signs Last 24 Hrs  T(C): 36.9 (24 Dec 2018 03:00), Max: 37.3 (23 Dec 2018 12:00)  T(F): 98.4 (24 Dec 2018 03:00), Max: 99.2 (23 Dec 2018 12:00)  HR: 84 (24 Dec 2018 06:00) (84 - 112)  BP: 100/52 (24 Dec 2018 06:00) (99/39 - 127/56)  BP(mean): 67 (24 Dec 2018 06:00) (59 - 86)  ABP: --  ABP(mean): --  RR: 18 (24 Dec 2018 06:00) (13 - 22)  SpO2: 97% (24 Dec 2018 06:00) (97% - 100%)      Adult Advanced Hemodynamics Last 24 Hrs  CVP(mm Hg): --  CVP(cm H2O): --  CO: --  CI: --  PA: --  PA(mean): --  PCWP: --  SVR: --  SVRI: --  PVR: --  PVRI: --       @ 07:01  -   @ 07:00  --------------------------------------------------------  IN: 630 mL / OUT: 2200 mL / NET: -1570 mL      Daily     Daily Weight in k.8 (24 Dec 2018 04:00)    PHYSICAL EXAM:      Constitutional:    Eyes:    ENMT:    Neck:    Breasts:    Back:    Respiratory:    Cardiovascular:    Gastrointestinal:    Genitourinary:    Rectal:    Extremities:    Vascular:    Neurological:    Skin:    Lymph Nodes:    Musculoskeletal:    Psychiatric:        TELEMETRY:     EKG:     CARDIAC CATH:     ECHO:    IMAGIN.8    7.6   )-----------( 251      ( 24 Dec 2018 04:29 )             23.1     12-    138  |  102  |  81<H>  ----------------------------<  116<H>  4.6   |  21<L>  |  5.40<H>    Ca    8.9      24 Dec 2018 04:29  Phos  2.5     12-  Mg     1.9     -24    TPro  5.7<L>  /  Alb  3.0<L>  /  TBili  0.2  /  DBili  x   /  AST  11  /  ALT  8<L>  /  AlkPhos  83  12-24    LIVER FUNCTIONS - ( 24 Dec 2018 04:29 )  Alb: 3.0 g/dL / Pro: 5.7 g/dL / ALK PHOS: 83 U/L / ALT: 8 U/L / AST: 11 U/L / GGT: x           PT/INR - ( 24 Dec 2018 04:29 )   PT: 12.0 sec;   INR: 1.05 ratio         PTT - ( 24 Dec 2018 04:29 )  PTT:30.4 sec      *BLOOD GAS/ARTERIAL/MIXED/VENOUS  *LACTATE      HEALTH ISSUES - PROBLEM Dx:  Pericardial tamponade: Pericardial tamponade  Prophylactic measure: Prophylactic measure  Anemia in neoplastic disease: Anemia in neoplastic disease  Malignant neoplasm of stomach, unspecified location: Malignant neoplasm of stomach, unspecified location  Hypertension, unspecified type: Hypertension, unspecified type  Pericardial effusion: Pericardial effusion  Hypothyroidism, unspecified type: Hypothyroidism, unspecified type  Bladder wall thickening: Bladder wall thickening  Stage 5 chronic kidney disease not on chronic dialysis: Stage 5 chronic kidney disease not on chronic dialysis        HEALTH ISSUES - R/O PROBLEM Dx:      SOLOMON Hester NP   # CHIEF COMPLAINT: Pericardial Effusion    INTERVAL HISTORY:  This is an 85 year old man with past medical history of gastric CA electively treated nontraditionally with [place] admitted with progressive bilateral edema found to have pericardial effusion seen on CT Abd.  S/P Cath for pericardiocentesis drained 300cc, pericardial drained placed.    REVIEW OF SYSTEMS: Denies CP, SOB, all others negative    MEDICATIONS  (STANDING):  epoetin marimar Injectable 92976 Unit(s) IV Push <User Schedule>  ferrous    sulfate 325 milliGRAM(s) Oral two times a day  influenza   Vaccine 0.5 milliLiter(s) IntraMuscular once  levothyroxine 75 MICROGram(s) Oral daily  sodium bicarbonate 650 milliGRAM(s) Oral four times a day  tamsulosin 0.4 milliGRAM(s) Oral at bedtime    MEDICATIONS  (PRN):  acetaminophen   Tablet .. 650 milliGRAM(s) Oral every 6 hours PRN Mild Pain (1 - 3)      Objective:  Vital Signs Last 24 Hrs  T(C): 36.9 (24 Dec 2018 03:00), Max: 37.3 (23 Dec 2018 12:00)  T(F): 98.4 (24 Dec 2018 03:00), Max: 99.2 (23 Dec 2018 12:00)  HR: 84 (24 Dec 2018 06:00) (84 - 112)  BP: 100/52 (24 Dec 2018 06:00) (99/39 - 127/56)  BP(mean): 67 (24 Dec 2018 06:00) (59 - 86)  RR: 18 (24 Dec 2018 06:00) (13 - 22)  SpO2: 97% (24 Dec 2018 06:00) (97% - 100%)  ICU Vital Signs Last 24 Hrs  T(C): 36.9 (24 Dec 2018 03:00), Max: 37.3 (23 Dec 2018 12:00)  T(F): 98.4 (24 Dec 2018 03:00), Max: 99.2 (23 Dec 2018 12:00)  HR: 84 (24 Dec 2018 06:00) (84 - 112)  BP: 100/52 (24 Dec 2018 06:00) (99/39 - 127/56)  BP(mean): 67 (24 Dec 2018 06:00) (59 - 86)  ABP: --  ABP(mean): --  RR: 18 (24 Dec 2018 06:00) (13 - 22)  SpO2: 97% (24 Dec 2018 06:00) (97% - 100%)      Adult Advanced Hemodynamics Last 24 Hrs  CVP(mm Hg): --  CVP(cm H2O): --  CO: --  CI: --  PA: --  PA(mean): --  PCWP: --  SVR: --  SVRI: --  PVR: --  PVRI: --       @ 07:01  -   @ 07:00  --------------------------------------------------------  IN: 630 mL / OUT: 2200 mL / NET: -1570 mL      Daily     Daily Weight in k.8 (24 Dec 2018 04:00)    PHYSICAL EXAM:      Constitutional: No acute distress    Eyes: PERRL, EOMI    ENMT: Nml oral mucosa    Respiratory: CTA Bilaterally    Cardiovascular: S1S2 RRR, Midsternal pericardial drain    Gastrointestinal: +BS    Genitourinary: ayala    Extremities: Trace pedal edema    Vascular: +1 pedal pulses    Neurological: A+OX3      TELEMETRY: SR    EKG:     CARDIAC CATH:   < from: Cardiac Cath Lab - Adult (18 @ 15:29) >  [facility]  Department of Cardiology  [address] [place] [address]  [phone]  Cath Lab Report -- Comprehensive Report  Patient: DANIELLA CASEY  Study date: 2018  Account number: [id number]  MR number: [id number]  : 1933  Gender: Male  Race: W  Case Physician(s):  Clinton Khanna M.D.  Fellow:  MACIEJ Kumar M.D.  Referring Physician:  INDICATIONS: Acute systolic congestive heart failure.  HISTORY: There was a prior diagnosis of congestive heart failure. The  patient has hypertension.  PROCEDURE:  --  Sonosite - Diagnostic.  --  Venography.  --  Therapeutic pericardiocentesis.  TECHNIQUE: The risks and alternatives of the procedures and conscious  sedation were explained to the patient and informed consent was obtained.  Cardiac catheterization performed electively.  Local anesthetic given. Sonosite - Diagnostic. Venography. Therapeutic  pericardiocentesis. RADIATION EXPOSURE: 0.3 min.  MEDICATIONS GIVEN: Fentanyl, 50 mcg, IV. Midazolam, 1 mg, IV.  COMPLICATIONS: There were no complications.  DIAGNOSTIC RECOMMENDATIONS: The patient should continue with the present  medications. Successful pericardicentesis with removal of 500 cc of  slighly blood tinged strawcolored fluid. Drain left in place  Prepared and signed by  Clinton Khanna M.D.  Signed 2018 12:53:17  HEMODYNAMIC TABLES  Outputs:  Baseline  Outputs:  -- CALCULATIONS[age]  Outputs:  -- CALCULATIONS: Body Surface Area: 1.72  Outputs:  -- CALCULATIONS: Height in cm: 165.00  Outputs:  -- CALCULATIONS: Sex: Male  Outputs:  -- CALCULATIONS: Weight in k.80  Outputs:  -- OUTPUTS: O2 consumption: 215.62  Outputs:  -- OUTPUTS: Vo2 Indexed: 125.00    < end of copied text >    ECHO:  < from: Limited Transthoracic Echo (18 @ 05:48) >  Patient name: DANIELLA CASEY  YOB: 1933   Age: 85 (M)   MR#: [id number]  Study Date: 2018  [id number] Lauren Mcnally RDCS  Study quality: Technically fair  Referring Physician: Carolyn Thompson MD  Blood Pressure: 120/52 mmHg  Height: 165 cm  Weight: 66 kg  BSA: 1.7 m2  ------------------------------------------------------------------------  PROCEDURE: Limited transthoracic echocardiogram with 2-D.  M-Mode and spectral and color flow Doppler.  INDICATION:Pericardial effusion (noninflammatory) (I31.3)  ------------------------------------------------------------------------  CONCLUSIONS:  Limited TTE  1. Hyperdynamic left ventricular systolic function.  2. Normal right ventricular size and function.  3. Small pericardial effusion. No echocardiographic  evidence of pericardial tamponade.  4. Left pleural effusion.  *** Compared with echocardiogram of 2018, effusion is  now small.  ------------------------------------------------------------------------  Confirmed on  2018 - 17:25:38 by Esperanza Rizvi M.D.  ------------------------------------------------------------------------    < end of copied text >      IMAGING:   < from: CT Abdomen and Pelvis No Cont (12..18 @ 16:49) >    EXAM:  CT ABDOMEN AND PELVIS                          EXAM:  CT CHEST                            PROCEDURE DATE:  2018            INTERPRETATION:  CLINICAL INFORMATION: Abdominal pain. Gastric cancer.   Elevated creatinine.  Evaluate for metastatic disease.    COMPARISON: CT abdomen pelvis 2016. CT chest 2017.    PROCEDURE:   CT of the Chest, Abdomen and Pelvis was performed without intravenous   contrast.   Intravenous contrast: None.  Oral contrast: None.  Sagittal and coronalreformats were performed.    FINDINGS:    CHEST:     LUNGS AND LARGE AIRWAYS: Patent central airways.  Calcified granuloma in   the left upper lobe. Bibasilar and lingular atelectasis.  PLEURA: Small left pleural effusion.  VESSELS: Coronary artery and aortic calcifications.  HEART: Heart size is normal. Large pericardial effusion.  MEDIASTINUM AND DANIELLE: Calcified mediastinal lymph nodes, unchanged.  CHEST WALL AND LOWER NECK: Within normal limits.    ABDOMEN AND PELVIS:    Evaluation of the solid viscera is limited without intravenous contrast.    LIVER: Within normal limits.  BILE DUCTS: Normal caliber.  GALLBLADDER: Cholelithiasis.  SPLEEN: Within normal limits.  PANCREAS: Within normal limits.  ADRENALS: Within normal limits.  KIDNEYS/URETERS: There is bilateral hydroureteronephrosis, with   dilatation of the ureters to the level of the urinary bladder, moderate   on the right and severe on the left.     BLADDER: The urinary bladder is collapsed around a Ayala catheter. There   is concentric urinary bladder wall thickening and perivesicular   stranding. Several calculi within the urinary bladder.  REPRODUCTIVE ORGANS: The prostate is enlarged.    BOWEL: Thickening of the gastric wall in the region of the fundus likely   corresponds with patient's known gastric cancer. No bowel obstruction.   Appendix is normal.  PERITONEUM: Trace pelvic free fluid.  VESSELS:  Atherosclerotic calcification.  RETROPERITONEUM: Gastrohepatic ligament adenopathy, measuring 1.7 x 1.5   cm (2:73).     ABDOMINAL WALL: Within normal limits.  BONES: Multilevel degenerative changes.    IMPRESSION:     Large pericardial effusion and small left pleural effusion.    Bilateral hydroureteronephrosis, severe on the left and moderate on the   right, to the level of the urinary bladder. Small bladder calculi are   noted in the region of the ureterovesical junction on the left. Marked   urinary bladder wall thickening and perivesicular stranding may be   related to chronic outlet obstruction from an enlarged prostate.   Correlate with urinalysis to evaluate for cystitis. Underlying bladder   neoplasm cannot be excluded.    Thickening of the gastric wall in the region of the fundus likely   corresponds with patient's known gastric cancer. In addition, there is   gastro- hepatic ligament adenopathy. Correlation with prior imaging is   recommended.    < end of copied text >                            7.8    7.6   )-----------( 251      ( 24 Dec 2018 04:29 )             23.1     12-24    138  |  102  |  81<H>  ----------------------------<  116<H>  4.6   |  21<L>  |  5.40<H>    Ca    8.9      24 Dec 2018 04:29  Phos  2.5     12-24  Mg     1.9     12-24    TPro  5.7<L>  /  Alb  3.0<L>  /  TBili  0.2  /  DBili  x   /  AST  11  /  ALT  8<L>  /  AlkPhos  83  12-24    LIVER FUNCTIONS - ( 24 Dec 2018 04:29 )  Alb: 3.0 g/dL / Pro: 5.7 g/dL / ALK PHOS: 83 U/L / ALT: 8 U/L / AST: 11 U/L / GGT: x           PT/INR - ( 24 Dec 2018 04:29 )   PT: 12.0 sec;   INR: 1.05 ratio         PTT - ( 24 Dec 2018 04:29 )  PTT:30.4 sec      *BLOOD GAS/ARTERIAL/MIXED/VENOUS  *LACTATE      HEALTH ISSUES - PROBLEM Dx:  Pericardial tamponade: Pericardial tamponade  Prophylactic measure: Prophylactic measure  Anemia in neoplastic disease: Anemia in neoplastic disease  Malignant neoplasm of stomach, unspecified location: Malignant neoplasm of stomach, unspecified location  Hypertension, unspecified type: Hypertension, unspecified type  Pericardial effusion: Pericardial effusion  Hypothyroidism, unspecified type: Hypothyroidism, unspecified type  Bladder wall thickening: Bladder wall thickening  Stage 5 chronic kidney disease not on chronic dialysis: Stage 5 chronic kidney disease not on chronic dialysis        HEALTH ISSUES - R/O PROBLEM Dx:      SOLOMON Hester NP   #[id number]

## 2018-12-24 NOTE — PROGRESS NOTE ADULT - SUBJECTIVE AND OBJECTIVE BOX
CCU MIDNIGHT ROUNDS    DANIELLA CASEY  [id number]  85y  Male    SUMMARY:  84 y/o M--patient seen with patient's adult son in attendance--patient interviewed by examiner in patient's native [place]--son is bilingual and patient/son declined telephone --patient apparently with a history of gastric CA with patient consistently declining operative and standard chemotherapy options, with patient apparently on a mistletoe treatment (?) as a nonstandard approach with a group in [place] (?) following an evaluation in [place]--patient with progressive B/L LE oedema with progressive azotemia and with suprapubic fullness.   Patient with reported history of unspecified prostate disorder and follows Dr. Umaña above.  Apparently patient is now reconsidering treatment by Dr. Pacheco above but referred to the ER due to the progression of Cr.  Patient denies abdominal pain, no red blood per rectum or melena.  No chest pain/pressure.  No dyspnoea.  No back pain, no tearing back pain.   No fever, no chills, no rigors.   No HA, no focal weakness.   Patient with anorexia and unspecified involuntary weight loss.   No rash, no joint pain.   No dysuria, no haematuria.   Remaining review of systems not contributory. (19 Dec 2018 19:07)      NEW EVENTS:  Cytology revealed elevated total nucleated cells and elevated rbc's.      UPDATED VITALS:    ICU Vital Signs Last 24 Hrs  T(C): 36.9 (24 Dec 2018 03:00), Max: 37.3 (23 Dec 2018 05:18)  T(F): 98.4 (24 Dec 2018 03:00), Max: 99.2 (23 Dec 2018 12:00)  HR: 84 (24 Dec 2018 03:00) (84 - 112)  BP: 107/51 (24 Dec 2018 03:00) (99/39 - 127/56)  BP(mean): 67 (24 Dec 2018 03:00) (59 - 86)  ABP: --  ABP(mean): --  RR: 20 (24 Dec 2018 03:00) (13 - 22)  SpO2: 97% (24 Dec 2018 03:00) (97% - 100%)      I&O's Summary    22 Dec 2018 07:01  -  23 Dec 2018 07:00  --------------------------------------------------------  IN: 640 mL / OUT: 2575 mL / NET: -1935 mL    23 Dec 2018 07:01  -  24 Dec 2018 04:06  --------------------------------------------------------  IN: 430 mL / OUT: 1935 mL / NET: -1505 mL            NEW LABS:                          7.8    7.1   )-----------( 246      ( 23 Dec 2018 05:35 )             22.7     12-23    138  |  104  |  89<H>  ----------------------------<  96  4.5   |  20<L>  |  6.14<H>    Ca    9.0      23 Dec 2018 05:35  Phos  3.3     12-23  Mg     2.1     12-23    TPro  5.7<L>  /  Alb  2.9<L>  /  TBili  0.1<L>  /  DBili  x   /  AST  13  /  ALT  8<L>  /  AlkPhos  85  12-23    PT/INR - ( 23 Dec 2018 05:35 )   PT: 11.2 sec;   INR: 0.97 ratio         PTT - ( 23 Dec 2018 05:35 )  PTT:28.7 sec            PLAN:      MARILY Jean Baptiste [id number]

## 2018-12-24 NOTE — CONSULT NOTE ADULT - REASON FOR ADMISSION
Worsening creatinine with suprapubic fullness

## 2018-12-24 NOTE — PROGRESS NOTE ADULT - SUBJECTIVE AND OBJECTIVE BOX
[facility]   [phone]  RENAL FOLLOW UP NOTE  --------------------------------------------------------------------------------  Chief Complaint:    24 hour events/subjective:    seen limited english but states feels better overall     PAST HISTORY  --------------------------------------------------------------------------------  No significant changes to PMH, PSH, FHx, SHx, unless otherwise noted    ALLERGIES & MEDICATIONS  --------------------------------------------------------------------------------  Allergies    No Known Allergies    Intolerances      Standing Inpatient Medications  epoetin marimar Injectable 31113 Unit(s) IV Push <User Schedule>  ferrous    sulfate 325 milliGRAM(s) Oral two times a day  influenza   Vaccine 0.5 milliLiter(s) IntraMuscular once  levothyroxine 75 MICROGram(s) Oral daily  sodium bicarbonate 650 milliGRAM(s) Oral four times a day  tamsulosin 0.4 milliGRAM(s) Oral at bedtime    PRN Inpatient Medications  acetaminophen   Tablet .. 650 milliGRAM(s) Oral every 6 hours PRN      REVIEW OF SYSTEMS  --------------------------------------------------------------------------------    Gen: denies /chills, appetite improving   CVS: denies chest pain/palpitations  Resp: denies SOB/Cough  GI: Denies N/V/Abd pain  : Denies dysuria    All other systems were reviewed and are negative, except as noted.    VITALS/PHYSICAL EXAM  --------------------------------------------------------------------------------  T(C): 36.9 (12-24-18 @ 03:00), Max: 37.2 (12-23-18 @ 23:00)  HR: 97 (12-24-18 @ 14:00) (82 - 112)  BP: 117/58 (12-24-18 @ 14:00) (99/39 - 125/67)  RR: 20 (12-24-18 @ 14:00) (13 - 22)  SpO2: 99% (12-24-18 @ 14:00) (97% - 100%)  Wt(kg): --        12-23-18 @ 07:01  -  12-24-18 @ 07:00  --------------------------------------------------------  IN: 630 mL / OUT: 2200 mL / NET: -1570 mL    12-24-18 @ 07:01  -  12-24-18 @ 15:23  --------------------------------------------------------  IN: 480 mL / OUT: 850 mL / NET: -370 mL      Physical Exam:  	  	  Gen: alert oriented place pale appearing. speaks limited english   	Pulm: Decreased breath sounds b/l bases. no rales or ronchi or wheezing  	CV: RRR, S1/S2. no rub  	Back: No CVA tenderness  	Abd: +BS, soft, nontender/nondistended  	: No suprapubic tenderness.               Extremity: No cyanosis, no edema no clubbing  				      LABS/STUDIES  --------------------------------------------------------------------------------              7.8    7.6   >-----------<  251      [12-24-18 @ 04:29]              23.1     138  |  102  |  81  ----------------------------<  116      [12-24-18 @ 04:29]  4.6   |  21  |  5.40        Ca     8.9     [12-24-18 @ 04:29]      Mg     1.9     [12-24-18 @ 04:29]      Phos  2.5     [12-24-18 @ 04:29]    TPro  5.7  /  Alb  3.0  /  TBili  0.2  /  DBili  x   /  AST  11  /  ALT  8   /  AlkPhos  83  [12-24-18 @ 04:29]    PT/INR: PT 12.0 , INR 1.05       [12-24-18 @ 04:29]  PTT: 30.4       [12-24-18 @ 04:29]          [12-23-18 @ 05:35]    Creatinine Trend:  SCr 5.40 [12-24 @ 04:29]  SCr 6.14 [12-23 @ 05:35]  SCr 6.70 [12-22 @ 05:48]  SCr 6.99 [12-21 @ 15:20]  SCr 7.09 [12-21 @ 09:10]              Urinalysis - [12-19-18 @ 15:45]      Color Colorless / Appearance Clear / SG 1.012 / pH 6.5      Gluc Negative / Ketone Negative  / Bili Negative / Urobili Negative       Blood Negative / Protein 30 mg/dL / Leuk Est Negative / Nitrite Negative      RBC 5 / WBC 1 / Hyaline 1 / Gran  / Sq Epi  / Non Sq Epi 0 / Bacteria Negative    Urine Creatinine 38      [12-24-18 @ 07:59]  Urine Protein 64      [12-24-18 @ 07:59]    Iron 11, TIBC 157, %sat 7      [12-21-18 @ 17:25]  Ferritin 223      [12-24-18 @ 08:23]  TSH 8.92      [12-20-18 @ 08:18]    DESTINY: titer Negative, pattern --      [12-20-18 @ 13:26]  C3 Complement 99      [12-20-18 @ 13:26]  C4 Complement 32      [12-20-18 @ 13:26]  Immunofixation Serum:   No Monoclonal Band Identified      [12-20-18 @ 13:26]  SPEP Interpretation: Normal Electrophoresis Pattern      [12-20-18 @ 13:26]

## 2018-12-25 LAB
ALBUMIN SERPL ELPH-MCNC: 3.2 G/DL — LOW (ref 3.3–5)
ALP SERPL-CCNC: 97 U/L — SIGNIFICANT CHANGE UP (ref 40–120)
ALT FLD-CCNC: 14 U/L — SIGNIFICANT CHANGE UP (ref 10–45)
ANION GAP SERPL CALC-SCNC: 16 MMOL/L — SIGNIFICANT CHANGE UP (ref 5–17)
APTT BLD: 29.5 SEC — SIGNIFICANT CHANGE UP (ref 27.5–36.3)
AST SERPL-CCNC: 12 U/L — SIGNIFICANT CHANGE UP (ref 10–40)
BASOPHILS # BLD AUTO: 0 K/UL — SIGNIFICANT CHANGE UP (ref 0–0.2)
BASOPHILS NFR BLD AUTO: 0.3 % — SIGNIFICANT CHANGE UP (ref 0–2)
BILIRUB SERPL-MCNC: 0.2 MG/DL — SIGNIFICANT CHANGE UP (ref 0.2–1.2)
BUN SERPL-MCNC: 77 MG/DL — HIGH (ref 7–23)
CALCIUM SERPL-MCNC: 8.8 MG/DL — SIGNIFICANT CHANGE UP (ref 8.4–10.5)
CHLORIDE SERPL-SCNC: 101 MMOL/L — SIGNIFICANT CHANGE UP (ref 96–108)
CO2 SERPL-SCNC: 21 MMOL/L — LOW (ref 22–31)
CREAT SERPL-MCNC: 5.15 MG/DL — HIGH (ref 0.5–1.3)
EOSINOPHIL # BLD AUTO: 0.4 K/UL — SIGNIFICANT CHANGE UP (ref 0–0.5)
EOSINOPHIL NFR BLD AUTO: 3.7 % — SIGNIFICANT CHANGE UP (ref 0–6)
GLUCOSE SERPL-MCNC: 136 MG/DL — HIGH (ref 70–99)
HCT VFR BLD CALC: 25.2 % — LOW (ref 39–50)
HGB BLD-MCNC: 8 G/DL — LOW (ref 13–17)
INR BLD: 1.03 RATIO — SIGNIFICANT CHANGE UP (ref 0.88–1.16)
LYMPHOCYTES # BLD AUTO: 1.7 K/UL — SIGNIFICANT CHANGE UP (ref 1–3.3)
LYMPHOCYTES # BLD AUTO: 18.1 % — SIGNIFICANT CHANGE UP (ref 13–44)
MAGNESIUM SERPL-MCNC: 2.1 MG/DL — SIGNIFICANT CHANGE UP (ref 1.6–2.6)
MCHC RBC-ENTMCNC: 29.1 PG — SIGNIFICANT CHANGE UP (ref 27–34)
MCHC RBC-ENTMCNC: 31.9 GM/DL — LOW (ref 32–36)
MCV RBC AUTO: 91 FL — SIGNIFICANT CHANGE UP (ref 80–100)
MONOCYTES # BLD AUTO: 0.9 K/UL — SIGNIFICANT CHANGE UP (ref 0–0.9)
MONOCYTES NFR BLD AUTO: 10 % — SIGNIFICANT CHANGE UP (ref 2–14)
NEUTROPHILS # BLD AUTO: 6.4 K/UL — SIGNIFICANT CHANGE UP (ref 1.8–7.4)
NEUTROPHILS NFR BLD AUTO: 67.8 % — SIGNIFICANT CHANGE UP (ref 43–77)
PHOSPHATE SERPL-MCNC: 1.8 MG/DL — LOW (ref 2.5–4.5)
PLATELET # BLD AUTO: 317 K/UL — SIGNIFICANT CHANGE UP (ref 150–400)
POTASSIUM SERPL-MCNC: 4.4 MMOL/L — SIGNIFICANT CHANGE UP (ref 3.5–5.3)
POTASSIUM SERPL-SCNC: 4.4 MMOL/L — SIGNIFICANT CHANGE UP (ref 3.5–5.3)
PROT SERPL-MCNC: 5.9 G/DL — LOW (ref 6–8.3)
PROTHROM AB SERPL-ACNC: 11.8 SEC — SIGNIFICANT CHANGE UP (ref 10–12.9)
RBC # BLD: 2.76 M/UL — LOW (ref 4.2–5.8)
RBC # FLD: 12.7 % — SIGNIFICANT CHANGE UP (ref 10.3–14.5)
SODIUM SERPL-SCNC: 138 MMOL/L — SIGNIFICANT CHANGE UP (ref 135–145)
WBC # BLD: 9.4 K/UL — SIGNIFICANT CHANGE UP (ref 3.8–10.5)
WBC # FLD AUTO: 9.4 K/UL — SIGNIFICANT CHANGE UP (ref 3.8–10.5)

## 2018-12-25 PROCEDURE — 93306 TTE W/DOPPLER COMPLETE: CPT | Mod: 26

## 2018-12-25 RX ORDER — HEPARIN SODIUM 5000 [USP'U]/ML
5000 INJECTION INTRAVENOUS; SUBCUTANEOUS EVERY 12 HOURS
Qty: 0 | Refills: 0 | Status: DISCONTINUED | OUTPATIENT
Start: 2018-12-25 | End: 2018-12-29

## 2018-12-25 RX ADMIN — Medication 650 MILLIGRAM(S): at 22:59

## 2018-12-25 RX ADMIN — PANTOPRAZOLE SODIUM 40 MILLIGRAM(S): 20 TABLET, DELAYED RELEASE ORAL at 06:13

## 2018-12-25 RX ADMIN — Medication 650 MILLIGRAM(S): at 06:13

## 2018-12-25 RX ADMIN — TAMSULOSIN HYDROCHLORIDE 0.4 MILLIGRAM(S): 0.4 CAPSULE ORAL at 22:58

## 2018-12-25 RX ADMIN — Medication 650 MILLIGRAM(S): at 17:35

## 2018-12-25 RX ADMIN — Medication 75 MICROGRAM(S): at 06:13

## 2018-12-25 RX ADMIN — Medication 1 MILLIGRAM(S): at 11:20

## 2018-12-25 RX ADMIN — HEPARIN SODIUM 5000 UNIT(S): 5000 INJECTION INTRAVENOUS; SUBCUTANEOUS at 18:37

## 2018-12-25 RX ADMIN — Medication 650 MILLIGRAM(S): at 11:20

## 2018-12-25 RX ADMIN — Medication 325 MILLIGRAM(S): at 17:34

## 2018-12-25 RX ADMIN — Medication 325 MILLIGRAM(S): at 06:13

## 2018-12-25 NOTE — PROGRESS NOTE ADULT - ASSESSMENT
gastric cancer-s/p nontraditional treatment -bilateral leg  edema-s/p pericardial effusion-drain placed-renal  failure-stella on atn/obst  uropathy    Pericardial drain removed. TTE acceptable. Full Echo in AM. Keep ayala in place. STELLA appears to be recovering, creatinine 5.20.   Continue on tele for now. gastric cancer-s/p nontraditional treatment -bilateral leg  edema-s/p pericardial effusion-drain placed-renal  failure-stella on atn/obst  uropathy    Pericardial drain removed. TTE acceptable. Full Echo in AM. Keep ayala in place. STELLA appears to be recovering, creatinine 5.20.   Continue on tele for now.  Continue NAHCO3 650 mg QID as serum bicarb 21, at goal. Continue Flomax .4 mg/day for BPH,   Synthyroid 75 mcg/day for hypothyroidism and Ferrous sulfate 325 mg bid for iron deficiency anemia.  SMA_7 daily.   Nephrology consult reviewed and appreciated.

## 2018-12-25 NOTE — PROGRESS NOTE ADULT - SUBJECTIVE AND OBJECTIVE BOX
INTERVAL HPI/OVERNIGHT EVENTS:  Pt seen and examined at bedside.     Allergies/Intolerance: No Known Allergies      MEDICATIONS  (STANDING):  epoetin marimar Injectable 02938 Unit(s) IV Push <User Schedule>  ferrous    sulfate 325 milliGRAM(s) Oral two times a day  folic acid 1 milliGRAM(s) Oral daily  influenza   Vaccine 0.5 milliLiter(s) IntraMuscular once  levothyroxine 75 MICROGram(s) Oral daily  pantoprazole    Tablet 40 milliGRAM(s) Oral before breakfast  sodium bicarbonate 650 milliGRAM(s) Oral four times a day  tamsulosin 0.4 milliGRAM(s) Oral at bedtime    MEDICATIONS  (PRN):  acetaminophen   Tablet .. 650 milliGRAM(s) Oral every 6 hours PRN Mild Pain (1 - 3)        ROS: all systems reviewed and wnl      PHYSICAL EXAMINATION:  Vital Signs Last 24 Hrs  T(C): 36.4 (25 Dec 2018 12:18), Max: 37.3 (24 Dec 2018 19:00)  T(F): 97.5 (25 Dec 2018 12:18), Max: 99.1 (24 Dec 2018 19:00)  HR: 86 (25 Dec 2018 12:18) (86 - 102)  BP: 120/67 (25 Dec 2018 12:18) (97/61 - 150/74)  BP(mean): 77 (24 Dec 2018 21:00) (65 - 77)  RR: 18 (25 Dec 2018 12:18) (18 - 29)  SpO2: 96% (25 Dec 2018 12:18) (96% - 100%)  CAPILLARY BLOOD GLUCOSE          12-24 @ 07:01  -  12-25 @ 07:00  --------------------------------------------------------  IN: 1095 mL / OUT: 2315 mL / NET: -1220 mL        GENERAL:   NECK: supple, No JVD  CHEST/LUNG: clear to auscultation bilaterally; no rales, rhonchi, or wheezing b/l  HEART: normal S1, S2  ABDOMEN: BS+, soft, ND, NT   EXTREMITIES:  pulses palpable; no clubbing, cyanosis, or edema b/l LEs  SKIN: no rashes or lesions      LABS:                        8.0    9.4   )-----------( 317      ( 25 Dec 2018 07:09 )             25.2     12-25    138  |  101  |  77<H>  ----------------------------<  136<H>  4.4   |  21<L>  |  5.15<H>    Ca    8.8      25 Dec 2018 07:09  Phos  1.8     12-25  Mg     2.1     12-25    TPro  5.9<L>  /  Alb  3.2<L>  /  TBili  0.2  /  DBili  x   /  AST  12  /  ALT  14  /  AlkPhos  97  12-25    PT/INR - ( 25 Dec 2018 07:09 )   PT: 11.8 sec;   INR: 1.03 ratio         PTT - ( 25 Dec 2018 07:09 )  PTT:29.5 sec INTERVAL HPI/OVERNIGHT EVENTS:  Pt seen and examined at bedside.     Allergies/Intolerance: No Known Allergies      MEDICATIONS  (STANDING):  epoetin marimar Injectable 50499 Unit(s) IV Push <User Schedule>  ferrous    sulfate 325 milliGRAM(s) Oral two times a day  folic acid 1 milliGRAM(s) Oral daily  influenza   Vaccine 0.5 milliLiter(s) IntraMuscular once  levothyroxine 75 MICROGram(s) Oral daily  pantoprazole    Tablet 40 milliGRAM(s) Oral before breakfast  sodium bicarbonate 650 milliGRAM(s) Oral four times a day  tamsulosin 0.4 milliGRAM(s) Oral at bedtime    MEDICATIONS  (PRN):  acetaminophen   Tablet .. 650 milliGRAM(s) Oral every 6 hours PRN Mild Pain (1 - 3)        ROS: all systems reviewed and wnl      PHYSICAL EXAMINATION:  Vital Signs Last 24 Hrs  T(C): 36.4 (25 Dec 2018 12:18), Max: 37.3 (24 Dec 2018 19:00)  T(F): 97.5 (25 Dec 2018 12:18), Max: 99.1 (24 Dec 2018 19:00)  HR: 86 (25 Dec 2018 12:18) (86 - 102)  BP: 120/67 (25 Dec 2018 12:18) (97/61 - 150/74)  BP(mean): 77 (24 Dec 2018 21:00) (65 - 77)  RR: 18 (25 Dec 2018 12:18) (18 - 29)  SpO2: 96% (25 Dec 2018 12:18) (96% - 100%)  CAPILLARY BLOOD GLUCOSE          12-24 @ 07:01  -  12-25 @ 07:00  --------------------------------------------------------  IN: 1095 mL / OUT: 2315 mL / NET: -1220 mL        GENERAL: asleep in bed, NAD, no SOB or fevers    NECK: supple, No JVD  CHEST/LUNG: clear to auscultation bilaterally; no rales, rhonchi, or wheezing b/l  HEART: normal S1, S2  ABDOMEN: BS+, soft, ND, NT   EXTREMITIES:  pulses palpable; no clubbing, cyanosis, or edema b/l LEs  SKIN: no rashes or lesions      LABS:                        8.0    9.4   )-----------( 317      ( 25 Dec 2018 07:09 )             25.2     12-25    138  |  101  |  77<H>  ----------------------------<  136<H>  4.4   |  21<L>  |  5.15<H>    Ca    8.8      25 Dec 2018 07:09  Phos  1.8     12-25  Mg     2.1     12-25    TPro  5.9<L>  /  Alb  3.2<L>  /  TBili  0.2  /  DBili  x   /  AST  12  /  ALT  14  /  AlkPhos  97  12-25    PT/INR - ( 25 Dec 2018 07:09 )   PT: 11.8 sec;   INR: 1.03 ratio         PTT - ( 25 Dec 2018 07:09 )  PTT:29.5 sec

## 2018-12-25 NOTE — PROGRESS NOTE ADULT - ASSESSMENT
84 y/o M-patient apparently with a history of gastric CA  non chemo treated with STELLA obstructive uropathy with hyperkalemia on recent blood work with pericardial tamponade on echo       1- STELLA   2- pericardial tamponade/effusion  3- obstructive uropathy  4- anemia     s/p pericardiocentesis  cont ayala  cr improving  has good uo.   procrit 89205 units tiw   suspect stella in setting of ATN due to decrease cardiac outpt due to pericardial effusion as well as urinary retention likely   d/w family at bedside

## 2018-12-25 NOTE — PROGRESS NOTE ADULT - ASSESSMENT
84 yo man with  history of gastric CA  has never tx'd with chemotherapy and  was sent to ER for staging. CT showed large pericardial effusion. Pt is now in PICU with a drain in chest draining pericardial fluid.    12-21-18: R/O iron deficiency: check ferritin, b12/ folate    12-22-18: received Venofer this morning. Persistent odynophagia. await pathology of pericardial fluid.    12-23-18: No more odynophagia.. HCT decreased. Repeat Ferritin. Check B12/ Folate. If Ferritin is adequate, Pt will be given Procrit.    12-24-18; Ferritin > 220,  folate 5.2, B12 > 880. on Procrit, Will start Folate 1 mg QD    12-25-18: Pt looks better. Cytology of pericardial fluid is still pending. Byrnes in place. On Ferrous sulfate, Folate and Procrit.

## 2018-12-25 NOTE — PROGRESS NOTE ADULT - SUBJECTIVE AND OBJECTIVE BOX
[facility]   [phone]  RENAL FOLLOW UP NOTE  --------------------------------------------------------------------------------  Chief Complaint:    24 hour events/subjective:    seen . states feels better overall   family at bedside     PAST HISTORY  --------------------------------------------------------------------------------  No significant changes to PMH, PSH, FHx, SHx, unless otherwise noted    ALLERGIES & MEDICATIONS  --------------------------------------------------------------------------------  Allergies    No Known Allergies    Intolerances      Standing Inpatient Medications  epoetin marimar Injectable 83768 Unit(s) IV Push <User Schedule>  ferrous    sulfate 325 milliGRAM(s) Oral two times a day  folic acid 1 milliGRAM(s) Oral daily  influenza   Vaccine 0.5 milliLiter(s) IntraMuscular once  levothyroxine 75 MICROGram(s) Oral daily  pantoprazole    Tablet 40 milliGRAM(s) Oral before breakfast  sodium bicarbonate 650 milliGRAM(s) Oral four times a day  tamsulosin 0.4 milliGRAM(s) Oral at bedtime    PRN Inpatient Medications  acetaminophen   Tablet .. 650 milliGRAM(s) Oral every 6 hours PRN      REVIEW OF SYSTEMS  --------------------------------------------------------------------------------    Gen: denies fevers/chills,  CVS: denies chest pain/palpitations  Resp: denies SOB/Cough  GI: Denies N/V/Abd pain  : Denies dysuria    All other systems were reviewed and are negative, except as noted.    VITALS/PHYSICAL EXAM  --------------------------------------------------------------------------------  T(C): 36.7 (12-25-18 @ 04:10), Max: 37.3 (12-24-18 @ 19:00)  HR: 102 (12-25-18 @ 04:10) (87 - 102)  BP: 113/63 (12-25-18 @ 04:10) (97/61 - 150/74)  RR: 18 (12-25-18 @ 04:10) (18 - 29)  SpO2: 98% (12-25-18 @ 04:10) (97% - 100%)  Wt(kg): --        12-24-18 @ 07:01  -  12-25-18 @ 07:00  --------------------------------------------------------  IN: 1095 mL / OUT: 2315 mL / NET: -1220 mL      Physical Exam:  	  	  Gen: alert oriented place pale appearing. speaks limited english   	Pulm: Decreased breath sounds b/l bases. no rales or ronchi or wheezing  	CV: RRR, S1/S2. no rub  	Back: No CVA tenderness  	Abd: +BS, soft, nontender/nondistended  	: No suprapubic tenderness.               Extremity: No cyanosis, no edema no clubbing  	  LABS/STUDIES  --------------------------------------------------------------------------------              8.0    9.4   >-----------<  317      [12-25-18 @ 07:09]              25.2     138  |  101  |  77  ----------------------------<  136      [12-25-18 @ 07:09]  4.4   |  21  |  5.15        Ca     8.8     [12-25-18 @ 07:09]      Mg     2.1     [12-25-18 @ 07:09]      Phos  1.8     [12-25-18 @ 07:09]    TPro  5.9  /  Alb  3.2  /  TBili  0.2  /  DBili  x   /  AST  12  /  ALT  14  /  AlkPhos  97  [12-25-18 @ 07:09]    PT/INR: PT 11.8 , INR 1.03       [12-25-18 @ 07:09]  PTT: 29.5       [12-25-18 @ 07:09]      Creatinine Trend:  SCr 5.15 [12-25 @ 07:09]  SCr 5.40 [12-24 @ 04:29]  SCr 6.14 [12-23 @ 05:35]  SCr 6.70 [12-22 @ 05:48]  SCr 6.99 [12-21 @ 15:20]              Urinalysis - [12-19-18 @ 15:45]      Color Colorless / Appearance Clear / SG 1.012 / pH 6.5      Gluc Negative / Ketone Negative  / Bili Negative / Urobili Negative       Blood Negative / Protein 30 mg/dL / Leuk Est Negative / Nitrite Negative      RBC 5 / WBC 1 / Hyaline 1 / Gran  / Sq Epi  / Non Sq Epi 0 / Bacteria Negative    Urine Creatinine 38      [12-24-18 @ 07:59]  Urine Protein 64      [12-24-18 @ 07:59]    Iron 11, TIBC 157, %sat 7      [12-21-18 @ 17:25]  Ferritin 223      [12-24-18 @ 08:23]  TSH 8.92      [12-20-18 @ 08:18]    DESTINY: titer Negative, pattern --      [12-20-18 @ 13:26]  C3 Complement 99      [12-20-18 @ 13:26]  C4 Complement 32      [12-20-18 @ 13:26]  Immunofixation Serum:   No Monoclonal Band Identified      [12-20-18 @ 13:26]  SPEP Interpretation: Normal Electrophoresis Pattern      [12-20-18 @ 13:26]

## 2018-12-25 NOTE — PROGRESS NOTE ADULT - SUBJECTIVE AND OBJECTIVE BOX
Patient is a 85y old  Male who presents with a chief complaint of Worsening creatinine with suprapubic fullness (25 Dec 2018 13:10)       Pt is seen and examined  pt is awake and sitting up in bed  pt seems comfortable     MEDICATIONS  (STANDING):  epoetin marimar Injectable 99321 Unit(s) IV Push <User Schedule>  ferrous    sulfate 325 milliGRAM(s) Oral two times a day  folic acid 1 milliGRAM(s) Oral daily  heparin  Injectable 5000 Unit(s) SubCutaneous every 12 hours  influenza   Vaccine 0.5 milliLiter(s) IntraMuscular once  levothyroxine 75 MICROGram(s) Oral daily  pantoprazole    Tablet 40 milliGRAM(s) Oral before breakfast  sodium bicarbonate 650 milliGRAM(s) Oral four times a day  tamsulosin 0.4 milliGRAM(s) Oral at bedtime    MEDICATIONS  (PRN):  acetaminophen   Tablet .. 650 milliGRAM(s) Oral every 6 hours PRN Mild Pain (1 - 3)      Allergies    No Known Allergies    Intolerances        Vital Signs Last 24 Hrs  T(C): 36.4 (25 Dec 2018 12:18), Max: 36.7 (24 Dec 2018 22:00)  T(F): 97.5 (25 Dec 2018 12:18), Max: 98.1 (24 Dec 2018 22:00)  HR: 86 (25 Dec 2018 12:18) (86 - 102)  BP: 120/67 (25 Dec 2018 12:18) (113/63 - 150/74)  BP(mean): 77 (24 Dec 2018 21:00) (74 - 77)  RR: 18 (25 Dec 2018 12:18) (18 - 24)  SpO2: 96% (25 Dec 2018 12:18) (96% - 100%)        LABS:                          8.0    9.4   )-----------( 317      ( 25 Dec 2018 07:09 )             25.2         Mean Cell Volume : 91.0 fl  Mean Cell Hemoglobin : 29.1 pg  Mean Cell Hemoglobin Concentration : 31.9 gm/dL  Auto Neutrophil # : 6.4 K/uL  Auto Lymphocyte # : 1.7 K/uL  Auto Monocyte # : 0.9 K/uL  Auto Eosinophil # : 0.4 K/uL  Auto Basophil # : 0.0 K/uL  Auto Neutrophil % : 67.8 %  Auto Lymphocyte % : 18.1 %  Auto Monocyte % : 10.0 %  Auto Eosinophil % : 3.7 %  Auto Basophil % : 0.3 %    Serial CBC's  12-25 @ 07:09  Hct-25.2 / Hgb-8.0 / Plat-317 / RBC-2.76 / WBC-9.4          Serial CBC's  12-24 @ 04:29  Hct-23.1 / Hgb-7.8 / Plat-251 / RBC-2.54 / WBC-7.6          Serial CBC's  12-23 @ 05:35  Hct-22.7 / Hgb-7.8 / Plat-246 / RBC-2.47 / WBC-7.1          Serial CBC's  12-22 @ 05:48  Hct-25.0 / Hgb-8.6 / Plat-244 / RBC-2.73 / WBC-8.5            12-25    138  |  101  |  77<H>  ----------------------------<  136<H>  4.4   |  21<L>  |  5.15<H>    Ca    8.8      25 Dec 2018 07:09  Phos  1.8     12-25  Mg     2.1     12-25    TPro  5.9<L>  /  Alb  3.2<L>  /  TBili  0.2  /  DBili  x   /  AST  12  /  ALT  14  /  AlkPhos  97  12-25      PT/INR - ( 25 Dec 2018 07:09 )   PT: 11.8 sec;   INR: 1.03 ratio         PTT - ( 25 Dec 2018 07:09 )  PTT:29.5 sec    WBC Count: 9.4 K/uL (12-25-18 @ 07:09)  Hemoglobin: 8.0 g/dL (12-25-18 @ 07:09)  Hematocrit: 25.2 % (12-25-18 @ 07:09)  Platelet Count - Automated: 317 K/uL (12-25-18 @ 07:09)  Ferritin, Serum: 223 ng/mL (12-24-18 @ 08:23)  Folate, Serum: 5.2 ng/mL (12-24-18 @ 08:23)  Vitamin B12, Serum: 887 pg/mL (12-24-18 @ 08:23)  WBC Count: 7.6 K/uL (12-24-18 @ 04:29)  Hemoglobin: 7.8 g/dL (12-24-18 @ 04:29)  Hematocrit: 23.1 % (12-24-18 @ 04:29)  Platelet Count - Automated: 251 K/uL (12-24-18 @ 04:29)  WBC Count: 7.1 K/uL (12-23-18 @ 05:35)  Hemoglobin: 7.8 g/dL (12-23-18 @ 05:35)  Hematocrit: 22.7 % (12-23-18 @ 05:35)  Platelet Count - Automated: 246 K/uL (12-23-18 @ 05:35)  WBC Count: 8.5 K/uL (12-22-18 @ 05:48)  Hemoglobin: 8.6 g/dL (12-22-18 @ 05:48)  Hematocrit: 25.0 % (12-22-18 @ 05:48)  Platelet Count - Automated: 244 K/uL (12-22-18 @ 05:48)  Ferritin, Serum: 103 ng/mL (12-21-18 @ 17:25)  Iron - Total Binding Capacity.: 157 ug/dL (12-21-18 @ 17:25)  WBC Count: 9.0 K/uL (12-21-18 @ 15:18)  Hemoglobin: 7.4 g/dL (12-21-18 @ 15:18)  Hematocrit: 21.7 % (12-21-18 @ 15:18)  Platelet Count - Automated: 205 K/uL (12-21-18 @ 15:18)  WBC Count: 8.6 K/uL (12-21-18 @ 09:10)  Hemoglobin: 7.6 g/dL (12-21-18 @ 09:10)  Hematocrit: 23.5 % (12-21-18 @ 09:10)  Platelet Count - Automated: 212 K/uL (12-21-18 @ 09:10)  WBC Count: 8.4 K/uL (12-21-18 @ 04:21)  Hemoglobin: 7.7 g/dL (12-21-18 @ 04:21)  Hematocrit: 21.5 % (12-21-18 @ 04:21)  Platelet Count - Automated: 203 K/uL (12-21-18 @ 04:21)  WBC Count: 8.19 K/uL (12-20-18 @ 08:14)  Hemoglobin: 8.3 g/dL (12-20-18 @ 08:14)  Hematocrit: 25.9 % (12-20-18 @ 08:14)  Platelet Count - Automated: 198 K/uL (12-20-18 @ 08:14)  Hemoglobin: 8.5 g/dL (12-19-18 @ 15:21)  Platelet Count - Automated: 224 K/uL (12-19-18 @ 15:21)  Hematocrit: 24.0 % (12-19-18 @ 15:21)  WBC Count: 8.1 K/uL (12-19-18 @ 15:21)      Serum Protein Electrophoresis Interp: Normal Electrophoresis Pattern (12-20 @ 13:26)  Immunofixation, Serum:   No Monoclonal Band Identified (12-20 @ 13:26)                  RADIOLOGY & ADDITIONAL STUDIES:

## 2018-12-25 NOTE — PROVIDER CONTACT NOTE (OTHER) - ASSESSMENT
pt & family a&Ox4, pt and family educated about risks and benefits of heparin. family provided with information about medication

## 2018-12-25 NOTE — PROGRESS NOTE ADULT - SUBJECTIVE AND OBJECTIVE BOX
INTERVAL HPI/OVERNIGHT EVENTS:  Pt seen and examined at bedside.     Allergies/Intolerance: No Known Allergies      MEDICATIONS  (STANDING):  epoetin marimar Injectable 91573 Unit(s) IV Push <User Schedule>  ferrous    sulfate 325 milliGRAM(s) Oral two times a day  folic acid 1 milliGRAM(s) Oral daily  influenza   Vaccine 0.5 milliLiter(s) IntraMuscular once  levothyroxine 75 MICROGram(s) Oral daily  pantoprazole    Tablet 40 milliGRAM(s) Oral before breakfast  sodium bicarbonate 650 milliGRAM(s) Oral four times a day  tamsulosin 0.4 milliGRAM(s) Oral at bedtime    MEDICATIONS  (PRN):  acetaminophen   Tablet .. 650 milliGRAM(s) Oral every 6 hours PRN Mild Pain (1 - 3)        ROS: all systems reviewed and wnl      PHYSICAL EXAMINATION:  Vital Signs Last 24 Hrs  T(C): 36.4 (25 Dec 2018 12:18), Max: 37.3 (24 Dec 2018 19:00)  T(F): 97.5 (25 Dec 2018 12:18), Max: 99.1 (24 Dec 2018 19:00)  HR: 86 (25 Dec 2018 12:18) (86 - 102)  BP: 120/67 (25 Dec 2018 12:18) (97/61 - 150/74)  BP(mean): 77 (24 Dec 2018 21:00) (65 - 77)  RR: 18 (25 Dec 2018 12:18) (18 - 29)  SpO2: 96% (25 Dec 2018 12:18) (96% - 100%)  CAPILLARY BLOOD GLUCOSE          12-24 @ 07:01  -  12-25 @ 07:00  --------------------------------------------------------  IN: 1095 mL / OUT: 2315 mL / NET: -1220 mL        GENERAL:   NECK: supple, No JVD  CHEST/LUNG: clear to auscultation bilaterally; no rales, rhonchi, or wheezing b/l  HEART: normal S1, S2  ABDOMEN: BS+, soft, ND, NT   EXTREMITIES:  pulses palpable; no clubbing, cyanosis, or edema b/l LEs  SKIN: no rashes or lesions      LABS:                        8.0    9.4   )-----------( 317      ( 25 Dec 2018 07:09 )             25.2     12-25    138  |  101  |  77<H>  ----------------------------<  136<H>  4.4   |  21<L>  |  5.15<H>    Ca    8.8      25 Dec 2018 07:09  Phos  1.8     12-25  Mg     2.1     12-25    TPro  5.9<L>  /  Alb  3.2<L>  /  TBili  0.2  /  DBili  x   /  AST  12  /  ALT  14  /  AlkPhos  97  12-25    PT/INR - ( 25 Dec 2018 07:09 )   PT: 11.8 sec;   INR: 1.03 ratio         PTT - ( 25 Dec 2018 07:09 )  PTT:29.5 sec

## 2018-12-26 DIAGNOSIS — N17.9 ACUTE KIDNEY FAILURE, UNSPECIFIED: ICD-10-CM

## 2018-12-26 DIAGNOSIS — E78.5 HYPERLIPIDEMIA, UNSPECIFIED: ICD-10-CM

## 2018-12-26 DIAGNOSIS — I31.3 PERICARDIAL EFFUSION (NONINFLAMMATORY): ICD-10-CM

## 2018-12-26 DIAGNOSIS — E03.9 HYPOTHYROIDISM, UNSPECIFIED: ICD-10-CM

## 2018-12-26 LAB
ANION GAP SERPL CALC-SCNC: 16 MMOL/L — SIGNIFICANT CHANGE UP (ref 5–17)
BUN SERPL-MCNC: 81 MG/DL — HIGH (ref 7–23)
CALCIUM SERPL-MCNC: 8.8 MG/DL — SIGNIFICANT CHANGE UP (ref 8.4–10.5)
CHLORIDE SERPL-SCNC: 100 MMOL/L — SIGNIFICANT CHANGE UP (ref 96–108)
CO2 SERPL-SCNC: 21 MMOL/L — LOW (ref 22–31)
CREAT SERPL-MCNC: 4.98 MG/DL — HIGH (ref 0.5–1.3)
GLUCOSE SERPL-MCNC: 121 MG/DL — HIGH (ref 70–99)
HCT VFR BLD CALC: 25.1 % — LOW (ref 39–50)
HGB BLD-MCNC: 8.5 G/DL — LOW (ref 13–17)
MAGNESIUM SERPL-MCNC: 2.1 MG/DL — SIGNIFICANT CHANGE UP (ref 1.6–2.6)
MCHC RBC-ENTMCNC: 30.6 PG — SIGNIFICANT CHANGE UP (ref 27–34)
MCHC RBC-ENTMCNC: 34 GM/DL — SIGNIFICANT CHANGE UP (ref 32–36)
MCV RBC AUTO: 90.1 FL — SIGNIFICANT CHANGE UP (ref 80–100)
PHOSPHATE SERPL-MCNC: 2.5 MG/DL — SIGNIFICANT CHANGE UP (ref 2.5–4.5)
PLATELET # BLD AUTO: 327 K/UL — SIGNIFICANT CHANGE UP (ref 150–400)
POTASSIUM SERPL-MCNC: 4.6 MMOL/L — SIGNIFICANT CHANGE UP (ref 3.5–5.3)
POTASSIUM SERPL-SCNC: 4.6 MMOL/L — SIGNIFICANT CHANGE UP (ref 3.5–5.3)
RBC # BLD: 2.79 M/UL — LOW (ref 4.2–5.8)
RBC # FLD: 13.1 % — SIGNIFICANT CHANGE UP (ref 10.3–14.5)
SODIUM SERPL-SCNC: 137 MMOL/L — SIGNIFICANT CHANGE UP (ref 135–145)
WBC # BLD: 9.2 K/UL — SIGNIFICANT CHANGE UP (ref 3.8–10.5)
WBC # FLD AUTO: 9.2 K/UL — SIGNIFICANT CHANGE UP (ref 3.8–10.5)

## 2018-12-26 PROCEDURE — 99233 SBSQ HOSP IP/OBS HIGH 50: CPT

## 2018-12-26 RX ORDER — SIMETHICONE 80 MG/1
80 TABLET, CHEWABLE ORAL EVERY 8 HOURS
Qty: 0 | Refills: 0 | Status: DISCONTINUED | OUTPATIENT
Start: 2018-12-26 | End: 2018-12-27

## 2018-12-26 RX ORDER — FERROUS SULFATE 325(65) MG
325 TABLET ORAL DAILY
Qty: 0 | Refills: 0 | Status: DISCONTINUED | OUTPATIENT
Start: 2018-12-26 | End: 2018-12-29

## 2018-12-26 RX ADMIN — Medication 1 MILLIGRAM(S): at 12:09

## 2018-12-26 RX ADMIN — Medication 75 MICROGRAM(S): at 06:14

## 2018-12-26 RX ADMIN — Medication 650 MILLIGRAM(S): at 19:20

## 2018-12-26 RX ADMIN — HEPARIN SODIUM 5000 UNIT(S): 5000 INJECTION INTRAVENOUS; SUBCUTANEOUS at 19:20

## 2018-12-26 RX ADMIN — TAMSULOSIN HYDROCHLORIDE 0.4 MILLIGRAM(S): 0.4 CAPSULE ORAL at 22:26

## 2018-12-26 RX ADMIN — Medication 650 MILLIGRAM(S): at 06:14

## 2018-12-26 RX ADMIN — Medication 650 MILLIGRAM(S): at 12:09

## 2018-12-26 RX ADMIN — Medication 325 MILLIGRAM(S): at 06:14

## 2018-12-26 RX ADMIN — PANTOPRAZOLE SODIUM 40 MILLIGRAM(S): 20 TABLET, DELAYED RELEASE ORAL at 06:14

## 2018-12-26 RX ADMIN — HEPARIN SODIUM 5000 UNIT(S): 5000 INJECTION INTRAVENOUS; SUBCUTANEOUS at 06:15

## 2018-12-26 RX ADMIN — Medication 325 MILLIGRAM(S): at 12:09

## 2018-12-26 RX ADMIN — ERYTHROPOIETIN 10000 UNIT(S): 10000 INJECTION, SOLUTION INTRAVENOUS; SUBCUTANEOUS at 06:14

## 2018-12-26 NOTE — PROGRESS NOTE ADULT - PROBLEM SELECTOR PROBLEM 5
Anemia in neoplastic disease
Goals of care, counseling/discussion
Malignant neoplasm of stomach, unspecified location
Anemia in neoplastic disease

## 2018-12-26 NOTE — PROGRESS NOTE ADULT - ASSESSMENT
gastric cancer-s/p nontraditional treatment -bilateral leg  edema-s/p pericardial effusion-drain placed-renal  failure-stella on atn/obst  uropathy    Pericardial drain removed. TTE acceptable, post-procedure pericardial effusion. No additional echo needed. Byrnes removed, doing well,   able to void. STELLA appears to be recovering, creatinine 4.98 from 7.09.  Stop tele. Continue NAHCO3 650 mg QID as serum bicarb 21, at goal. Continue Flomax .4 mg/day for BPH, Synthyroid 75 mcg/day for hypothyroidism and Ferrous sulfate 325 mg bid for iron deficiency anemia.  SMA_7 daily.   Nephrology consult reviewed and appreciated.    Discharge to home with home PT later in week.

## 2018-12-26 NOTE — PROGRESS NOTE ADULT - SUBJECTIVE AND OBJECTIVE BOX
SUBJECTIVE AND OBJECTIVE: complains of GERD and bitter taste in mouth    INTERVAL HPI: 84 y/o male Hx gastric CA not treated with chemotherapy, hypothyroidism, hyperlipidemia, STELLA obstructive uropathy with hyperkalemia on recent blood work, pericardial tamponade s/p pericardiocentesis. Pt also developed STELLA 2/2 BPH s/p Byrnes catheter removal today and voiding. Pt is waiting for cytology of pericardial fluid to r/o malignancy.     DNR on chart: no  Allergies    No Known Allergies    Intolerances    MEDICATIONS  (STANDING):  epoetin marimar Injectable 13784 Unit(s) IV Push <User Schedule>  ferrous    sulfate 325 milliGRAM(s) Oral daily  folic acid 1 milliGRAM(s) Oral daily  heparin  Injectable 5000 Unit(s) SubCutaneous every 12 hours  influenza   Vaccine 0.5 milliLiter(s) IntraMuscular once  levothyroxine 75 MICROGram(s) Oral daily  pantoprazole    Tablet 40 milliGRAM(s) Oral before breakfast  sodium bicarbonate 650 milliGRAM(s) Oral four times a day  tamsulosin 0.4 milliGRAM(s) Oral at bedtime    MEDICATIONS  (PRN):  acetaminophen   Tablet .. 650 milliGRAM(s) Oral every 6 hours PRN Mild Pain (1 - 3)  simethicone 80 milliGRAM(s) Chew every 8 hours PRN Indigestion    ITEMS UNCHECKED ARE NOT PRESENT    PRESENT SYMPTOMS: [ ]Unable to obtain due to poor mentation   Source if other than patient:  [ ]Family   [ ]Team     Pain (Impact on QOL):   Location:   Minimal acceptable level (0-10 scale):            Aggravating factors:  Quality:  Radiation:  Severity (0-10 scale):    Timing:    Dyspnea:                           [ ]Mild [ ]Moderate [ ]Severe  Anxiety:                             [ ]Mild [ ]Moderate [ ]Severe  Fatigue:                             [ ]Mild [ ]Moderate [ ]Severe  Nausea:                             [ ]Mild [ ]Moderate [ ]Severe  Loss of appetite:              [ ]Mild [ ]Moderate [ ]Severe  Constipation:                    [ ]Mild [ ]Moderate [ ]Severe    PAIN AD Score:	  [email or web address]/painad.[email or web address] (Ctrl + left click to view)    Other Symptoms: GERD, epigastric discomfort   [x]All other review of systems negative     Karnofsky Performance Score/Palliative Performance Status Version 2: 60 %    http://palliative.info/resource_material/[email or web address].pdf  PHYSICAL EXAM:  Vital Signs Last 24 Hrs  T(C): 36.8 (26 Dec 2018 13:00), Max: 37.1 (25 Dec 2018 21:25)  T(F): 98.2 (26 Dec 2018 13:00), Max: 98.8 (25 Dec 2018 21:25)  HR: 103 (26 Dec 2018 13:00) (77 - 103)  BP: 110/62 (26 Dec 2018 13:00) (110/62 - 126/65)  RR: 18 (26 Dec 2018 13:00) (18 - 18)  SpO2: 100% (26 Dec 2018 13:00) (96% - 100%) I&O's Summary    25 Dec 2018 07:01  -  26 Dec 2018 07:00  --------------------------------------------------------  IN: 1200 mL / OUT: 2325 mL / NET: -1125 mL    26 Dec 2018 07:01  -  26 Dec 2018 13:42  --------------------------------------------------------  IN: 260 mL / OUT: 275 mL / NET: -15 mL     GENERAL:  [x]Alert  [x]Oriented x 3  [ ]Lethargic  [ ]Cachexia  [ ]Unarousable  [x]Verbal  [ ]Non-Verbal    Behavioral:   [ ] Anxiety  [ ] Delirium [ ] Agitation [ ] Other    HEENT:  [x]Normal   [ ]Dry mouth   [ ]ET Tube/Trach  [ ]Oral lesions    PULMONARY:   [x]Clear [ ]Tachypnea  [ ]Audible excessive secretions   [ ]Rhonchi        [ ]Right [ ]Left [ ]Bilateral  [ ]Crackles        [ ]Right [ ]Left [ ]Bilateral  [ ]Wheezing     [ ]Right [ ]Left [ ]Bilateral    CARDIOVASCULAR:    [x]Regular [ ]Irregular [ ]Tachy  [ ]Sukhjinder [ ]Murmur [ ]Other    GASTROINTESTINAL:  [x]Soft  [ ]Distended   [x]+BS  [x]Non tender [ ]Tender  [ ]PEG [ ]OGT/ NGT   Last BM: 12/26   GENITOURINARY:  [x]Normal [ ] Incontinent   [ ]Oliguria/Anuria   [ ]Byrnes    MUSCULOSKELETAL:   [x]Normal   [ ]Weakness  [ ]Bed/Wheelchair bound [ ]Edema    NEUROLOGIC:   [x]No focal deficits  [ ] Cognitive impairment  [ ] Dysphagia [ ]Dysarthria [ ] Paresis [ ]Other     SKIN:   [x]Normal   [ ]Pressure ulcer(s)  [ ]Rash    CRITICAL CARE:  [ ] Shock Present  [ ]Septic [ ]Cardiogenic [ ]Neurologic [ ]Hypovolemic  [ ]  Vasopressors [ ]  Inotropes   [ ] Respiratory failure present  [ ] Acute  [ ] Chronic [ ] Hypoxic  [ ] Hypercarbic [ ] Other  [ ] Other organ failure     LABS:                        8.5    9.2   )-----------( 327      ( 26 Dec 2018 06:41 )             25.1   12-26    137  |  100  |  81<H>  ----------------------------<  121<H>  4.6   |  21<L>  |  4.98<H>    Ca    8.8      26 Dec 2018 06:41  Phos  2.5     12-26  Mg     2.1     12-26    TPro  5.9<L>  /  Alb  3.2<L>  /  TBili  0.2  /  DBili  x   /  AST  12  /  ALT  14  /  AlkPhos  97  12-25  PT/INR - ( 25 Dec 2018 07:09 )   PT: 11.8 sec;   INR: 1.03 ratio     PTT - ( 25 Dec 2018 07:09 )  PTT:29.5 sec    RADIOLOGY & ADDITIONAL STUDIES: reviewed     Protein Calorie Malnutrition Present: [ ] yes [ ] no  [ ] PPSV2 < or = 30%  [ ] significant weight loss [ ] poor nutritional intake [ ] anasarca [ ] catabolic state Albumin, Serum: 3.2 g/dL (12-25-18 @ 07:09)  Artificial Nutrition [ ]     REFERRALS:   [ ]Chaplaincy  [ ] Hospice  [ ]Child Life  [ ]Social Work  [ ]Case management [ ]Holistic Therapy   Goals of Care Document:

## 2018-12-26 NOTE — PROGRESS NOTE ADULT - PROBLEM SELECTOR PROBLEM 3
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
Hypothyroidism
Pericardial effusion
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
Anemia in neoplastic disease

## 2018-12-26 NOTE — PROGRESS NOTE ADULT - ASSESSMENT
86 yo man with  history of gastric CA  has never tx'd with chemotherapy and  was sent to ER for staging. CT showed large pericardial effusion. Pt is now in PICU with a drain in chest draining pericardial fluid.    12-21-18: R/O iron deficiency: check ferritin, b12/ folate    12-22-18: received Venofer this morning. Persistent odynophagia. await pathology of pericardial fluid.    12-23-18: No more odynophagia.. HCT decreased. Repeat Ferritin. Check B12/ Folate. If Ferritin is adequate, Pt will be given Procrit.    12-24-18; Ferritin > 220,  folate 5.2, B12 > 880. on Procrit, Will start Folate 1 mg QD    12-25-18: Pt looks better. Cytology of pericardial fluid is still pending. Byrnes in place. On Ferrous sulfate, Folate and Procrit.    12-26-18: Spoke to palliative team; Continue present regimen.

## 2018-12-26 NOTE — PROGRESS NOTE ADULT - PROBLEM SELECTOR PLAN 6
DVT ppx - sequential compression  increase activity
Deep venous thrombosis prophylaxis: heparin or lovenox SQ.
DVT ppx - sequential compression

## 2018-12-26 NOTE — PROGRESS NOTE ADULT - SUBJECTIVE AND OBJECTIVE BOX
INTERVAL HPI/OVERNIGHT EVENTS:  Pt seen and examined at bedside.     Allergies/Intolerance: No Known Allergies      MEDICATIONS  (STANDING):  epoetin marimar Injectable 18020 Unit(s) IV Push <User Schedule>  ferrous    sulfate 325 milliGRAM(s) Oral two times a day  folic acid 1 milliGRAM(s) Oral daily  heparin  Injectable 5000 Unit(s) SubCutaneous every 12 hours  influenza   Vaccine 0.5 milliLiter(s) IntraMuscular once  levothyroxine 75 MICROGram(s) Oral daily  pantoprazole    Tablet 40 milliGRAM(s) Oral before breakfast  sodium bicarbonate 650 milliGRAM(s) Oral four times a day  tamsulosin 0.4 milliGRAM(s) Oral at bedtime    MEDICATIONS  (PRN):  acetaminophen   Tablet .. 650 milliGRAM(s) Oral every 6 hours PRN Mild Pain (1 - 3)        ROS: all systems reviewed and wnl      PHYSICAL EXAMINATION:  Vital Signs Last 24 Hrs  T(C): 36.7 (26 Dec 2018 04:15), Max: 37.1 (25 Dec 2018 21:25)  T(F): 98 (26 Dec 2018 04:15), Max: 98.8 (25 Dec 2018 21:25)  HR: 77 (26 Dec 2018 04:15) (77 - 94)  BP: 126/65 (26 Dec 2018 04:15) (117/54 - 126/65)  BP(mean): --  RR: 18 (26 Dec 2018 04:15) (18 - 18)  SpO2: 96% (26 Dec 2018 04:15) (96% - 97%)  CAPILLARY BLOOD GLUCOSE          12-25 @ 07:01  -  12-26 @ 07:00  --------------------------------------------------------  IN: 1200 mL / OUT: 2325 mL / NET: -1125 mL        GENERAL:   NECK: supple, No JVD  CHEST/LUNG: clear to auscultation bilaterally; no rales, rhonchi, or wheezing b/l  HEART: normal S1, S2  ABDOMEN: BS+, soft, ND, NT   EXTREMITIES:  pulses palpable; no clubbing, cyanosis, or edema b/l LEs  SKIN: no rashes or lesions      LABS:                        8.5    9.2   )-----------( 327      ( 26 Dec 2018 06:41 )             25.1     12-26    137  |  100  |  81<H>  ----------------------------<  121<H>  4.6   |  21<L>  |  4.98<H>    Ca    8.8      26 Dec 2018 06:41  Phos  2.5     12-26  Mg     2.1     12-26    TPro  5.9<L>  /  Alb  3.2<L>  /  TBili  0.2  /  DBili  x   /  AST  12  /  ALT  14  /  AlkPhos  97  12-25    PT/INR - ( 25 Dec 2018 07:09 )   PT: 11.8 sec;   INR: 1.03 ratio         PTT - ( 25 Dec 2018 07:09 )  PTT:29.5 sec INTERVAL HPI/OVERNIGHT EVENTS:  Pt seen and examined at bedside.     Allergies/Intolerance: No Known Allergies      MEDICATIONS  (STANDING):  epoetin marimar Injectable 79734 Unit(s) IV Push <User Schedule>  ferrous    sulfate 325 milliGRAM(s) Oral two times a day  folic acid 1 milliGRAM(s) Oral daily  heparin  Injectable 5000 Unit(s) SubCutaneous every 12 hours  influenza   Vaccine 0.5 milliLiter(s) IntraMuscular once  levothyroxine 75 MICROGram(s) Oral daily  pantoprazole    Tablet 40 milliGRAM(s) Oral before breakfast  sodium bicarbonate 650 milliGRAM(s) Oral four times a day  tamsulosin 0.4 milliGRAM(s) Oral at bedtime    MEDICATIONS  (PRN):  acetaminophen   Tablet .. 650 milliGRAM(s) Oral every 6 hours PRN Mild Pain (1 - 3)        ROS: all systems reviewed and wnl      PHYSICAL EXAMINATION:  Vital Signs Last 24 Hrs  T(C): 36.7 (26 Dec 2018 04:15), Max: 37.1 (25 Dec 2018 21:25)  T(F): 98 (26 Dec 2018 04:15), Max: 98.8 (25 Dec 2018 21:25)  HR: 77 (26 Dec 2018 04:15) (77 - 94)  BP: 126/65 (26 Dec 2018 04:15) (117/54 - 126/65)  BP(mean): --  RR: 18 (26 Dec 2018 04:15) (18 - 18)  SpO2: 96% (26 Dec 2018 04:15) (96% - 97%)  CAPILLARY BLOOD GLUCOSE          12-25 @ 07:01  -  12-26 @ 07:00  --------------------------------------------------------  IN: 1200 mL / OUT: 2325 mL / NET: -1125 mL        GENERAL: stable in chair, comfortable, no SOB, fevers or dysuria  NECK: supple, No JVD  CHEST/LUNG: clear to auscultation bilaterally; no rales, rhonchi, or wheezing b/l  HEART: normal S1, S2  ABDOMEN: BS+, soft, ND, NT   EXTREMITIES:  pulses palpable; no clubbing, cyanosis, or edema b/l LEs  SKIN: no rashes or lesions      LABS:                        8.5    9.2   )-----------( 327      ( 26 Dec 2018 06:41 )             25.1     12-26    137  |  100  |  81<H>  ----------------------------<  121<H>  4.6   |  21<L>  |  4.98<H>    Ca    8.8      26 Dec 2018 06:41  Phos  2.5     12-26  Mg     2.1     12-26    TPro  5.9<L>  /  Alb  3.2<L>  /  TBili  0.2  /  DBili  x   /  AST  12  /  ALT  14  /  AlkPhos  97  12-25    PT/INR - ( 25 Dec 2018 07:09 )   PT: 11.8 sec;   INR: 1.03 ratio         PTT - ( 25 Dec 2018 07:09 )  PTT:29.5 sec

## 2018-12-26 NOTE — PROGRESS NOTE ADULT - ASSESSMENT
86 y/o male pmhx gastric CA not treated with chemotherapy, hypothyroidism, hyperlipidemia, STELLA obstructive uropathy with hyperkalemia on recent blood work, pericardial tamponade s/p pericardiocentesis.

## 2018-12-26 NOTE — PROGRESS NOTE ADULT - PROBLEM SELECTOR PLAN 1
Pt has Hx of gastric CA  has never tx'd with chemotherapy and was sent to ER for staging.   CT showed large pericardial effusion s/p pericardiocentesis   Waiting for pericardial fluid cytology   pt complains of chronic GERD and epigastric discomfort tx with anti acids at home, currently on Protonix 40mg daily added simethicone today   Oncology Dr. Pacheco

## 2018-12-26 NOTE — PROGRESS NOTE ADULT - PROBLEM SELECTOR PROBLEM 2
Acute kidney injury superimposed on CKD
Malignant neoplasm of stomach, unspecified location
STELLA (acute kidney injury)
Acute kidney injury superimposed on CKD

## 2018-12-26 NOTE — PROGRESS NOTE ADULT - PROBLEM SELECTOR PLAN 3
CT showed large pericardial effusion s/p pericardiocentesis   Waiting for pericardial fluid cytology

## 2018-12-26 NOTE — PROGRESS NOTE ADULT - ATTENDING COMMENTS
Thank you. My team will follow.    Joaquin Bright M.D, F.A.C.C  [facility]   [phone] Thank you. Patient is stable from a cardiac standpoint. Please reconsult if needed.     Joaquin Bright M.D, F.A.C.C  [facility]   [phone]

## 2018-12-26 NOTE — PROGRESS NOTE ADULT - SUBJECTIVE AND OBJECTIVE BOX
[facility]   [phone]  RENAL FOLLOW UP NOTE  --------------------------------------------------------------------------------  Chief Complaint:    24 hour events/subjective:    seen earlier.   states in english feeling better and appetite is improving stated urinated     PAST HISTORY  --------------------------------------------------------------------------------  No significant changes to PMH, PSH, FHx, SHx, unless otherwise noted    ALLERGIES & MEDICATIONS  --------------------------------------------------------------------------------  Allergies    No Known Allergies    Intolerances      Standing Inpatient Medications  epoetin marimar Injectable 48730 Unit(s) IV Push <User Schedule>  ferrous    sulfate 325 milliGRAM(s) Oral daily  folic acid 1 milliGRAM(s) Oral daily  heparin  Injectable 5000 Unit(s) SubCutaneous every 12 hours  influenza   Vaccine 0.5 milliLiter(s) IntraMuscular once  levothyroxine 75 MICROGram(s) Oral daily  pantoprazole    Tablet 40 milliGRAM(s) Oral before breakfast  sodium bicarbonate 650 milliGRAM(s) Oral four times a day  tamsulosin 0.4 milliGRAM(s) Oral at bedtime    PRN Inpatient Medications  acetaminophen   Tablet .. 650 milliGRAM(s) Oral every 6 hours PRN  simethicone 80 milliGRAM(s) Chew every 8 hours PRN      REVIEW OF SYSTEMS  --------------------------------------------------------------------------------    Gen: denies fevers/chills,  CVS: denies chest pain/palpitations  Resp: denies SOB/Cough  GI: Denies N/V/Abd pain  : Denies dysuria/oliguria/hematuria    All other systems were reviewed and are negative, except as noted.    VITALS/PHYSICAL EXAM  --------------------------------------------------------------------------------  T(C): 36.8 (12-26-18 @ 13:00), Max: 37.1 (12-25-18 @ 21:25)  HR: 101 (12-26-18 @ 14:35) (77 - 103)  BP: 112/66 (12-26-18 @ 14:35) (110/62 - 126/65)  RR: 18 (12-26-18 @ 13:00) (18 - 18)  SpO2: 99% (12-26-18 @ 14:35) (96% - 100%)  Wt(kg): --        12-25-18 @ 07:01  -  12-26-18 @ 07:00  --------------------------------------------------------  IN: 1200 mL / OUT: 2325 mL / NET: -1125 mL    12-26-18 @ 07:01  -  12-26-18 @ 18:37  --------------------------------------------------------  IN: 480 mL / OUT: 275 mL / NET: 205 mL      Physical Exam:  	  Gen: alert oriented place pale appearing. speaks limited english   	Pulm: Decreased breath sounds b/l bases. no rales or ronchi or wheezing  	CV: RRR, S1/S2. no rub  	Back: No CVA tenderness  	Abd: +BS, soft, nontender/nondistended  	: No suprapubic tenderness.               Extremity: No cyanosis, no edema no clubbing  		      LABS/STUDIES  --------------------------------------------------------------------------------              8.5    9.2   >-----------<  327      [12-26-18 @ 06:41]              25.1     137  |  100  |  81  ----------------------------<  121      [12-26-18 @ 06:41]  4.6   |  21  |  4.98        Ca     8.8     [12-26-18 @ 06:41]      Mg     2.1     [12-26-18 @ 06:41]      Phos  2.5     [12-26-18 @ 06:41]    TPro  5.9  /  Alb  3.2  /  TBili  0.2  /  DBili  x   /  AST  12  /  ALT  14  /  AlkPhos  97  [12-25-18 @ 07:09]    PT/INR: PT 11.8 , INR 1.03       [12-25-18 @ 07:09]  PTT: 29.5       [12-25-18 @ 07:09]      Creatinine Trend:  SCr 4.98 [12-26 @ 06:41]  SCr 5.15 [12-25 @ 07:09]  SCr 5.40 [12-24 @ 04:29]  SCr 6.14 [12-23 @ 05:35]  SCr 6.70 [12-22 @ 05:48]              Urinalysis - [12-19-18 @ 15:45]      Color Colorless / Appearance Clear / SG 1.012 / pH 6.5      Gluc Negative / Ketone Negative  / Bili Negative / Urobili Negative       Blood Negative / Protein 30 mg/dL / Leuk Est Negative / Nitrite Negative      RBC 5 / WBC 1 / Hyaline 1 / Gran  / Sq Epi  / Non Sq Epi 0 / Bacteria Negative    Urine Creatinine 38      [12-24-18 @ 07:59]  Urine Protein 64      [12-24-18 @ 07:59]    Iron 11, TIBC 157, %sat 7      [12-21-18 @ 17:25]  Ferritin 223      [12-24-18 @ 08:23]  TSH 8.92      [12-20-18 @ 08:18]    DESTINY: titer Negative, pattern --      [12-20-18 @ 13:26]  C3 Complement 99      [12-20-18 @ 13:26]  C4 Complement 32      [12-20-18 @ 13:26]  Immunofixation Serum:   No Monoclonal Band Identified      [12-20-18 @ 13:26]  SPEP Interpretation: Normal Electrophoresis Pattern      [12-20-18 @ 13:26]

## 2018-12-26 NOTE — PROGRESS NOTE ADULT - PROBLEM SELECTOR PLAN 5
Discussed with pt importance of filling out HCP form. Form was completed he assigned son Jamseon Collier as primary HCP and son Markel Collier as alternative agent. Copy given to family and placed on chart. Discussed with pt importance of filling out HCP form. Form was completed he assigned son Jameson Collier as primary HCP and son Markel Collier as alternative agent. Copy given to family and placed on chart.  Palliative care team will sign off, please reconsult as needed

## 2018-12-26 NOTE — PROGRESS NOTE ADULT - PROBLEM SELECTOR PLAN 1
Patient s/p pericardiocentesis and drain, 500 cc bloody fluid drained. Cytology pending. Repeat TTE post drainage showed no pericardial fluid. Patient is hemodynamically stable and asymptomatic.

## 2018-12-26 NOTE — PROGRESS NOTE ADULT - PROBLEM SELECTOR PROBLEM 4
Hypothyroidism, unspecified type
Encounter for palliative care
Hyperlipidemia
Hypothyroidism, unspecified type
Malignant neoplasm of stomach, unspecified location

## 2018-12-26 NOTE — PROGRESS NOTE ADULT - ASSESSMENT
84 y/o male Hx gastric CA not treated with chemotherapy, hypothyroidism, hyperlipidemia, STELLA obstructive uropathy with hyperkalemia on recent blood work, pericardial tamponade s/p pericardiocentesis. Pt also developed STELLA 2/2 BPH s/p Byrnes catheter removal today and voiding. Pt is waiting for cytology of pericardial fluid to r/o malignancy.

## 2018-12-26 NOTE — PROGRESS NOTE ADULT - SUBJECTIVE AND OBJECTIVE BOX
PROGRESS NOTE  Reason for follow up: pericardial effusion  Overnight: No new events.   Update: Patient sitting up in chair. Feeling well, no complaints. Cr 4.9, repeat TTE post drainage no pericardial effusion.     Subjective: "I feel good"  Complains of: none  Review of symptoms: Cardiac:  No chest pain. No dyspnea. No palpitations.  Respiratory:no cough. No dyspnea  Gastrointestinal: No diarrhea. No abdominal pain. No bleeding.     Past medical history: No updates.   Chronic conditions:  Hyperlipidemia: stable, gastric CA: stable. Hypothyroidism: stable  	  Vitals:  T(C): 36.8 (12-26-18 @ 13:00), Max: 37.1 (12-25-18 @ 21:25)  HR: 103 (12-26-18 @ 13:00) (77 - 103)  BP: 110/62 (12-26-18 @ 13:00) (110/62 - 126/65)  RR: 18 (12-26-18 @ 13:00) (18 - 18)  SpO2: 100% (12-26-18 @ 13:00) (96% - 100%)  Wt(kg): --  I&O's Summary    25 Dec 2018 07:01  -  26 Dec 2018 07:00  --------------------------------------------------------  IN: 1200 mL / OUT: 2325 mL / NET: -1125 mL    26 Dec 2018 07:01  -  26 Dec 2018 13:02  --------------------------------------------------------  IN: 0 mL / OUT: 275 mL / NET: -275 mL          PHYSICAL EXAM:  Appearance: Comfortable. No acute distress  HEENT:  Head and neck: Atraumatic. Normocephalic.  Normal oral mucosa, PERRL, Neck is supple. No JVD, No carotid bruit.   Neurologic: A & O x 3, no focal deficits. EOMI   Lymphatic: No cervical lymphadenopathy  Cardiovascular: Normal S1 S2, No murmur, rubs/gallops. No JVD, No edema  Respiratory: Lungs clear to auscultation  Gastrointestinal:  Soft, Non-tender, + BS  Lower Extremities: No edema  Psychiatry: Patient is calm. No agitation. Mood & affect appropriate  Skin: No rashes/ ecchymoses/cyanosis/ulcers visualized on the face, hands or feet.    CURRENT MEDICATIONS:  tamsulosin 0.4 milliGRAM(s) Oral at bedtime    pantoprazole    Tablet  levothyroxine  epoetin marimar Injectable  ferrous    sulfate  folic acid  heparin  Injectable  influenza   Vaccine  sodium bicarbonate      LABS:	 	                              8.5    9.2   )-----------( 327      ( 26 Dec 2018 06:41 )             25.1     12-26    137  |  100  |  81<H>  ----------------------------<  121<H>  4.6   |  21<L>  |  4.98<H>    Ca    8.8      26 Dec 2018 06:41  Phos  2.5     12-26  Mg     2.1     12-26    TPro  5.9<L>  /  Alb  3.2<L>  /  TBili  0.2  /  DBili  x   /  AST  12  /  ALT  14  /  AlkPhos  97  12-25    proBNP:   Lipid Profile:   HgA1c: TSH:     TELEMETRY: Reviewed  no events  ECG:  Reviewed by me. 	sinus rhythm, inferior infarct    DIAGNOSTIC TESTING:  [ ] Echocardiogram:   < from: Transthoracic Echocardiogram (12.25.18 @ 08:49) >  Focused TTE to assess the pericardium.  1. Mildly thickened pericardium. No pericardial effusion.  *** Compared with echocardiogram of 12/22/2018, results are  similar on today's study.    < end of copied text >        OTHER:

## 2018-12-26 NOTE — PROGRESS NOTE ADULT - PROBLEM SELECTOR PLAN 4
Pts goal is to receive traditional treatment if possible, understands pericardial fluid cytology will determine next steps. He is also aware renal function would need to improve for him to be a candidate for treatment. Pts goal is to receive traditional treatment if possible, understands pericardial fluid cytology will determine next steps. He is also aware renal function would need to improve for him to be a candidate for treatment.  Possible DC Friday with home PT

## 2018-12-26 NOTE — PROGRESS NOTE ADULT - ASSESSMENT
86 y/o M-patient apparently with a history of gastric CA  non chemo treated with STELLA obstructive uropathy with hyperkalemia on recent blood work with pericardial tamponade on echo       1- STELLA   2- pericardial tamponade/effusion  3- obstructive uropathy  4- anemia     s/p pericardiocentesis  TOV  cr improving  has good uo.   procrit [id number] units tiw   suspect stella in setting of ATN due to decrease cardiac outpt due to pericardial effusion as well as urinary retention likely   d/w family at bedside

## 2018-12-27 ENCOUNTER — TRANSCRIPTION ENCOUNTER (OUTPATIENT)
Age: 83
End: 2018-12-27

## 2018-12-27 LAB
ANION GAP SERPL CALC-SCNC: 16 MMOL/L — SIGNIFICANT CHANGE UP (ref 5–17)
BUN SERPL-MCNC: 88 MG/DL — HIGH (ref 7–23)
CALCIUM SERPL-MCNC: 9.4 MG/DL — SIGNIFICANT CHANGE UP (ref 8.4–10.5)
CHLORIDE SERPL-SCNC: 95 MMOL/L — LOW (ref 96–108)
CO2 SERPL-SCNC: 20 MMOL/L — LOW (ref 22–31)
CREAT SERPL-MCNC: 4.83 MG/DL — HIGH (ref 0.5–1.3)
GLUCOSE SERPL-MCNC: 118 MG/DL — HIGH (ref 70–99)
HCT VFR BLD CALC: 25.5 % — LOW (ref 39–50)
HGB BLD-MCNC: 8.7 G/DL — LOW (ref 13–17)
MCHC RBC-ENTMCNC: 30.4 PG — SIGNIFICANT CHANGE UP (ref 27–34)
MCHC RBC-ENTMCNC: 34 GM/DL — SIGNIFICANT CHANGE UP (ref 32–36)
MCV RBC AUTO: 89.4 FL — SIGNIFICANT CHANGE UP (ref 80–100)
NON-GYNECOLOGICAL CYTOLOGY STUDY: SIGNIFICANT CHANGE UP
PLATELET # BLD AUTO: 366 K/UL — SIGNIFICANT CHANGE UP (ref 150–400)
POTASSIUM SERPL-MCNC: 4.4 MMOL/L — SIGNIFICANT CHANGE UP (ref 3.5–5.3)
POTASSIUM SERPL-SCNC: 4.4 MMOL/L — SIGNIFICANT CHANGE UP (ref 3.5–5.3)
RBC # BLD: 2.85 M/UL — LOW (ref 4.2–5.8)
RBC # FLD: 13.1 % — SIGNIFICANT CHANGE UP (ref 10.3–14.5)
SODIUM SERPL-SCNC: 131 MMOL/L — LOW (ref 135–145)
WBC # BLD: 10.4 K/UL — SIGNIFICANT CHANGE UP (ref 3.8–10.5)
WBC # FLD AUTO: 10.4 K/UL — SIGNIFICANT CHANGE UP (ref 3.8–10.5)

## 2018-12-27 RX ORDER — SIMETHICONE 80 MG/1
80 TABLET, CHEWABLE ORAL THREE TIMES A DAY
Qty: 0 | Refills: 0 | Status: DISCONTINUED | OUTPATIENT
Start: 2018-12-27 | End: 2018-12-29

## 2018-12-27 RX ORDER — PANTOPRAZOLE SODIUM 20 MG/1
40 TABLET, DELAYED RELEASE ORAL
Qty: 0 | Refills: 0 | Status: DISCONTINUED | OUTPATIENT
Start: 2018-12-27 | End: 2018-12-29

## 2018-12-27 RX ORDER — FINASTERIDE 5 MG/1
5 TABLET, FILM COATED ORAL DAILY
Qty: 0 | Refills: 0 | Status: DISCONTINUED | OUTPATIENT
Start: 2018-12-27 | End: 2018-12-29

## 2018-12-27 RX ADMIN — Medication 1 MILLIGRAM(S): at 13:46

## 2018-12-27 RX ADMIN — Medication 325 MILLIGRAM(S): at 13:45

## 2018-12-27 RX ADMIN — Medication 650 MILLIGRAM(S): at 16:37

## 2018-12-27 RX ADMIN — PANTOPRAZOLE SODIUM 40 MILLIGRAM(S): 20 TABLET, DELAYED RELEASE ORAL at 16:38

## 2018-12-27 RX ADMIN — Medication 650 MILLIGRAM(S): at 23:11

## 2018-12-27 RX ADMIN — Medication 75 MICROGRAM(S): at 05:43

## 2018-12-27 RX ADMIN — PANTOPRAZOLE SODIUM 40 MILLIGRAM(S): 20 TABLET, DELAYED RELEASE ORAL at 05:44

## 2018-12-27 RX ADMIN — TAMSULOSIN HYDROCHLORIDE 0.4 MILLIGRAM(S): 0.4 CAPSULE ORAL at 21:46

## 2018-12-27 RX ADMIN — HEPARIN SODIUM 5000 UNIT(S): 5000 INJECTION INTRAVENOUS; SUBCUTANEOUS at 05:44

## 2018-12-27 RX ADMIN — Medication 650 MILLIGRAM(S): at 00:50

## 2018-12-27 RX ADMIN — Medication 650 MILLIGRAM(S): at 05:43

## 2018-12-27 RX ADMIN — Medication 650 MILLIGRAM(S): at 13:45

## 2018-12-27 RX ADMIN — HEPARIN SODIUM 5000 UNIT(S): 5000 INJECTION INTRAVENOUS; SUBCUTANEOUS at 16:42

## 2018-12-27 NOTE — PROGRESS NOTE ADULT - SUBJECTIVE AND OBJECTIVE BOX
INTERVAL HPI/OVERNIGHT EVENTS:  Pt seen and examined at bedside.     Allergies/Intolerance: No Known Allergies      MEDICATIONS  (STANDING):  epoetin marimar Injectable 29136 Unit(s) IV Push <User Schedule>  ferrous    sulfate 325 milliGRAM(s) Oral daily  folic acid 1 milliGRAM(s) Oral daily  heparin  Injectable 5000 Unit(s) SubCutaneous every 12 hours  influenza   Vaccine 0.5 milliLiter(s) IntraMuscular once  levothyroxine 75 MICROGram(s) Oral daily  pantoprazole    Tablet 40 milliGRAM(s) Oral before breakfast  sodium bicarbonate 650 milliGRAM(s) Oral four times a day  tamsulosin 0.4 milliGRAM(s) Oral at bedtime    MEDICATIONS  (PRN):  acetaminophen   Tablet .. 650 milliGRAM(s) Oral every 6 hours PRN Mild Pain (1 - 3)        ROS: all systems reviewed and wnl      PHYSICAL EXAMINATION:  Vital Signs Last 24 Hrs  T(C): 36.9 (27 Dec 2018 12:24), Max: 36.9 (27 Dec 2018 12:24)  T(F): 98.5 (27 Dec 2018 12:24), Max: 98.5 (27 Dec 2018 12:24)  HR: 89 (27 Dec 2018 12:24) (86 - 92)  BP: 118/66 (27 Dec 2018 12:24) (111/66 - 131/75)  BP(mean): --  RR: 18 (27 Dec 2018 12:24) (18 - 18)  SpO2: 99% (27 Dec 2018 12:24) (98% - 100%)  CAPILLARY BLOOD GLUCOSE          12-26 @ 07:01 - 12-27 @ 07:00  --------------------------------------------------------  IN: 480 mL / OUT: 400 mL / NET: 80 mL    12-27 @ 07:01 - 12-27 @ 15:29  --------------------------------------------------------  IN: 300 mL / OUT: 200 mL / NET: 100 mL        GENERAL:   NECK: supple, No JVD  CHEST/LUNG: clear to auscultation bilaterally; no rales, rhonchi, or wheezing b/l  HEART: normal S1, S2  ABDOMEN: BS+, soft, ND, NT   EXTREMITIES:  pulses palpable; no clubbing, cyanosis, or edema b/l LEs  SKIN: no rashes or lesions      LABS:                        8.7    10.4  )-----------( 366      ( 27 Dec 2018 06:55 )             25.5     12-27    131<L>  |  95<L>  |  88<H>  ----------------------------<  118<H>  4.4   |  20<L>  |  4.83<H>    Ca    9.4      27 Dec 2018 06:55  Phos  2.5     12-26  Mg     2.1     12-26 INTERVAL HPI/OVERNIGHT EVENTS:  Pt seen and examined at bedside.     Allergies/Intolerance: No Known Allergies      MEDICATIONS  (STANDING):  epoetin marimar Injectable 98537 Unit(s) IV Push <User Schedule>  ferrous    sulfate 325 milliGRAM(s) Oral daily  folic acid 1 milliGRAM(s) Oral daily  heparin  Injectable 5000 Unit(s) SubCutaneous every 12 hours  influenza   Vaccine 0.5 milliLiter(s) IntraMuscular once  levothyroxine 75 MICROGram(s) Oral daily  pantoprazole    Tablet 40 milliGRAM(s) Oral before breakfast  sodium bicarbonate 650 milliGRAM(s) Oral four times a day  tamsulosin 0.4 milliGRAM(s) Oral at bedtime    MEDICATIONS  (PRN):  acetaminophen   Tablet .. 650 milliGRAM(s) Oral every 6 hours PRN Mild Pain (1 - 3)        ROS: all systems reviewed and wnl      PHYSICAL EXAMINATION:  Vital Signs Last 24 Hrs  T(C): 36.9 (27 Dec 2018 12:24), Max: 36.9 (27 Dec 2018 12:24)  T(F): 98.5 (27 Dec 2018 12:24), Max: 98.5 (27 Dec 2018 12:24)  HR: 89 (27 Dec 2018 12:24) (86 - 92)  BP: 118/66 (27 Dec 2018 12:24) (111/66 - 131/75)  BP(mean): --  RR: 18 (27 Dec 2018 12:24) (18 - 18)  SpO2: 99% (27 Dec 2018 12:24) (98% - 100%)  CAPILLARY BLOOD GLUCOSE          12-26 @ 07:01 - 12-27 @ 07:00  --------------------------------------------------------  IN: 480 mL / OUT: 400 mL / NET: 80 mL    12-27 @ 07:01 - 12-27 @ 15:29  --------------------------------------------------------  IN: 300 mL / OUT: 200 mL / NET: 100 mL        GENERAL: stable in chair, son at bedside, tolerates meals well, no fevers or SOB  NECK: supple, No JVD  CHEST/LUNG: clear to auscultation bilaterally; no rales, rhonchi, or wheezing b/l  HEART: normal S1, S2  ABDOMEN: BS+, soft, ND, NT   EXTREMITIES:  pulses palpable; no clubbing, cyanosis, or edema b/l LEs  SKIN: no rashes or lesions      LABS:                        8.7    10.4  )-----------( 366      ( 27 Dec 2018 06:55 )             25.5     12-27    131<L>  |  95<L>  |  88<H>  ----------------------------<  118<H>  4.4   |  20<L>  |  4.83<H>    Ca    9.4      27 Dec 2018 06:55  Phos  2.5     12-26  Mg     2.1     12-26

## 2018-12-27 NOTE — DISCHARGE NOTE ADULT - CARE PLAN
Principal Discharge DX:	Acute kidney injury superimposed on CKD  Goal:	Management of renal functions  Assessment and plan of treatment:	Follow up with Dr Holman your Kidney doctor by Wednesday 1/2. Call office for appointment  Dr Holman will manage the Procrit injections.  Avoid taking (NSAIDs) - (ex: Ibuprofen, Advil, Celebrex, Naprosyn)  Avoid taking any nephrotoxic agents (can harm kidneys) - Intravenous contrast for diagnostic testing, combination cold medications.  Have all medications adjusted for your renal function by your Health Care Provider.  Blood pressure control is important.  Take all medication as prescribed.  Secondary Diagnosis:	Anemia in neoplastic disease  Assessment and plan of treatment:	Continue medications as prescribed especially the Iron supplements  Take the iron with orange juice or water.  Do NOT take the iron pills 2 hours before or after any dairy products or calcium containing foods or it will not be absorbed.  Follow up with Dr Homlan for Procrit injections  Secondary Diagnosis:	Benign prostatic hyperplasia, unspecified whether lower urinary tract symptoms present  Assessment and plan of treatment:	Continue medications as prescribed  Continue indwelling urinary catheter (Byrnes) for another week until seen by your Urologist.  Secondary Diagnosis:	Hypothyroidism, unspecified type  Assessment and plan of treatment:	Continue medications as prescribed  You do not make enough thyroid hormone so you must take this replacement medication  signs & symptoms of low levels of thyroid hormone - tired, getting cold easily, coarse or thin hair, constipation, shortness of breath, swelling, irregular periods  your doctor will do thyroid hormone blood tests at least once a year to monitor if medication dose is adequate  take your thyroid medicine as directed by your doctor & on empty stomach  Secondary Diagnosis:	Urinary retention  Assessment and plan of treatment:	You are going home with an indwelling urinary catheter.   This will stay in place for another week until you are seen by your Urologist and then you will have a trial of void without the catheter.  Keep the catheter free from pulling and report any fever or persistent pain to your Urologist.  Follow up

## 2018-12-27 NOTE — PROGRESS NOTE ADULT - ASSESSMENT
86 y/o M-patient apparently with a history of gastric CA  non chemo treated with STELLA obstructive uropathy with hyperkalemia on recent blood work with pericardial tamponade on echo       1- STELLA   2- pericardial tamponade/effusion  3- obstructive uropathy  4- anemia     s/p pericardiocentesis    cr improving  has good uo.   procrit 11316 units tiw   suspect stella in setting of ATN due to decrease cardiac outpt due to pericardial effusion as well as urinary retention likely   pt again with urinary retention. place ayala failed TOV has significant urinary retention   d/w family at bedside

## 2018-12-27 NOTE — PROGRESS NOTE ADULT - SUBJECTIVE AND OBJECTIVE BOX
INTERVAL HPI/OVERNIGHT EVENTS:  Pt seen and examined at bedside.     Allergies/Intolerance: No Known Allergies      MEDICATIONS  (STANDING):  epoetin marimar Injectable 89578 Unit(s) IV Push <User Schedule>  ferrous    sulfate 325 milliGRAM(s) Oral daily  folic acid 1 milliGRAM(s) Oral daily  heparin  Injectable 5000 Unit(s) SubCutaneous every 12 hours  influenza   Vaccine 0.5 milliLiter(s) IntraMuscular once  levothyroxine 75 MICROGram(s) Oral daily  pantoprazole    Tablet 40 milliGRAM(s) Oral before breakfast  sodium bicarbonate 650 milliGRAM(s) Oral four times a day  tamsulosin 0.4 milliGRAM(s) Oral at bedtime    MEDICATIONS  (PRN):  acetaminophen   Tablet .. 650 milliGRAM(s) Oral every 6 hours PRN Mild Pain (1 - 3)        ROS: all systems reviewed and wnl      PHYSICAL EXAMINATION:  Vital Signs Last 24 Hrs  T(C): 36.7 (27 Dec 2018 04:53), Max: 36.8 (26 Dec 2018 13:00)  T(F): 98 (27 Dec 2018 04:53), Max: 98.3 (26 Dec 2018 20:41)  HR: 92 (27 Dec 2018 04:53) (89 - 103)  BP: 118/70 (27 Dec 2018 04:53) (110/62 - 131/75)  BP(mean): --  RR: 18 (27 Dec 2018 04:53) (18 - 18)  SpO2: 100% (27 Dec 2018 04:53) (98% - 100%)  CAPILLARY BLOOD GLUCOSE          12-26 @ 07:01  -  12-27 @ 07:00  --------------------------------------------------------  IN: 480 mL / OUT: 400 mL / NET: 80 mL        GENERAL:   NECK: supple, No JVD  CHEST/LUNG: clear to auscultation bilaterally; no rales, rhonchi, or wheezing b/l  HEART: normal S1, S2  ABDOMEN: BS+, soft, ND, NT   EXTREMITIES:  pulses palpable; no clubbing, cyanosis, or edema b/l LEs  SKIN: no rashes or lesions      LABS:                        8.7    10.4  )-----------( 366      ( 27 Dec 2018 06:55 )             25.5     12-27    131<L>  |  95<L>  |  88<H>  ----------------------------<  118<H>  4.4   |  20<L>  |  4.83<H>    Ca    9.4      27 Dec 2018 06:55  Phos  2.5     12-26  Mg     2.1     12-26

## 2018-12-27 NOTE — PROGRESS NOTE ADULT - ASSESSMENT
gastric cancer-s/p nontraditional treatment -bilateral leg  edema-s/p pericardial effusion-drain placed-renal  failure-stella on atn/obst  uropathy    Pericardial drain removed. TTE acceptable, post-procedure pericardial effusion. No additional echo needed.  No pericardial effusion seen on most recent TTE. Byrnes removed, doing well,   able to void. STELLA appears to be recovering, creatinine 4.83 from 7.09.  Stop tele. Continue NAHCO3 650 mg QID as serum bicarb 21, at goal. Continue Flomax .4 mg/day for BPH, Synthyroid 75 mcg/day for hypothyroidism and Ferrous sulfate 325 mg bid for iron deficiency anemia.  SMA_7 daily.  Nephrology consult reviewed and appreciated.    Discharge to home with home PT Friday and follow-up nephrology weekly.

## 2018-12-27 NOTE — DISCHARGE NOTE ADULT - HOSPITAL COURSE
86 y/o male pmhx gastric CA not treated with chemotherapy, hypothyroidism, hyperlipidemia, STELLA obstructive uropathy with hyperkalemia on recent blood work, pericardial tamponade s/p pericardiocentesis.  ECHO post pericardiocentesis no effusion      Pt failed TOV ,  will discharge with Byrnes  and Leg bag  and OP urology  Eval   Will be discharged with Home PT  and Home  care

## 2018-12-27 NOTE — CHART NOTE - NSCHARTNOTEFT_GEN_A_CORE
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 Pt  has a PMH of  BPH.  Hypothyroidism. Malignant neoplasm of stomach.  not a candidate for dialysis      Source: pt son at bedside and pt resting in chair, spoke with son    Diet : dash, low sodium,no concentrated K+, no concentrated phosphorous, soft  nepro x 3 daily    Patient reports: spoke to son, pt is having difficulty chewing some of the foods provided, agreed to change diet to mechanical soft. requests reduce nepro to 1 x daily. pt likes soups and cheesecake. pt with stomach cancer and has pain in his stomach when he eats. son adding salt to pt food at times. possible discharge tomorrow      PO intake : 25% of meals     Source for PO intake: son          Daily Weight in k.1 (-), Weight in k.9 (-26), Weight in k.7 (-25), Weight in k.8 (-24), Weight in k.5 (-23), Weight in k.6 (12-21), Weight in k.2 (12-21)  % Weight Change: 9% loss since     Pertinent Medications: MEDICATIONS  (STANDING):  epoetin marimar Injectable 74907 Unit(s) IV Push <User Schedule>  ferrous    sulfate 325 milliGRAM(s) Oral daily  folic acid 1 milliGRAM(s) Oral daily  heparin  Injectable 5000 Unit(s) SubCutaneous every 12 hours  influenza   Vaccine 0.5 milliLiter(s) IntraMuscular once  levothyroxine 75 MICROGram(s) Oral daily  pantoprazole    Tablet 40 milliGRAM(s) Oral before breakfast  sodium bicarbonate 650 milliGRAM(s) Oral four times a day  tamsulosin 0.4 milliGRAM(s) Oral at bedtime    MEDICATIONS  (PRN):  acetaminophen   Tablet .. 650 milliGRAM(s) Oral every 6 hours PRN Mild Pain (1 - 3)    Pertinent Labs:  @ 06:55: Na 131<L>, BUN 88<H>, Cr 4.83<H>, <H>, K+ 4.4  K+ and PO4 have been WNL since admission      Skin per nursing documentation: no pressure ulcers  Edema: no edema    Estimated Needs:   [x ] no change since previous assessment  [ ] recalculated:     Previous Nutrition Diagnosis: severe malnutrition  Nutrition Diagnosis is: being addressed with supplements    Interventions:  1)adjusted preferences in CBORD      Recommend  1)change die tot Mechanical Soft, 60Gram protein  2)decrease Nepro to 1 x daily      Monitoring and Evaluation:     Continue to monitor Nutritional intake, Tolerance to diet prescription, weights, labs, skin integrity    RD remains available upon request and will follow up per protocol  Crista Brink MA, RD, CDN #[phone]

## 2018-12-27 NOTE — PROGRESS NOTE ADULT - SUBJECTIVE AND OBJECTIVE BOX
Patient is a 85y old  Male who presents with a chief complaint of Worsening creatinine with suprapubic fullness (27 Dec 2018 13:05)       Pt is seen and examined  pt is awake and sitting up in chair  pt seems comfortable     MEDICATIONS  (STANDING):  epoetin marimar Injectable 28069 Unit(s) IV Push <User Schedule>  ferrous    sulfate 325 milliGRAM(s) Oral daily  folic acid 1 milliGRAM(s) Oral daily  heparin  Injectable 5000 Unit(s) SubCutaneous every 12 hours  influenza   Vaccine 0.5 milliLiter(s) IntraMuscular once  levothyroxine 75 MICROGram(s) Oral daily  pantoprazole    Tablet 40 milliGRAM(s) Oral before breakfast  sodium bicarbonate 650 milliGRAM(s) Oral four times a day  tamsulosin 0.4 milliGRAM(s) Oral at bedtime    MEDICATIONS  (PRN):  acetaminophen   Tablet .. 650 milliGRAM(s) Oral every 6 hours PRN Mild Pain (1 - 3)      Allergies    No Known Allergies    Intolerances        Vital Signs Last 24 Hrs  T(C): 36.9 (27 Dec 2018 12:24), Max: 36.9 (27 Dec 2018 12:24)  T(F): 98.5 (27 Dec 2018 12:24), Max: 98.5 (27 Dec 2018 12:24)  HR: 89 (27 Dec 2018 12:24) (86 - 101)  BP: 118/66 (27 Dec 2018 12:24) (111/66 - 131/75)  BP(mean): --  RR: 18 (27 Dec 2018 12:24) (18 - 18)  SpO2: 99% (27 Dec 2018 12:24) (98% - 100%)        LABS:                          8.7    10.4  )-----------( 366      ( 27 Dec 2018 06:55 )             25.5         Mean Cell Volume : 89.4 fl  Mean Cell Hemoglobin : 30.4 pg  Mean Cell Hemoglobin Concentration : 34.0 gm/dL  Auto Neutrophil # : x  Auto Lymphocyte # : x  Auto Monocyte # : x  Auto Eosinophil # : x  Auto Basophil # : x  Auto Neutrophil % : x  Auto Lymphocyte % : x  Auto Monocyte % : x  Auto Eosinophil % : x  Auto Basophil % : x    Serial CBC's  12-27 @ 06:55  Hct-25.5 / Hgb-8.7 / Plat-366 / RBC-2.85 / WBC-10.4          Serial CBC's  12-26 @ 06:41  Hct-25.1 / Hgb-8.5 / Plat-327 / RBC-2.79 / WBC-9.2          Serial CBC's  12-25 @ 07:09  Hct-25.2 / Hgb-8.0 / Plat-317 / RBC-2.76 / WBC-9.4          Serial CBC's  12-24 @ 04:29  Hct-23.1 / Hgb-7.8 / Plat-251 / RBC-2.54 / WBC-7.6            12-27    131<L>  |  95<L>  |  88<H>  ----------------------------<  118<H>  4.4   |  20<L>  |  4.83<H>    Ca    9.4      27 Dec 2018 06:55  Phos  2.5     12-26  Mg     2.1     12-26            WBC Count: 10.4 K/uL (12-27-18 @ 06:55)  Hemoglobin: 8.7 g/dL (12-27-18 @ 06:55)  Hematocrit: 25.5 % (12-27-18 @ 06:55)  Platelet Count - Automated: 366 K/uL (12-27-18 @ 06:55)  WBC Count: 9.2 K/uL (12-26-18 @ 06:41)  Hemoglobin: 8.5 g/dL (12-26-18 @ 06:41)  Hematocrit: 25.1 % (12-26-18 @ 06:41)  Platelet Count - Automated: 327 K/uL (12-26-18 @ 06:41)  WBC Count: 9.4 K/uL (12-25-18 @ 07:09)  Hemoglobin: 8.0 g/dL (12-25-18 @ 07:09)  Hematocrit: 25.2 % (12-25-18 @ 07:09)  Platelet Count - Automated: 317 K/uL (12-25-18 @ 07:09)  Ferritin, Serum: 223 ng/mL (12-24-18 @ 08:23)  Folate, Serum: 5.2 ng/mL (12-24-18 @ 08:23)  Vitamin B12, Serum: 887 pg/mL (12-24-18 @ 08:23)  WBC Count: 7.6 K/uL (12-24-18 @ 04:29)  Hemoglobin: 7.8 g/dL (12-24-18 @ 04:29)  Hematocrit: 23.1 % (12-24-18 @ 04:29)  Platelet Count - Automated: 251 K/uL (12-24-18 @ 04:29)  WBC Count: 7.1 K/uL (12-23-18 @ 05:35)  Hemoglobin: 7.8 g/dL (12-23-18 @ 05:35)  Hematocrit: 22.7 % (12-23-18 @ 05:35)  Platelet Count - Automated: 246 K/uL (12-23-18 @ 05:35)  WBC Count: 8.5 K/uL (12-22-18 @ 05:48)  Hemoglobin: 8.6 g/dL (12-22-18 @ 05:48)  Hematocrit: 25.0 % (12-22-18 @ 05:48)  Platelet Count - Automated: 244 K/uL (12-22-18 @ 05:48)  Ferritin, Serum: 103 ng/mL (12-21-18 @ 17:25)  Iron - Total Binding Capacity.: 157 ug/dL (12-21-18 @ 17:25)  WBC Count: 9.0 K/uL (12-21-18 @ 15:18)  Hemoglobin: 7.4 g/dL (12-21-18 @ 15:18)  Hematocrit: 21.7 % (12-21-18 @ 15:18)  Platelet Count - Automated: 205 K/uL (12-21-18 @ 15:18)  WBC Count: 8.6 K/uL (12-21-18 @ 09:10)  Hemoglobin: 7.6 g/dL (12-21-18 @ 09:10)  Hematocrit: 23.5 % (12-21-18 @ 09:10)  Platelet Count - Automated: 212 K/uL (12-21-18 @ 09:10)  WBC Count: 8.4 K/uL (12-21-18 @ 04:21)  Hemoglobin: 7.7 g/dL (12-21-18 @ 04:21)  Hematocrit: 21.5 % (12-21-18 @ 04:21)  Platelet Count - Automated: 203 K/uL (12-21-18 @ 04:21)  WBC Count: 8.19 K/uL (12-20-18 @ 08:14)  Hemoglobin: 8.3 g/dL (12-20-18 @ 08:14)  Hematocrit: 25.9 % (12-20-18 @ 08:14)  Platelet Count - Automated: 198 K/uL (12-20-18 @ 08:14)  Hemoglobin: 8.5 g/dL (12-19-18 @ 15:21)  Platelet Count - Automated: 224 K/uL (12-19-18 @ 15:21)  Hematocrit: 24.0 % (12-19-18 @ 15:21)  WBC Count: 8.1 K/uL (12-19-18 @ 15:21)      Serum Protein Electrophoresis Interp: Normal Electrophoresis Pattern (12-20 @ 13:26)  Immunofixation, Serum:   No Monoclonal Band Identified (12-20 @ 13:26)

## 2018-12-27 NOTE — DISCHARGE NOTE ADULT - CONDITIONS AT DISCHARGE
Patient PIVL removed. no  evidence of infiltration noted at discharge. Patient skin intact, vital signs stable, Patient with no complaints of SOB, or chest pain at discharge. Pt's son discharged as per MD orders in stable condition with jamie.  Pt's son verbalized understanding discharge instructions. Pt provided with printed discharge instructions as well as medication list . Patient PIVL removed. no  evidence of infiltration noted at discharge. Patient skin intact, vital signs stable, Patient with no complaints of SOB, or chest pain at discharge. Pt's son discharged as per MD orders in stable condition with jamie. Pt dc'd with delivered rolling walker.  Pt's son verbalized understanding discharge instructions. Pt provided with printed discharge instructions as well as medication list .

## 2018-12-27 NOTE — DISCHARGE NOTE ADULT - PLAN OF CARE
Management of renal functions Follow up with Dr Holman your Kidney doctor by Wednesday 1/2. Call office for appointment  Dr Holman will manage the Procrit injections.  Avoid taking (NSAIDs) - (ex: Ibuprofen, Advil, Celebrex, Naprosyn)  Avoid taking any nephrotoxic agents (can harm kidneys) - Intravenous contrast for diagnostic testing, combination cold medications.  Have all medications adjusted for your renal function by your Health Care Provider.  Blood pressure control is important.  Take all medication as prescribed. Continue medications as prescribed especially the Iron supplements  Take the iron with orange juice or water.  Do NOT take the iron pills 2 hours before or after any dairy products or calcium containing foods or it will not be absorbed.  Follow up with Dr Holman for Procrit injections Continue medications as prescribed  Continue indwelling urinary catheter (Byrnes) for another week until seen by your Urologist. Continue medications as prescribed  You do not make enough thyroid hormone so you must take this replacement medication  signs & symptoms of low levels of thyroid hormone - tired, getting cold easily, coarse or thin hair, constipation, shortness of breath, swelling, irregular periods  your doctor will do thyroid hormone blood tests at least once a year to monitor if medication dose is adequate  take your thyroid medicine as directed by your doctor & on empty stomach You are going home with an indwelling urinary catheter.   This will stay in place for another week until you are seen by your Urologist and then you will have a trial of void without the catheter.  Keep the catheter free from pulling and report any fever or persistent pain to your Urologist.  Follow up

## 2018-12-27 NOTE — PROGRESS NOTE ADULT - SUBJECTIVE AND OBJECTIVE BOX
[facility]   [phone]  RENAL FOLLOW UP NOTE  --------------------------------------------------------------------------------  Chief Complaint:    24 hour events/subjective:    seen. states appetite is improving     PAST HISTORY  --------------------------------------------------------------------------------  No significant changes to PMH, PSH, FHx, SHx, unless otherwise noted    ALLERGIES & MEDICATIONS  --------------------------------------------------------------------------------  Allergies    No Known Allergies    Intolerances      Standing Inpatient Medications  epoetin marimar Injectable 56595 Unit(s) IV Push <User Schedule>  ferrous    sulfate 325 milliGRAM(s) Oral daily  finasteride 5 milliGRAM(s) Oral daily  folic acid 1 milliGRAM(s) Oral daily  heparin  Injectable 5000 Unit(s) SubCutaneous every 12 hours  influenza   Vaccine 0.5 milliLiter(s) IntraMuscular once  levothyroxine 75 MICROGram(s) Oral daily  pantoprazole    Tablet 40 milliGRAM(s) Oral two times a day  sodium bicarbonate 650 milliGRAM(s) Oral four times a day  tamsulosin 0.4 milliGRAM(s) Oral at bedtime    PRN Inpatient Medications  acetaminophen   Tablet .. 650 milliGRAM(s) Oral every 6 hours PRN  simethicone 80 milliGRAM(s) Chew three times a day PRN      REVIEW OF SYSTEMS  --------------------------------------------------------------------------------    Gen: denies fevers/chills,  CVS: denies chest pain/palpitations  Resp: denies SOB/Cough  GI: Denies N/V/Abd pain  : Denies dysuria/oliguria    All other systems were reviewed and are negative, except as noted.    VITALS/PHYSICAL EXAM  --------------------------------------------------------------------------------  T(C): 36.9 (12-27-18 @ 12:24), Max: 36.9 (12-27-18 @ 12:24)  HR: 89 (12-27-18 @ 12:24) (86 - 92)  BP: 118/66 (12-27-18 @ 12:24) (111/66 - 131/75)  RR: 18 (12-27-18 @ 12:24) (18 - 18)  SpO2: 99% (12-27-18 @ 12:24) (98% - 100%)  Wt(kg): --        12-26-18 @ 07:01  -  12-27-18 @ 07:00  --------------------------------------------------------  IN: 480 mL / OUT: 400 mL / NET: 80 mL    12-27-18 @ 07:01  -  12-27-18 @ 20:15  --------------------------------------------------------  IN: 1650 mL / OUT: 200 mL / NET: 1450 mL      Physical Exam:  	  	  Gen: alert oriented place pale appearing. speaks limited english   	Pulm: Decreased breath sounds b/l bases. no rales or ronchi or wheezing  	CV: RRR, S1/S2. no rub  	Back: No CVA tenderness  	Abd: +BS, soft, nontender/nondistended  	: distended bladder palp              Extremity: No cyanosis, no edema no clubbing  		  	    LABS/STUDIES  --------------------------------------------------------------------------------              8.7    10.4  >-----------<  366      [12-27-18 @ 06:55]              25.5     131  |  95  |  88  ----------------------------<  118      [12-27-18 @ 06:55]  4.4   |  20  |  4.83        Ca     9.4     [12-27-18 @ 06:55]      Mg     2.1     [12-26-18 @ 06:41]      Phos  2.5     [12-26-18 @ 06:41]            Creatinine Trend:  SCr 4.83 [12-27 @ 06:55]  SCr 4.98 [12-26 @ 06:41]  SCr 5.15 [12-25 @ 07:09]  SCr 5.40 [12-24 @ 04:29]  SCr 6.14 [12-23 @ 05:35]              Urinalysis - [12-19-18 @ 15:45]      Color Colorless / Appearance Clear / SG 1.012 / pH 6.5      Gluc Negative / Ketone Negative  / Bili Negative / Urobili Negative       Blood Negative / Protein 30 mg/dL / Leuk Est Negative / Nitrite Negative      RBC 5 / WBC 1 / Hyaline 1 / Gran  / Sq Epi  / Non Sq Epi 0 / Bacteria Negative    Urine Creatinine 38      [12-24-18 @ 07:59]  Urine Protein 64      [12-24-18 @ 07:59]    Iron 11, TIBC 157, %sat 7      [12-21-18 @ 17:25]  Ferritin 223      [12-24-18 @ 08:23]  TSH 8.92      [12-20-18 @ 08:18]    DESTINY: titer Negative, pattern --      [12-20-18 @ 13:26]  C3 Complement 99      [12-20-18 @ 13:26]  C4 Complement 32      [12-20-18 @ 13:26]  Immunofixation Serum:   No Monoclonal Band Identified      [12-20-18 @ 13:26]  SPEP Interpretation: Normal Electrophoresis Pattern      [12-20-18 @ 13:26]

## 2018-12-27 NOTE — DISCHARGE NOTE ADULT - MEDICATION SUMMARY - MEDICATIONS TO TAKE
I will START or STAY ON the medications listed below when I get home from the hospital:    rolling walker  -- Indication: For Ambulation assistance    finasteride 5 mg oral tablet  -- 1 tab(s) by mouth once a day  -- Indication: For Benign prostatic hyperplasia, unspecified whether lower urinary tract symptoms present    sodium bicarbonate 650 mg oral tablet  -- 1 tab(s) by mouth 4 times a day  -- Indication: For Acute kidney injury superimposed on CKD    tamsulosin 0.4 mg oral capsule  -- 1 cap(s) by mouth once a day (at bedtime)  -- Indication: For Benign prostatic hyperplasia, unspecified whether lower urinary tract symptoms present    epoetin marimar  -- 49937 unit(s) subcutaneous once a week per Dr Holman   -- Indication: For Anemia in neoplastic disease    FeroSul 325 mg (65 mg elemental iron) oral tablet  -- 1 tab(s) by mouth once a day   -- Indication: For Anemia in neoplastic disease    simethicone 80 mg oral tablet, chewable  -- 1 tab(s) by mouth 3 times a day, As needed, Upset Stomach  -- Indication: For indigestion    pantoprazole 40 mg oral delayed release tablet  -- 1 tab(s) by mouth 2 times a day  -- Indication: For indigestion    levothyroxine 75 mcg (0.075 mg) oral tablet  -- 1 tab(s) by mouth once a day  -- Indication: For Hypothyroidism    folic acid 1 mg oral tablet  -- 1 tab(s) by mouth once a day  -- Indication: For Suppliment

## 2018-12-27 NOTE — PROCEDURE NOTE - PROCEDURE
<<-----Click on this checkbox to enter Procedure Urinary catheterization  12/27/2018    Active  [id number]

## 2018-12-27 NOTE — DISCHARGE NOTE ADULT - ADDITIONAL INSTRUCTIONS
Follow up with your Primary MD within 1 week. Call office for appointment  Follow up with your Nephrologist Dr Holman by Wednesday 1/2. Call office for appointment  Follow up with your Urologist within 1 week. Call office for appointment

## 2018-12-27 NOTE — PROCEDURE NOTE - NSURITECHNIQUE_GU_A_CORE
Proper hand hygiene was performed/Sterile gloves were worn for the duration of the procedure/The catheter was secured with a securement device (e.g. StatLock)/All applicable medical record documentation is completed/The catheter was appropriately lubricated/The collection bag is below the level of the patient and urinary bladder/The site was cleaned with soap/water and sterile solution (betadine)/The urinary drainage system is closed at the end of the procedure/A sterile drape was used to cover all adjacent areas

## 2018-12-27 NOTE — DISCHARGE NOTE ADULT - CARE PROVIDER_API CALL
Edouard Holman (DO), Nephrology  [address] [address]  Phone: [phone]  Fax: [phone]    Emmanuel Pulliam (DO), Family Medicine  [address] [place] [address]  Phone: [phone]  Fax: [phone]

## 2018-12-27 NOTE — DISCHARGE NOTE ADULT - PATIENT PORTAL LINK FT
You can access the [email or web address] Patient Portal, offered by [facility], by registering with the following website: [email or web address]

## 2018-12-27 NOTE — PROGRESS NOTE ADULT - ASSESSMENT
84 yo man with  history of gastric CA  has never tx'd with chemotherapy and  was sent to ER for staging. CT showed large pericardial effusion. Pt is now in PICU with a drain in chest draining pericardial fluid.    12-21-18: R/O iron deficiency: check ferritin, b12/ folate    12-22-18: received Venofer this morning. Persistent odynophagia. await pathology of pericardial fluid.    12-23-18: No more odynophagia.. HCT decreased. Repeat Ferritin. Check B12/ Folate. If Ferritin is adequate, Pt will be given Procrit.    12-24-18; Ferritin > 220,  folate 5.2, B12 > 880. on Procrit, Will start Folate 1 mg QD    12-25-18: Pt looks better. Cytology of pericardial fluid is still pending. Byrnes in place. On Ferrous sulfate, Folate and Procrit.    12-26-18: Spoke to palliative team; Continue present regimen.    12-27-18: abd discomfort at times, Consider Tylenol for pain. H/H stable. Cr trending down.

## 2018-12-27 NOTE — DISCHARGE NOTE ADULT - HOME CARE AGENCY
[facility] [phone] RN to provide skilled care, physical therapy evaluation, HHA evaluation.

## 2018-12-27 NOTE — DISCHARGE NOTE ADULT - SECONDARY DIAGNOSIS.
Anemia in neoplastic disease Benign prostatic hyperplasia, unspecified whether lower urinary tract symptoms present Hypothyroidism, unspecified type Urinary retention

## 2018-12-27 NOTE — PROCEDURE NOTE - NSPROCDETAILS_GEN_ALL_CORE
sterile technique, indwelling urinary device inserted/sterile technique, old cysto removed, new tube inserted/kit not available for this size catheter/prior to placement, an active physician order for the placement of a urinary catheter was verified

## 2018-12-28 LAB
ANION GAP SERPL CALC-SCNC: 15 MMOL/L — SIGNIFICANT CHANGE UP (ref 5–17)
BUN SERPL-MCNC: 85 MG/DL — HIGH (ref 7–23)
CALCIUM SERPL-MCNC: 9.2 MG/DL — SIGNIFICANT CHANGE UP (ref 8.4–10.5)
CHLORIDE SERPL-SCNC: 98 MMOL/L — SIGNIFICANT CHANGE UP (ref 96–108)
CO2 SERPL-SCNC: 21 MMOL/L — LOW (ref 22–31)
CREAT SERPL-MCNC: 5.02 MG/DL — HIGH (ref 0.5–1.3)
GLUCOSE BLDC GLUCOMTR-MCNC: 146 MG/DL — HIGH (ref 70–99)
GLUCOSE SERPL-MCNC: 165 MG/DL — HIGH (ref 70–99)
HCT VFR BLD CALC: 25.2 % — LOW (ref 39–50)
HGB BLD-MCNC: 8 G/DL — LOW (ref 13–17)
MCHC RBC-ENTMCNC: 28.6 PG — SIGNIFICANT CHANGE UP (ref 27–34)
MCHC RBC-ENTMCNC: 31.6 GM/DL — LOW (ref 32–36)
MCV RBC AUTO: 90.3 FL — SIGNIFICANT CHANGE UP (ref 80–100)
PLATELET # BLD AUTO: 369 K/UL — SIGNIFICANT CHANGE UP (ref 150–400)
POTASSIUM SERPL-MCNC: 4.7 MMOL/L — SIGNIFICANT CHANGE UP (ref 3.5–5.3)
POTASSIUM SERPL-SCNC: 4.7 MMOL/L — SIGNIFICANT CHANGE UP (ref 3.5–5.3)
RBC # BLD: 2.79 M/UL — LOW (ref 4.2–5.8)
RBC # FLD: 12.6 % — SIGNIFICANT CHANGE UP (ref 10.3–14.5)
SODIUM SERPL-SCNC: 134 MMOL/L — LOW (ref 135–145)
WBC # BLD: 8.8 K/UL — SIGNIFICANT CHANGE UP (ref 3.8–10.5)
WBC # FLD AUTO: 8.8 K/UL — SIGNIFICANT CHANGE UP (ref 3.8–10.5)

## 2018-12-28 PROCEDURE — 93010 ELECTROCARDIOGRAM REPORT: CPT

## 2018-12-28 RX ORDER — IRON SUCROSE 20 MG/ML
100 INJECTION, SOLUTION INTRAVENOUS ONCE
Qty: 0 | Refills: 0 | Status: COMPLETED | OUTPATIENT
Start: 2018-12-29 | End: 2018-12-29

## 2018-12-28 RX ORDER — IRON SUCROSE 20 MG/ML
100 INJECTION, SOLUTION INTRAVENOUS ONCE
Qty: 0 | Refills: 0 | Status: COMPLETED | OUTPATIENT
Start: 2018-12-28 | End: 2018-12-28

## 2018-12-28 RX ADMIN — PANTOPRAZOLE SODIUM 40 MILLIGRAM(S): 20 TABLET, DELAYED RELEASE ORAL at 05:46

## 2018-12-28 RX ADMIN — ERYTHROPOIETIN 10000 UNIT(S): 10000 INJECTION, SOLUTION INTRAVENOUS; SUBCUTANEOUS at 06:16

## 2018-12-28 RX ADMIN — Medication 325 MILLIGRAM(S): at 11:25

## 2018-12-28 RX ADMIN — HEPARIN SODIUM 5000 UNIT(S): 5000 INJECTION INTRAVENOUS; SUBCUTANEOUS at 17:27

## 2018-12-28 RX ADMIN — HEPARIN SODIUM 5000 UNIT(S): 5000 INJECTION INTRAVENOUS; SUBCUTANEOUS at 05:46

## 2018-12-28 RX ADMIN — FINASTERIDE 5 MILLIGRAM(S): 5 TABLET, FILM COATED ORAL at 11:25

## 2018-12-28 RX ADMIN — IRON SUCROSE 210 MILLIGRAM(S): 20 INJECTION, SOLUTION INTRAVENOUS at 23:02

## 2018-12-28 RX ADMIN — Medication 650 MILLIGRAM(S): at 17:28

## 2018-12-28 RX ADMIN — Medication 650 MILLIGRAM(S): at 11:25

## 2018-12-28 RX ADMIN — Medication 650 MILLIGRAM(S): at 05:46

## 2018-12-28 RX ADMIN — Medication 1 MILLIGRAM(S): at 11:25

## 2018-12-28 RX ADMIN — Medication 650 MILLIGRAM(S): at 23:03

## 2018-12-28 RX ADMIN — PANTOPRAZOLE SODIUM 40 MILLIGRAM(S): 20 TABLET, DELAYED RELEASE ORAL at 17:28

## 2018-12-28 RX ADMIN — Medication 75 MICROGRAM(S): at 05:46

## 2018-12-28 RX ADMIN — TAMSULOSIN HYDROCHLORIDE 0.4 MILLIGRAM(S): 0.4 CAPSULE ORAL at 21:15

## 2018-12-28 NOTE — PROGRESS NOTE ADULT - ASSESSMENT
gastric cancer-s/p nontraditional treatment -bilateral leg  edema-s/p pericardial effusion-drain placed-renal  failure-stella on atn/obst  uropathy    Pericardial drain removed. TTE acceptable, post-procedure pericardial effusion. No additional echo needed.  No pericardial effusion seen on most recent TTE. Byrnes replaced last PM. STELLA appears to be recovering, creatinine 4.83 from 7.09.  Stop tele. Continue NAHCO3 650 mg QID as serum bicarb 21, at goal. Continue Flomax .4 mg/day for BPH. Agree with Proscar 5 mg/day as well. Synthyroid 75 mcg/day for hypothyroidism and Ferrous sulfate 325 mg bid for iron deficiency anemia.  SMA_7 daily.  Nephrology consult reviewed and appreciated. Mild elevation   in creatinine to 5.02 likely from retension.  Discharge to home with home PT Saturday. Follow-up nephrology weekly.

## 2018-12-28 NOTE — PROGRESS NOTE ADULT - SUBJECTIVE AND OBJECTIVE BOX
INTERVAL HPI/OVERNIGHT EVENTS:  Pt seen and examined at bedside.     Allergies/Intolerance: No Known Allergies      MEDICATIONS  (STANDING):  epoetin marimar Injectable 19928 Unit(s) IV Push <User Schedule>  ferrous    sulfate 325 milliGRAM(s) Oral daily  finasteride 5 milliGRAM(s) Oral daily  folic acid 1 milliGRAM(s) Oral daily  heparin  Injectable 5000 Unit(s) SubCutaneous every 12 hours  influenza   Vaccine 0.5 milliLiter(s) IntraMuscular once  levothyroxine 75 MICROGram(s) Oral daily  pantoprazole    Tablet 40 milliGRAM(s) Oral two times a day  sodium bicarbonate 650 milliGRAM(s) Oral four times a day  tamsulosin 0.4 milliGRAM(s) Oral at bedtime    MEDICATIONS  (PRN):  acetaminophen   Tablet .. 650 milliGRAM(s) Oral every 6 hours PRN Mild Pain (1 - 3)  simethicone 80 milliGRAM(s) Chew three times a day PRN Upset Stomach        ROS: all systems reviewed and wnl      PHYSICAL EXAMINATION:  Vital Signs Last 24 Hrs  T(C): 36.8 (28 Dec 2018 04:54), Max: 36.9 (27 Dec 2018 12:24)  T(F): 98.2 (28 Dec 2018 04:54), Max: 98.5 (27 Dec 2018 12:24)  HR: 92 (28 Dec 2018 04:54) (78 - 92)  BP: 114/63 (28 Dec 2018 04:54) (111/66 - 125/63)  BP(mean): --  RR: 18 (28 Dec 2018 04:54) (18 - 18)  SpO2: 99% (28 Dec 2018 04:54) (99% - 99%)  CAPILLARY BLOOD GLUCOSE      POCT Blood Glucose.: 146 mg/dL (28 Dec 2018 08:37)      12-27 @ 07:01  -  12-28 @ 07:00  --------------------------------------------------------  IN: 1770 mL / OUT: 1300 mL / NET: 470 mL        GENERAL:   NECK: supple, No JVD  CHEST/LUNG: clear to auscultation bilaterally; no rales, rhonchi, or wheezing b/l  HEART: normal S1, S2  ABDOMEN: BS+, soft, ND, NT   EXTREMITIES:  pulses palpable; no clubbing, cyanosis, or edema b/l LEs  SKIN: no rashes or lesions      LABS:                        8.0    8.8   )-----------( 369      ( 28 Dec 2018 06:32 )             25.2     12-28    134<L>  |  98  |  85<H>  ----------------------------<  165<H>  4.7   |  21<L>  |  5.02<H>    Ca    9.2      28 Dec 2018 06:32 INTERVAL HPI/OVERNIGHT EVENTS:  Pt seen and examined at bedside.     Allergies/Intolerance: No Known Allergies      MEDICATIONS  (STANDING):  epoetin marimar Injectable 08670 Unit(s) IV Push <User Schedule>  ferrous    sulfate 325 milliGRAM(s) Oral daily  finasteride 5 milliGRAM(s) Oral daily  folic acid 1 milliGRAM(s) Oral daily  heparin  Injectable 5000 Unit(s) SubCutaneous every 12 hours  influenza   Vaccine 0.5 milliLiter(s) IntraMuscular once  levothyroxine 75 MICROGram(s) Oral daily  pantoprazole    Tablet 40 milliGRAM(s) Oral two times a day  sodium bicarbonate 650 milliGRAM(s) Oral four times a day  tamsulosin 0.4 milliGRAM(s) Oral at bedtime    MEDICATIONS  (PRN):  acetaminophen   Tablet .. 650 milliGRAM(s) Oral every 6 hours PRN Mild Pain (1 - 3)  simethicone 80 milliGRAM(s) Chew three times a day PRN Upset Stomach        ROS: all systems reviewed and wnl      PHYSICAL EXAMINATION:  Vital Signs Last 24 Hrs  T(C): 36.8 (28 Dec 2018 04:54), Max: 36.9 (27 Dec 2018 12:24)  T(F): 98.2 (28 Dec 2018 04:54), Max: 98.5 (27 Dec 2018 12:24)  HR: 92 (28 Dec 2018 04:54) (78 - 92)  BP: 114/63 (28 Dec 2018 04:54) (111/66 - 125/63)  BP(mean): --  RR: 18 (28 Dec 2018 04:54) (18 - 18)  SpO2: 99% (28 Dec 2018 04:54) (99% - 99%)  CAPILLARY BLOOD GLUCOSE      POCT Blood Glucose.: 146 mg/dL (28 Dec 2018 08:37)      12-27 @ 07:01  -  12-28 @ 07:00  --------------------------------------------------------  IN: 1770 mL / OUT: 1300 mL / NET: 470 mL        GENERAL: stable, eating lunch, no CP, fevers or SOB  NECK: supple, No JVD  CHEST/LUNG: clear to auscultation bilaterally; no rales, rhonchi, or wheezing b/l  HEART: normal S1, S2  ABDOMEN: BS+, soft, ND, NT   EXTREMITIES:  pulses palpable; no clubbing, cyanosis, or edema b/l LEs  Byrnes placed last PM.   SKIN: no rashes or lesions      LABS:                        8.0    8.8   )-----------( 369      ( 28 Dec 2018 06:32 )             25.2     12-28    134<L>  |  98  |  85<H>  ----------------------------<  165<H>  4.7   |  21<L>  |  5.02<H>    Ca    9.2      28 Dec 2018 06:32

## 2018-12-28 NOTE — CHART NOTE - NSCHARTNOTEFT_GEN_A_CORE
Due to Pt 's ongoing issues with  gastric CA , Pericardial effusion , Anemia and  unsteady gait , will require a rolling walker . Pt is in agreement with this equipment , and family willing to assist him with the Rolling Walker .   William Galvan NP-C [id number]

## 2018-12-28 NOTE — PROGRESS NOTE ADULT - ASSESSMENT
86 yo man with  history of gastric CA  has never tx'd with chemotherapy and  was sent to ER for staging. CT showed large pericardial effusion. Pt is now in PICU with a drain in chest draining pericardial fluid.    12-21-18: R/O iron deficiency: check ferritin, b12/ folate    12-22-18: received Venofer this morning. Persistent odynophagia. await pathology of pericardial fluid.    12-23-18: No more odynophagia.. HCT decreased. Repeat Ferritin. Check B12/ Folate. If Ferritin is adequate, Pt will be given Procrit.    12-24-18; Ferritin > 220,  folate 5.2, B12 > 880. on Procrit, Will start Folate 1 mg QD    12-25-18: Pt looks better. Cytology of pericardial fluid is still pending. Byrnes in place. On Ferrous sulfate, Folate and Procrit.    12-26-18: Spoke to palliative team; Continue present regimen.    12-27-18: abd discomfort at times, Consider Tylenol for pain. H/H stable. Cr trending down.    12-28-18: Cytology of pericardial fluid is negative of malignancy. Pt had recurrent urinary retention and has Byrnes now. Cr went up.

## 2018-12-28 NOTE — PROGRESS NOTE ADULT - SUBJECTIVE AND OBJECTIVE BOX
[facility]   [phone]  RENAL FOLLOW UP NOTE  --------------------------------------------------------------------------------  Chief Complaint:    24 hour events/subjective:    son at bedside.   no new c/o   required ayala due to urinary obstruction       PAST HISTORY  --------------------------------------------------------------------------------  No significant changes to PMH, PSH, FHx, SHx, unless otherwise noted    ALLERGIES & MEDICATIONS  --------------------------------------------------------------------------------  Allergies    No Known Allergies    Intolerances      Standing Inpatient Medications  epoetin marimar Injectable 79473 Unit(s) IV Push <User Schedule>  ferrous    sulfate 325 milliGRAM(s) Oral daily  finasteride 5 milliGRAM(s) Oral daily  folic acid 1 milliGRAM(s) Oral daily  heparin  Injectable 5000 Unit(s) SubCutaneous every 12 hours  influenza   Vaccine 0.5 milliLiter(s) IntraMuscular once  levothyroxine 75 MICROGram(s) Oral daily  pantoprazole    Tablet 40 milliGRAM(s) Oral two times a day  sodium bicarbonate 650 milliGRAM(s) Oral four times a day  tamsulosin 0.4 milliGRAM(s) Oral at bedtime    PRN Inpatient Medications  acetaminophen   Tablet .. 650 milliGRAM(s) Oral every 6 hours PRN  simethicone 80 milliGRAM(s) Chew three times a day PRN      REVIEW OF SYSTEMS  --------------------------------------------------------------------------------    Gen: denies  fevers/chills,  CVS: denies chest pain/palpitations  Resp: denies SOB/Cough  GI: Denies N/V/Abd pain  : Denies dysuria     All other systems were reviewed and are negative, except as noted.    VITALS/PHYSICAL EXAM  --------------------------------------------------------------------------------  T(C): 37.1 (12-28-18 @ 12:41), Max: 37.1 (12-28-18 @ 12:41)  HR: 95 (12-28-18 @ 12:41) (92 - 95)  BP: 91/56 (12-28-18 @ 12:41) (91/56 - 114/63)  RR: 18 (12-28-18 @ 12:41) (18 - 18)  SpO2: 97% (12-28-18 @ 12:41) (97% - 99%)  Wt(kg): --        12-27-18 @ 07:01  -  12-28-18 @ 07:00  --------------------------------------------------------  IN: 1770 mL / OUT: 1300 mL / NET: 470 mL    12-28-18 @ 07:01  -  12-28-18 @ 20:48  --------------------------------------------------------  IN: 480 mL / OUT: 1000 mL / NET: -520 mL      Physical Exam:  	  Gen: alert oriented place pale appearing. speaks limited english   	Pulm: Decreased breath sounds b/l bases. no rales or ronchi or wheezing  	CV: RRR, S1/S2. no rub  	Back: No CVA tenderness  	Abd: +BS, soft, nontender/nondistended  	: distended bladder palp              Extremity: No cyanosis, no edema no clubbing        LABS/STUDIES  --------------------------------------------------------------------------------              8.0    8.8   >-----------<  369      [12-28-18 @ 06:32]              25.2     134  |  98  |  85  ----------------------------<  165      [12-28-18 @ 06:32]  4.7   |  21  |  5.02        Ca     9.2     [12-28-18 @ 06:32]            Creatinine Trend:  SCr 5.02 [12-28 @ 06:32]  SCr 4.83 [12-27 @ 06:55]  SCr 4.98 [12-26 @ 06:41]  SCr 5.15 [12-25 @ 07:09]  SCr 5.40 [12-24 @ 04:29]              Urinalysis - [12-19-18 @ 15:45]      Color Colorless / Appearance Clear / SG 1.012 / pH 6.5      Gluc Negative / Ketone Negative  / Bili Negative / Urobili Negative       Blood Negative / Protein 30 mg/dL / Leuk Est Negative / Nitrite Negative      RBC 5 / WBC 1 / Hyaline 1 / Gran  / Sq Epi  / Non Sq Epi 0 / Bacteria Negative    Urine Creatinine 38      [12-24-18 @ 07:59]  Urine Protein 64      [12-24-18 @ 07:59]    Iron 11, TIBC 157, %sat 7      [12-21-18 @ 17:25]  Ferritin 223      [12-24-18 @ 08:23]  TSH 8.92      [12-20-18 @ 08:18]    DESTINY: titer Negative, pattern --      [12-20-18 @ 13:26]  C3 Complement 99      [12-20-18 @ 13:26]  C4 Complement 32      [12-20-18 @ 13:26]  Immunofixation Serum:   No Monoclonal Band Identified      [12-20-18 @ 13:26]  SPEP Interpretation: Normal Electrophoresis Pattern      [12-20-18 @ 13:26]

## 2018-12-28 NOTE — PROGRESS NOTE ADULT - SUBJECTIVE AND OBJECTIVE BOX
Patient is a 85y old  Male who presents with a chief complaint of Worsening creatinine with suprapubic fullness (28 Dec 2018 10:14)       Pt is seen and examined  pt is awake and lying in bed  pt seems comfortable       MEDICATIONS  (STANDING):  epoetin marimar Injectable 10026 Unit(s) IV Push <User Schedule>  ferrous    sulfate 325 milliGRAM(s) Oral daily  finasteride 5 milliGRAM(s) Oral daily  folic acid 1 milliGRAM(s) Oral daily  heparin  Injectable 5000 Unit(s) SubCutaneous every 12 hours  influenza   Vaccine 0.5 milliLiter(s) IntraMuscular once  levothyroxine 75 MICROGram(s) Oral daily  pantoprazole    Tablet 40 milliGRAM(s) Oral two times a day  sodium bicarbonate 650 milliGRAM(s) Oral four times a day  tamsulosin 0.4 milliGRAM(s) Oral at bedtime    MEDICATIONS  (PRN):  acetaminophen   Tablet .. 650 milliGRAM(s) Oral every 6 hours PRN Mild Pain (1 - 3)  simethicone 80 milliGRAM(s) Chew three times a day PRN Upset Stomach      Allergies    No Known Allergies    Intolerances        Vital Signs Last 24 Hrs  T(C): 37.1 (28 Dec 2018 12:41), Max: 37.1 (28 Dec 2018 12:41)  T(F): 98.8 (28 Dec 2018 12:41), Max: 98.8 (28 Dec 2018 12:41)  HR: 95 (28 Dec 2018 12:41) (78 - 95)  BP: 91/56 (28 Dec 2018 12:41) (91/56 - 125/63)  BP(mean): --  RR: 18 (28 Dec 2018 12:41) (18 - 18)  SpO2: 97% (28 Dec 2018 12:41) (97% - 99%)        LABS:                          8.0    8.8   )-----------( 369      ( 28 Dec 2018 06:32 )             25.2         Mean Cell Volume : 90.3 fl  Mean Cell Hemoglobin : 28.6 pg  Mean Cell Hemoglobin Concentration : 31.6 gm/dL  Auto Neutrophil # : x  Auto Lymphocyte # : x  Auto Monocyte # : x  Auto Eosinophil # : x  Auto Basophil # : x  Auto Neutrophil % : x  Auto Lymphocyte % : x  Auto Monocyte % : x  Auto Eosinophil % : x  Auto Basophil % : x    Serial CBC's  12-28 @ 06:32  Hct-25.2 / Hgb-8.0 / Plat-369 / RBC-2.79 / WBC-8.8          Serial CBC's  12-27 @ 06:55  Hct-25.5 / Hgb-8.7 / Plat-366 / RBC-2.85 / WBC-10.4          Serial CBC's  12-26 @ 06:41  Hct-25.1 / Hgb-8.5 / Plat-327 / RBC-2.79 / WBC-9.2          Serial CBC's  12-25 @ 07:09  Hct-25.2 / Hgb-8.0 / Plat-317 / RBC-2.76 / WBC-9.4            12-28    134<L>  |  98  |  85<H>  ----------------------------<  165<H>  4.7   |  21<L>  |  5.02<H>    Ca    9.2      28 Dec 2018 06:32            WBC Count: 8.8 K/uL (12-28-18 @ 06:32)  Hemoglobin: 8.0 g/dL (12-28-18 @ 06:32)  Hematocrit: 25.2 % (12-28-18 @ 06:32)  Platelet Count - Automated: 369 K/uL (12-28-18 @ 06:32)  WBC Count: 10.4 K/uL (12-27-18 @ 06:55)  Hemoglobin: 8.7 g/dL (12-27-18 @ 06:55)  Hematocrit: 25.5 % (12-27-18 @ 06:55)  Platelet Count - Automated: 366 K/uL (12-27-18 @ 06:55)  WBC Count: 9.2 K/uL (12-26-18 @ 06:41)  Hemoglobin: 8.5 g/dL (12-26-18 @ 06:41)  Hematocrit: 25.1 % (12-26-18 @ 06:41)  Platelet Count - Automated: 327 K/uL (12-26-18 @ 06:41)  WBC Count: 9.4 K/uL (12-25-18 @ 07:09)  Hemoglobin: 8.0 g/dL (12-25-18 @ 07:09)  Hematocrit: 25.2 % (12-25-18 @ 07:09)  Platelet Count - Automated: 317 K/uL (12-25-18 @ 07:09)  Ferritin, Serum: 223 ng/mL (12-24-18 @ 08:23)  Folate, Serum: 5.2 ng/mL (12-24-18 @ 08:23)  Vitamin B12, Serum: 887 pg/mL (12-24-18 @ 08:23)  WBC Count: 7.6 K/uL (12-24-18 @ 04:29)  Hemoglobin: 7.8 g/dL (12-24-18 @ 04:29)  Hematocrit: 23.1 % (12-24-18 @ 04:29)  Platelet Count - Automated: 251 K/uL (12-24-18 @ 04:29)  WBC Count: 7.1 K/uL (12-23-18 @ 05:35)  Hemoglobin: 7.8 g/dL (12-23-18 @ 05:35)  Hematocrit: 22.7 % (12-23-18 @ 05:35)  Platelet Count - Automated: 246 K/uL (12-23-18 @ 05:35)  WBC Count: 8.5 K/uL (12-22-18 @ 05:48)  Hemoglobin: 8.6 g/dL (12-22-18 @ 05:48)  Hematocrit: 25.0 % (12-22-18 @ 05:48)  Platelet Count - Automated: 244 K/uL (12-22-18 @ 05:48)  Ferritin, Serum: 103 ng/mL (12-21-18 @ 17:25)  Iron - Total Binding Capacity.: 157 ug/dL (12-21-18 @ 17:25)  WBC Count: 9.0 K/uL (12-21-18 @ 15:18)  Hemoglobin: 7.4 g/dL (12-21-18 @ 15:18)  Hematocrit: 21.7 % (12-21-18 @ 15:18)  Platelet Count - Automated: 205 K/uL (12-21-18 @ 15:18)  WBC Count: 8.6 K/uL (12-21-18 @ 09:10)  Hemoglobin: 7.6 g/dL (12-21-18 @ 09:10)  Hematocrit: 23.5 % (12-21-18 @ 09:10)  Platelet Count - Automated: 212 K/uL (12-21-18 @ 09:10)  WBC Count: 8.4 K/uL (12-21-18 @ 04:21)  Hemoglobin: 7.7 g/dL (12-21-18 @ 04:21)  Hematocrit: 21.5 % (12-21-18 @ 04:21)  Platelet Count - Automated: 203 K/uL (12-21-18 @ 04:21)  WBC Count: 8.19 K/uL (12-20-18 @ 08:14)  Hemoglobin: 8.3 g/dL (12-20-18 @ 08:14)  Hematocrit: 25.9 % (12-20-18 @ 08:14)  Platelet Count - Automated: 198 K/uL (12-20-18 @ 08:14)  Hemoglobin: 8.5 g/dL (12-19-18 @ 15:21)  Platelet Count - Automated: 224 K/uL (12-19-18 @ 15:21)  Hematocrit: 24.0 % (12-19-18 @ 15:21)  WBC Count: 8.1 K/uL (12-19-18 @ 15:21)      Serum Protein Electrophoresis Interp: Normal Electrophoresis Pattern (12-20 @ 13:26)  Immunofixation, Serum:   No Monoclonal Band Identified (12-20 @ 13:26)                  RADIOLOGY & ADDITIONAL STUDIES:

## 2018-12-28 NOTE — PROGRESS NOTE ADULT - ASSESSMENT
84 y/o M-patient apparently with a history of gastric CA  non chemo treated with STELLA obstructive uropathy with hyperkalemia on recent blood work with pericardial tamponade on echo       1- STELLA   2- pericardial tamponade/effusion  3- obstructive uropathy  4- anemia     s/p pericardiocentesis    cr still high.  has good uo.   procrit 50830 units tiw  but given low iron profile to receive iv venofer 100 mg iv x 2 days  suspect stella in setting of ATN due to decrease cardiac outpt due to pericardial effusion as well as urinary retention likely   cont ayala  d/w family at bedside

## 2018-12-29 VITALS
RESPIRATION RATE: 18 BRPM | OXYGEN SATURATION: 97 % | TEMPERATURE: 98 F | DIASTOLIC BLOOD PRESSURE: 56 MMHG | SYSTOLIC BLOOD PRESSURE: 121 MMHG | HEART RATE: 83 BPM

## 2018-12-29 LAB
ANION GAP SERPL CALC-SCNC: 15 MMOL/L — SIGNIFICANT CHANGE UP (ref 5–17)
BUN SERPL-MCNC: 79 MG/DL — HIGH (ref 7–23)
CALCIUM SERPL-MCNC: 8.9 MG/DL — SIGNIFICANT CHANGE UP (ref 8.4–10.5)
CHLORIDE SERPL-SCNC: 100 MMOL/L — SIGNIFICANT CHANGE UP (ref 96–108)
CO2 SERPL-SCNC: 22 MMOL/L — SIGNIFICANT CHANGE UP (ref 22–31)
CREAT SERPL-MCNC: 4.67 MG/DL — HIGH (ref 0.5–1.3)
GLUCOSE SERPL-MCNC: 114 MG/DL — HIGH (ref 70–99)
HCT VFR BLD CALC: 22.6 % — LOW (ref 39–50)
HGB BLD-MCNC: 7.7 G/DL — LOW (ref 13–17)
MCHC RBC-ENTMCNC: 30.5 PG — SIGNIFICANT CHANGE UP (ref 27–34)
MCHC RBC-ENTMCNC: 34.1 GM/DL — SIGNIFICANT CHANGE UP (ref 32–36)
MCV RBC AUTO: 89.6 FL — SIGNIFICANT CHANGE UP (ref 80–100)
PLATELET # BLD AUTO: 348 K/UL — SIGNIFICANT CHANGE UP (ref 150–400)
POTASSIUM SERPL-MCNC: 3.9 MMOL/L — SIGNIFICANT CHANGE UP (ref 3.5–5.3)
POTASSIUM SERPL-SCNC: 3.9 MMOL/L — SIGNIFICANT CHANGE UP (ref 3.5–5.3)
RBC # BLD: 2.52 M/UL — LOW (ref 4.2–5.8)
RBC # FLD: 12.7 % — SIGNIFICANT CHANGE UP (ref 10.3–14.5)
SODIUM SERPL-SCNC: 137 MMOL/L — SIGNIFICANT CHANGE UP (ref 135–145)
WBC # BLD: 8.8 K/UL — SIGNIFICANT CHANGE UP (ref 3.8–10.5)
WBC # FLD AUTO: 8.8 K/UL — SIGNIFICANT CHANGE UP (ref 3.8–10.5)

## 2018-12-29 PROCEDURE — 87206 SMEAR FLUORESCENT/ACID STAI: CPT

## 2018-12-29 PROCEDURE — 84295 ASSAY OF SERUM SODIUM: CPT

## 2018-12-29 PROCEDURE — 71045 X-RAY EXAM CHEST 1 VIEW: CPT

## 2018-12-29 PROCEDURE — 88305 TISSUE EXAM BY PATHOLOGIST: CPT

## 2018-12-29 PROCEDURE — 83605 ASSAY OF LACTIC ACID: CPT

## 2018-12-29 PROCEDURE — 88342 IMHCHEM/IMCYTCHM 1ST ANTB: CPT

## 2018-12-29 PROCEDURE — 86038 ANTINUCLEAR ANTIBODIES: CPT

## 2018-12-29 PROCEDURE — 76770 US EXAM ABDO BACK WALL COMP: CPT

## 2018-12-29 PROCEDURE — 86334 IMMUNOFIX E-PHORESIS SERUM: CPT

## 2018-12-29 PROCEDURE — 97110 THERAPEUTIC EXERCISES: CPT

## 2018-12-29 PROCEDURE — 80053 COMPREHEN METABOLIC PANEL: CPT

## 2018-12-29 PROCEDURE — 82435 ASSAY OF BLOOD CHLORIDE: CPT

## 2018-12-29 PROCEDURE — 84156 ASSAY OF PROTEIN URINE: CPT

## 2018-12-29 PROCEDURE — 84466 ASSAY OF TRANSFERRIN: CPT

## 2018-12-29 PROCEDURE — 84436 ASSAY OF TOTAL THYROXINE: CPT

## 2018-12-29 PROCEDURE — 86160 COMPLEMENT ANTIGEN: CPT

## 2018-12-29 PROCEDURE — 85610 PROTHROMBIN TIME: CPT

## 2018-12-29 PROCEDURE — 74176 CT ABD & PELVIS W/O CONTRAST: CPT

## 2018-12-29 PROCEDURE — 93306 TTE W/DOPPLER COMPLETE: CPT

## 2018-12-29 PROCEDURE — 82962 GLUCOSE BLOOD TEST: CPT

## 2018-12-29 PROCEDURE — 81001 URINALYSIS AUTO W/SCOPE: CPT

## 2018-12-29 PROCEDURE — 82607 VITAMIN B-12: CPT

## 2018-12-29 PROCEDURE — 82570 ASSAY OF URINE CREATININE: CPT

## 2018-12-29 PROCEDURE — 87116 MYCOBACTERIA CULTURE: CPT

## 2018-12-29 PROCEDURE — 82947 ASSAY GLUCOSE BLOOD QUANT: CPT

## 2018-12-29 PROCEDURE — 93308 TTE F-UP OR LMTD: CPT

## 2018-12-29 PROCEDURE — 85014 HEMATOCRIT: CPT

## 2018-12-29 PROCEDURE — 86850 RBC ANTIBODY SCREEN: CPT

## 2018-12-29 PROCEDURE — 84155 ASSAY OF PROTEIN SERUM: CPT

## 2018-12-29 PROCEDURE — 84480 ASSAY TRIIODOTHYRONINE (T3): CPT

## 2018-12-29 PROCEDURE — 85730 THROMBOPLASTIN TIME PARTIAL: CPT

## 2018-12-29 PROCEDURE — 84157 ASSAY OF PROTEIN OTHER: CPT

## 2018-12-29 PROCEDURE — 86900 BLOOD TYPING SEROLOGIC ABO: CPT

## 2018-12-29 PROCEDURE — 87086 URINE CULTURE/COLONY COUNT: CPT

## 2018-12-29 PROCEDURE — 89051 BODY FLUID CELL COUNT: CPT

## 2018-12-29 PROCEDURE — 97161 PT EVAL LOW COMPLEX 20 MIN: CPT

## 2018-12-29 PROCEDURE — 82150 ASSAY OF AMYLASE: CPT

## 2018-12-29 PROCEDURE — 93321 DOPPLER ECHO F-UP/LMTD STD: CPT

## 2018-12-29 PROCEDURE — 84165 PROTEIN E-PHORESIS SERUM: CPT

## 2018-12-29 PROCEDURE — C1729: CPT

## 2018-12-29 PROCEDURE — 84100 ASSAY OF PHOSPHORUS: CPT

## 2018-12-29 PROCEDURE — 97530 THERAPEUTIC ACTIVITIES: CPT

## 2018-12-29 PROCEDURE — 88112 CYTOPATH CELL ENHANCE TECH: CPT

## 2018-12-29 PROCEDURE — 33999 UNLISTED PX CARDIAC SURGERY: CPT

## 2018-12-29 PROCEDURE — 82330 ASSAY OF CALCIUM: CPT

## 2018-12-29 PROCEDURE — 82803 BLOOD GASES ANY COMBINATION: CPT

## 2018-12-29 PROCEDURE — 88341 IMHCHEM/IMCYTCHM EA ADD ANTB: CPT

## 2018-12-29 PROCEDURE — 71250 CT THORAX DX C-: CPT

## 2018-12-29 PROCEDURE — 85027 COMPLETE CBC AUTOMATED: CPT

## 2018-12-29 PROCEDURE — 93005 ELECTROCARDIOGRAM TRACING: CPT

## 2018-12-29 PROCEDURE — 82945 GLUCOSE OTHER FLUID: CPT

## 2018-12-29 PROCEDURE — 87015 SPECIMEN INFECT AGNT CONCNTJ: CPT

## 2018-12-29 PROCEDURE — 86901 BLOOD TYPING SEROLOGIC RH(D): CPT

## 2018-12-29 PROCEDURE — 83540 ASSAY OF IRON: CPT

## 2018-12-29 PROCEDURE — 83615 LACTATE (LD) (LDH) ENZYME: CPT

## 2018-12-29 PROCEDURE — 97116 GAIT TRAINING THERAPY: CPT

## 2018-12-29 PROCEDURE — 82728 ASSAY OF FERRITIN: CPT

## 2018-12-29 PROCEDURE — 99285 EMERGENCY DEPT VISIT HI MDM: CPT

## 2018-12-29 PROCEDURE — 83550 IRON BINDING TEST: CPT

## 2018-12-29 PROCEDURE — 80048 BASIC METABOLIC PNL TOTAL CA: CPT

## 2018-12-29 PROCEDURE — 84132 ASSAY OF SERUM POTASSIUM: CPT

## 2018-12-29 PROCEDURE — 82746 ASSAY OF FOLIC ACID SERUM: CPT

## 2018-12-29 PROCEDURE — 84443 ASSAY THYROID STIM HORMONE: CPT

## 2018-12-29 PROCEDURE — 83735 ASSAY OF MAGNESIUM: CPT

## 2018-12-29 RX ORDER — LEVOTHYROXINE SODIUM 125 MCG
1 TABLET ORAL
Qty: 0 | Refills: 0 | COMMUNITY

## 2018-12-29 RX ORDER — SODIUM BICARBONATE 1 MEQ/ML
1 SYRINGE (ML) INTRAVENOUS
Qty: 120 | Refills: 0
Start: 2018-12-29 | End: 2019-01-27

## 2018-12-29 RX ORDER — SIMETHICONE 80 MG/1
1 TABLET, CHEWABLE ORAL
Qty: 90 | Refills: 0
Start: 2018-12-29 | End: 2019-01-27

## 2018-12-29 RX ORDER — TAMSULOSIN HYDROCHLORIDE 0.4 MG/1
1 CAPSULE ORAL
Qty: 30 | Refills: 0
Start: 2018-12-29 | End: 2019-01-27

## 2018-12-29 RX ADMIN — IRON SUCROSE 210 MILLIGRAM(S): 20 INJECTION, SOLUTION INTRAVENOUS at 14:34

## 2018-12-29 RX ADMIN — Medication 1 MILLIGRAM(S): at 13:01

## 2018-12-29 RX ADMIN — Medication 325 MILLIGRAM(S): at 13:01

## 2018-12-29 RX ADMIN — FINASTERIDE 5 MILLIGRAM(S): 5 TABLET, FILM COATED ORAL at 13:01

## 2018-12-29 RX ADMIN — Medication 650 MILLIGRAM(S): at 13:01

## 2018-12-29 RX ADMIN — PANTOPRAZOLE SODIUM 40 MILLIGRAM(S): 20 TABLET, DELAYED RELEASE ORAL at 06:02

## 2018-12-29 RX ADMIN — HEPARIN SODIUM 5000 UNIT(S): 5000 INJECTION INTRAVENOUS; SUBCUTANEOUS at 06:02

## 2018-12-29 RX ADMIN — Medication 75 MICROGRAM(S): at 06:02

## 2018-12-29 RX ADMIN — Medication 650 MILLIGRAM(S): at 06:03

## 2018-12-29 NOTE — PROGRESS NOTE ADULT - NEGATIVE NEUROLOGICAL SYMPTOMS
no paresthesias/no vertigo/no tremors/no headache
no tremors/no headache/no paresthesias/no vertigo
no headache/no paresthesias/no tremors/no vertigo
no tremors/no vertigo/no paresthesias/no headache
no headache/no tremors/no paresthesias/no vertigo
no paresthesias/no headache/no tremors/no vertigo
no tremors/no headache/no paresthesias/no vertigo
no tremors/no paresthesias/no headache/no vertigo
no tremors/no paresthesias/no vertigo/no headache

## 2018-12-29 NOTE — PROGRESS NOTE ADULT - NEGATIVE HEMATOLOGY SYMPTOMS
no gum bleeding/no nose bleeding
no nose bleeding
no gum bleeding/no nose bleeding
no nose bleeding
no gum bleeding/no nose bleeding
no nose bleeding/no gum bleeding

## 2018-12-29 NOTE — PROGRESS NOTE ADULT - NEGATIVE GASTROINTESTINAL SYMPTOMS
no vomiting/no constipation/no diarrhea/no nausea/no abdominal pain
no constipation/no diarrhea
no diarrhea/no constipation
no abdominal pain/no nausea/no diarrhea/no vomiting/no constipation
no abdominal pain/no vomiting/no diarrhea/no nausea/no constipation
no abdominal pain/no vomiting/no nausea/no constipation/no diarrhea
no constipation/no abdominal pain/no nausea/no vomiting/no diarrhea
no diarrhea/no nausea/no vomiting/no abdominal pain/no constipation
no nausea/no vomiting/no diarrhea/no constipation/no abdominal pain

## 2018-12-29 NOTE — PROGRESS NOTE ADULT - NEGATIVE ENMT SYMPTOMS
no hearing difficulty/no tinnitus/no vertigo/no sinus symptoms
no sinus symptoms/no vertigo/no hearing difficulty/no tinnitus
no hearing difficulty/no sinus symptoms/no vertigo/no tinnitus
no hearing difficulty/no tinnitus/no vertigo/no sinus symptoms
no sinus symptoms/no hearing difficulty/no vertigo/no tinnitus
no tinnitus/no vertigo/no hearing difficulty/no sinus symptoms
no vertigo/no sinus symptoms/no hearing difficulty/no tinnitus

## 2018-12-29 NOTE — PROGRESS NOTE ADULT - NEGATIVE ALLERGY TYPES
no reactions to food/no indoor environmental allergies/no reactions to medicines
no reactions to food/no indoor environmental allergies/no reactions to medicines
no indoor environmental allergies/no reactions to medicines/no reactions to food
no indoor environmental allergies/no reactions to food/no reactions to medicines
no indoor environmental allergies/no reactions to food/no reactions to medicines
no indoor environmental allergies/no reactions to medicines/no reactions to food
no reactions to food/no indoor environmental allergies/no reactions to medicines
no reactions to medicines/no reactions to food/no indoor environmental allergies

## 2018-12-29 NOTE — PROGRESS NOTE ADULT - CONSTITUTIONAL DETAILS
no distress
no distress
pale/no distress
no distress
had chest pain earlier/no distress
no distress
no distress/up in chair

## 2018-12-29 NOTE — PROGRESS NOTE ADULT - PROVIDER SPECIALTY LIST ADULT
CCU
Cardiology
Cardiology
Family Medicine
Heme/Onc
Hospitalist
Nephrology
Palliative Care
Cardiology
Hospitalist
Hospitalist
Nephrology
Heme/Onc

## 2018-12-29 NOTE — PROGRESS NOTE ADULT - NEGATIVE OPHTHALMOLOGIC SYMPTOMS
no lacrimation L/no lacrimation R/no diplopia/no irritation L/no irritation R/no photophobia
no lacrimation L/no lacrimation R/no photophobia
no diplopia/no photophobia/no lacrimation R/no irritation L/no irritation R/no lacrimation L
no lacrimation R/no lacrimation L/no irritation L/no irritation R/no diplopia/no photophobia
no diplopia/no photophobia/no irritation R/no irritation L/no lacrimation L/no lacrimation R
no lacrimation L/no lacrimation R/no diplopia/no irritation R/no irritation L/no photophobia
no lacrimation R/no diplopia/no irritation L/no lacrimation L/no photophobia/no irritation R
no lacrimation R/no photophobia/no diplopia/no lacrimation L/no irritation L/no irritation R
no photophobia/no lacrimation L/no lacrimation R/no irritation L/no irritation R/no diplopia

## 2018-12-29 NOTE — PROGRESS NOTE ADULT - NEGATIVE ENDOCRINE SYMPTOMS
no cold intolerance

## 2018-12-29 NOTE — PROGRESS NOTE ADULT - SUBJECTIVE AND OBJECTIVE BOX
Patient is a 85y old  Male who presents with a chief complaint of Worsening creatinine with suprapubic fullness (29 Dec 2018 06:22)       Pt is seen and examined  pt is awake and lying in bed  pt seems comfortable     MEDICATIONS  (STANDING):  epoetin marimar Injectable 26845 Unit(s) IV Push <User Schedule>  ferrous    sulfate 325 milliGRAM(s) Oral daily  finasteride 5 milliGRAM(s) Oral daily  folic acid 1 milliGRAM(s) Oral daily  heparin  Injectable 5000 Unit(s) SubCutaneous every 12 hours  influenza   Vaccine 0.5 milliLiter(s) IntraMuscular once  iron sucrose IVPB 100 milliGRAM(s) IV Intermittent once  levothyroxine 75 MICROGram(s) Oral daily  pantoprazole    Tablet 40 milliGRAM(s) Oral two times a day  sodium bicarbonate 650 milliGRAM(s) Oral four times a day  tamsulosin 0.4 milliGRAM(s) Oral at bedtime    MEDICATIONS  (PRN):  acetaminophen   Tablet .. 650 milliGRAM(s) Oral every 6 hours PRN Mild Pain (1 - 3)  simethicone 80 milliGRAM(s) Chew three times a day PRN Upset Stomach      Allergies    No Known Allergies    Intolerances        Vital Signs Last 24 Hrs  T(C): 36.4 (29 Dec 2018 12:31), Max: 37.5 (28 Dec 2018 21:03)  T(F): 97.6 (29 Dec 2018 12:31), Max: 99.5 (28 Dec 2018 21:03)  HR: 83 (29 Dec 2018 12:31) (83 - 98)  BP: 121/56 (29 Dec 2018 12:31) (107/50 - 121/56)  BP(mean): --  RR: 18 (29 Dec 2018 12:31) (18 - 18)  SpO2: 97% (29 Dec 2018 12:31) (97% - 99%)        LABS:                          7.7    8.8   )-----------( 348      ( 29 Dec 2018 06:21 )             22.6         Mean Cell Volume : 89.6 fl  Mean Cell Hemoglobin : 30.5 pg  Mean Cell Hemoglobin Concentration : 34.1 gm/dL  Auto Neutrophil # : x  Auto Lymphocyte # : x  Auto Monocyte # : x  Auto Eosinophil # : x  Auto Basophil # : x  Auto Neutrophil % : x  Auto Lymphocyte % : x  Auto Monocyte % : x  Auto Eosinophil % : x  Auto Basophil % : x    Serial CBC's  12-29 @ 06:21  Hct-22.6 / Hgb-7.7 / Plat-348 / RBC-2.52 / WBC-8.8          Serial CBC's  12-28 @ 06:32  Hct-25.2 / Hgb-8.0 / Plat-369 / RBC-2.79 / WBC-8.8          Serial CBC's  12-27 @ 06:55  Hct-25.5 / Hgb-8.7 / Plat-366 / RBC-2.85 / WBC-10.4          Serial CBC's  12-26 @ 06:41  Hct-25.1 / Hgb-8.5 / Plat-327 / RBC-2.79 / WBC-9.2            12-29    137  |  100  |  79<H>  ----------------------------<  114<H>  3.9   |  22  |  4.67<H>    Ca    8.9      29 Dec 2018 06:21            WBC Count: 8.8 K/uL (12-29-18 @ 06:21)  Hemoglobin: 7.7 g/dL (12-29-18 @ 06:21)  Hematocrit: 22.6 % (12-29-18 @ 06:21)  Platelet Count - Automated: 348 K/uL (12-29-18 @ 06:21)  WBC Count: 8.8 K/uL (12-28-18 @ 06:32)  Hemoglobin: 8.0 g/dL (12-28-18 @ 06:32)  Hematocrit: 25.2 % (12-28-18 @ 06:32)  Platelet Count - Automated: 369 K/uL (12-28-18 @ 06:32)  WBC Count: 10.4 K/uL (12-27-18 @ 06:55)  Hemoglobin: 8.7 g/dL (12-27-18 @ 06:55)  Hematocrit: 25.5 % (12-27-18 @ 06:55)  Platelet Count - Automated: 366 K/uL (12-27-18 @ 06:55)  WBC Count: 9.2 K/uL (12-26-18 @ 06:41)  Hemoglobin: 8.5 g/dL (12-26-18 @ 06:41)  Hematocrit: 25.1 % (12-26-18 @ 06:41)  Platelet Count - Automated: 327 K/uL (12-26-18 @ 06:41)  WBC Count: 9.4 K/uL (12-25-18 @ 07:09)  Hemoglobin: 8.0 g/dL (12-25-18 @ 07:09)  Hematocrit: 25.2 % (12-25-18 @ 07:09)  Platelet Count - Automated: 317 K/uL (12-25-18 @ 07:09)  Ferritin, Serum: 223 ng/mL (12-24-18 @ 08:23)  Folate, Serum: 5.2 ng/mL (12-24-18 @ 08:23)  Vitamin B12, Serum: 887 pg/mL (12-24-18 @ 08:23)  WBC Count: 7.6 K/uL (12-24-18 @ 04:29)  Hemoglobin: 7.8 g/dL (12-24-18 @ 04:29)  Hematocrit: 23.1 % (12-24-18 @ 04:29)  Platelet Count - Automated: 251 K/uL (12-24-18 @ 04:29)  WBC Count: 7.1 K/uL (12-23-18 @ 05:35)  Hemoglobin: 7.8 g/dL (12-23-18 @ 05:35)  Hematocrit: 22.7 % (12-23-18 @ 05:35)  Platelet Count - Automated: 246 K/uL (12-23-18 @ 05:35)  WBC Count: 8.5 K/uL (12-22-18 @ 05:48)  Hemoglobin: 8.6 g/dL (12-22-18 @ 05:48)  Hematocrit: 25.0 % (12-22-18 @ 05:48)  Platelet Count - Automated: 244 K/uL (12-22-18 @ 05:48)  Ferritin, Serum: 103 ng/mL (12-21-18 @ 17:25)  Iron - Total Binding Capacity.: 157 ug/dL (12-21-18 @ 17:25)  WBC Count: 9.0 K/uL (12-21-18 @ 15:18)  Hemoglobin: 7.4 g/dL (12-21-18 @ 15:18)  Hematocrit: 21.7 % (12-21-18 @ 15:18)  Platelet Count - Automated: 205 K/uL (12-21-18 @ 15:18)  WBC Count: 8.6 K/uL (12-21-18 @ 09:10)  Hemoglobin: 7.6 g/dL (12-21-18 @ 09:10)  Hematocrit: 23.5 % (12-21-18 @ 09:10)  Platelet Count - Automated: 212 K/uL (12-21-18 @ 09:10)  WBC Count: 8.4 K/uL (12-21-18 @ 04:21)  Hemoglobin: 7.7 g/dL (12-21-18 @ 04:21)  Hematocrit: 21.5 % (12-21-18 @ 04:21)  Platelet Count - Automated: 203 K/uL (12-21-18 @ 04:21)  WBC Count: 8.19 K/uL (12-20-18 @ 08:14)  Hemoglobin: 8.3 g/dL (12-20-18 @ 08:14)  Hematocrit: 25.9 % (12-20-18 @ 08:14)  Platelet Count - Automated: 198 K/uL (12-20-18 @ 08:14)  Hemoglobin: 8.5 g/dL (12-19-18 @ 15:21)  Platelet Count - Automated: 224 K/uL (12-19-18 @ 15:21)  Hematocrit: 24.0 % (12-19-18 @ 15:21)  WBC Count: 8.1 K/uL (12-19-18 @ 15:21)      Serum Protein Electrophoresis Interp: Normal Electrophoresis Pattern (12-20 @ 13:26)  Immunofixation, Serum:   No Monoclonal Band Identified (12-20 @ 13:26)                  RADIOLOGY & ADDITIONAL STUDIES:

## 2018-12-29 NOTE — PROGRESS NOTE ADULT - NEGATIVE PSYCHIATRIC SYMPTOMS
no depression/no insomnia/no anxiety
no depression/no insomnia/no anxiety
no anxiety/no depression/no insomnia
no anxiety/no depression/no insomnia
no anxiety/no insomnia/no depression
no anxiety/no insomnia/no depression
no depression/no insomnia/no anxiety

## 2018-12-29 NOTE — PROGRESS NOTE ADULT - ASSESSMENT
84 yo man with  history of gastric CA  has never tx'd with chemotherapy and  was sent to ER for staging. CT showed large pericardial effusion. Pt is now in PICU with a drain in chest draining pericardial fluid.    12-21-18: R/O iron deficiency: check ferritin, b12/ folate    12-22-18: received Venofer this morning. Persistent odynophagia. await pathology of pericardial fluid.    12-23-18: No more odynophagia.. HCT decreased. Repeat Ferritin. Check B12/ Folate. If Ferritin is adequate, Pt will be given Procrit.    12-24-18; Ferritin > 220,  folate 5.2, B12 > 880. on Procrit, Will start Folate 1 mg QD    12-25-18: Pt looks better. Cytology of pericardial fluid is still pending. Byrnes in place. On Ferrous sulfate, Folate and Procrit.    12-26-18: Spoke to palliative team; Continue present regimen.    12-27-18: abd discomfort at times, Consider Tylenol for pain. H/H stable. Cr trending down.    12-28-18: Cytology of pericardial fluid is negative of malignancy. Pt had recurrent urinary retention and has Byrnes now. Cr went up.    12-29-18: Byrnes is draining clear urine.  Cr went down 4.67. HCT down to 22.6. Pt is going home. Will see him next week to give him Procrit.

## 2018-12-29 NOTE — PROGRESS NOTE ADULT - PSYCHIATRIC DETAILS
normal affect/normal behavior
normal affect/normal behavior
normal behavior/normal affect
normal affect/normal behavior
normal affect/normal behavior
normal behavior/normal affect
normal behavior/normal affect
normal affect/normal behavior

## 2018-12-29 NOTE — PROGRESS NOTE ADULT - RS GEN PE MLT RESP DETAILS PC
good air movement
clear to auscultation bilaterally
rales
rales
clear to auscultation bilaterally

## 2018-12-29 NOTE — PROGRESS NOTE ADULT - NEGATIVE LYMPHATIC SYMPTOMS
no enlarged lymph nodes

## 2018-12-29 NOTE — PROGRESS NOTE ADULT - NEGATIVE GENERAL SYMPTOMS
Pale, chronically ill/no sweating/no fever/no chills
no fever/no sweating/no chills
no chills/no sweating/no fever
no fever/Pale, chronically ill/no sweating/no chills
no fever/no chills/no sweating
no sweating/no chills/no fever
no sweating/no fever/no chills
no sweating/no fever/no chills
no chills/Pale, chronically ill/no fever/no sweating

## 2018-12-29 NOTE — PROGRESS NOTE ADULT - NEGATIVE RESPIRATORY AND THORAX SYMPTOMS
no wheezing/no dyspnea/no cough
no cough/no dyspnea/no wheezing
no cough/no wheezing/no dyspnea
no cough/no dyspnea/no wheezing
no dyspnea/no cough/no wheezing
no dyspnea/no wheezing/no cough
no dyspnea/no wheezing/no cough
no wheezing/no dyspnea/no cough
no wheezing/no dyspnea/no cough

## 2018-12-29 NOTE — PROGRESS NOTE ADULT - NEGATIVE MUSCULOSKELETAL SYMPTOMS
no leg pain L/no arthritis/no muscle cramps/no myalgia/no leg pain R/no joint swelling
no myalgia/no leg pain R/no muscle cramps/no joint swelling/no leg pain L
no arthritis/no joint swelling/no leg pain L/no myalgia/no muscle cramps/no leg pain R
no myalgia/no leg pain R/no muscle cramps/no leg pain L/no joint swelling
no arthritis/no myalgia/no muscle cramps/no joint swelling/no leg pain L/no leg pain R
no joint swelling/no myalgia/no leg pain L/no arthritis/no leg pain R/no muscle cramps
no leg pain L/no joint swelling/no leg pain R/no arthritis/no myalgia/no muscle cramps
no leg pain R/no myalgia/no joint swelling/no arthritis/no muscle cramps/no leg pain L
no muscle cramps/no leg pain R/no arthritis/no joint swelling/no myalgia/no leg pain L

## 2018-12-29 NOTE — PROGRESS NOTE ADULT - ASSESSMENT
Discharge to home today. See med list and summary. STELLA resolving well with ayala. See Nephrology in one week. TOV as outpatient.

## 2018-12-29 NOTE — PROGRESS NOTE ADULT - GASTROINTESTINAL DETAILS
no distention/soft/no masses palpable/nontender
soft/no rigidity/no guarding/nontender/no distention/no masses palpable
soft/no rigidity/no masses palpable/nontender/no rebound tenderness/no distention/no guarding
no distention/no masses palpable/nontender/soft
nontender/no distention/soft/no masses palpable
soft/nontender/no masses palpable/no distention
no masses palpable/nontender/soft/no distention
soft/nontender/no masses palpable/no distention
nontender/no distention/no masses palpable/no rebound tenderness/soft

## 2018-12-29 NOTE — PROGRESS NOTE ADULT - PROBLEM SELECTOR PROBLEM 1
Malignant neoplasm of stomach, unspecified location
Pericardial effusion
Pericardial effusion
Pericardial effusion with cardiac tamponade
Pericardial tamponade
Pericardial effusion
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

## 2018-12-29 NOTE — PROGRESS NOTE ADULT - RESPIRATORY
Breath Sounds equal & clear to percussion & auscultation, no accessory muscle use
detailed exam

## 2018-12-29 NOTE — PROGRESS NOTE ADULT - SUBJECTIVE AND OBJECTIVE BOX
INTERVAL HPI/OVERNIGHT EVENTS:  Pt seen and examined at bedside.     Allergies/Intolerance: No Known Allergies      MEDICATIONS  (STANDING):  epoetin marimar Injectable 03866 Unit(s) IV Push <User Schedule>  ferrous    sulfate 325 milliGRAM(s) Oral daily  finasteride 5 milliGRAM(s) Oral daily  folic acid 1 milliGRAM(s) Oral daily  heparin  Injectable 5000 Unit(s) SubCutaneous every 12 hours  influenza   Vaccine 0.5 milliLiter(s) IntraMuscular once  iron sucrose IVPB 100 milliGRAM(s) IV Intermittent once  levothyroxine 75 MICROGram(s) Oral daily  pantoprazole    Tablet 40 milliGRAM(s) Oral two times a day  sodium bicarbonate 650 milliGRAM(s) Oral four times a day  tamsulosin 0.4 milliGRAM(s) Oral at bedtime    MEDICATIONS  (PRN):  acetaminophen   Tablet .. 650 milliGRAM(s) Oral every 6 hours PRN Mild Pain (1 - 3)  simethicone 80 milliGRAM(s) Chew three times a day PRN Upset Stomach        ROS: all systems reviewed and wnl      PHYSICAL EXAMINATION:  Vital Signs Last 24 Hrs  T(C): 36.8 (29 Dec 2018 04:40), Max: 37.5 (28 Dec 2018 21:03)  T(F): 98.3 (29 Dec 2018 04:40), Max: 99.5 (28 Dec 2018 21:03)  HR: 86 (29 Dec 2018 04:40) (86 - 98)  BP: 107/50 (29 Dec 2018 04:40) (91/56 - 114/60)  BP(mean): --  RR: 18 (29 Dec 2018 04:40) (18 - 18)  SpO2: 99% (29 Dec 2018 04:40) (97% - 99%)  CAPILLARY BLOOD GLUCOSE      POCT Blood Glucose.: 146 mg/dL (28 Dec 2018 08:37)      12-27 @ 07:01  -  12-28 @ 07:00  --------------------------------------------------------  IN: 1770 mL / OUT: 1300 mL / NET: 470 mL    12-28 @ 07:01  -  12-29 @ 06:22  --------------------------------------------------------  IN: 1140 mL / OUT: 2650 mL / NET: -1510 mL        GENERAL:   NECK: supple, No JVD  CHEST/LUNG: clear to auscultation bilaterally; no rales, rhonchi, or wheezing b/l  HEART: normal S1, S2  ABDOMEN: BS+, soft, ND, NT   EXTREMITIES:  pulses palpable; no clubbing, cyanosis, or edema b/l LEs  SKIN: no rashes or lesions      LABS:                        8.0    8.8   )-----------( 369      ( 28 Dec 2018 06:32 )             25.2     12-28    134<L>  |  98  |  85<H>  ----------------------------<  165<H>  4.7   |  21<L>  |  5.02<H>    Ca    9.2      28 Dec 2018 06:32 INTERVAL HPI/OVERNIGHT EVENTS:  Pt seen and examined at bedside.     Allergies/Intolerance: No Known Allergies      MEDICATIONS  (STANDING):  epoetin marimar Injectable 28857 Unit(s) IV Push <User Schedule>  ferrous    sulfate 325 milliGRAM(s) Oral daily  finasteride 5 milliGRAM(s) Oral daily  folic acid 1 milliGRAM(s) Oral daily  heparin  Injectable 5000 Unit(s) SubCutaneous every 12 hours  influenza   Vaccine 0.5 milliLiter(s) IntraMuscular once  iron sucrose IVPB 100 milliGRAM(s) IV Intermittent once  levothyroxine 75 MICROGram(s) Oral daily  pantoprazole    Tablet 40 milliGRAM(s) Oral two times a day  sodium bicarbonate 650 milliGRAM(s) Oral four times a day  tamsulosin 0.4 milliGRAM(s) Oral at bedtime    MEDICATIONS  (PRN):  acetaminophen   Tablet .. 650 milliGRAM(s) Oral every 6 hours PRN Mild Pain (1 - 3)  simethicone 80 milliGRAM(s) Chew three times a day PRN Upset Stomach        ROS: all systems reviewed and wnl      PHYSICAL EXAMINATION:  Vital Signs Last 24 Hrs  T(C): 36.8 (29 Dec 2018 04:40), Max: 37.5 (28 Dec 2018 21:03)  T(F): 98.3 (29 Dec 2018 04:40), Max: 99.5 (28 Dec 2018 21:03)  HR: 86 (29 Dec 2018 04:40) (86 - 98)  BP: 107/50 (29 Dec 2018 04:40) (91/56 - 114/60)  BP(mean): --  RR: 18 (29 Dec 2018 04:40) (18 - 18)  SpO2: 99% (29 Dec 2018 04:40) (97% - 99%)  CAPILLARY BLOOD GLUCOSE      POCT Blood Glucose.: 146 mg/dL (28 Dec 2018 08:37)      12-27 @ 07:01  -  12-28 @ 07:00  --------------------------------------------------------  IN: 1770 mL / OUT: 1300 mL / NET: 470 mL    12-28 @ 07:01  -  12-29 @ 06:22  --------------------------------------------------------  IN: 1140 mL / OUT: 2650 mL / NET: -1510 mL        GENERAL: stable, tolerates PO diet well   NECK: supple, No JVD  CHEST/LUNG: clear to auscultation bilaterally; no rales, rhonchi, or wheezing b/l  HEART: normal S1, S2  ABDOMEN: BS+, soft, ND, NT   EXTREMITIES:  pulses palpable; no clubbing, cyanosis, or edema b/l LEs  SKIN: no rashes or lesions      LABS:                        8.0    8.8   )-----------( 369      ( 28 Dec 2018 06:32 )             25.2     12-28    134<L>  |  98  |  85<H>  ----------------------------<  165<H>  4.7   |  21<L>  |  5.02<H>    Ca    9.2      28 Dec 2018 06:32

## 2018-12-29 NOTE — PROGRESS NOTE ADULT - NEGATIVE ALLERGIC REACTIONS
no respiratory distress

## 2018-12-29 NOTE — PROGRESS NOTE ADULT - REASON FOR ADMISSION
Worsening creatinine with suprapubic fullness
Pericardial effusion w/ tamponade
Worsening creatinine with suprapubic fullness

## 2018-12-29 NOTE — PROGRESS NOTE ADULT - [facility] HE PE MLT D E PC
regular rate and rhythm
No more catheter in chest/regular rate and rhythm
regular rate and rhythm
regular rate and rhythm/catheter in chest draining clear fluid

## 2019-02-09 LAB
CULTURE RESULTS: SIGNIFICANT CHANGE UP
SPECIMEN SOURCE: SIGNIFICANT CHANGE UP

## 2019-03-12 NOTE — ED PROVIDER NOTE - PHYSICAL EXAMINATION
[FreeTextEntry1] : Ramón Erickson MD A&Ox3 mild distress, abdominal distention +ttp suprapubic. CN 2-12 grossly intact without focal neurologic defict. PERRLA, EOMI. Heart RRR no murmur. No JVD. No peripheral edema. Lungs CTA b/l no wheezes, rhonchi, rales. Peripheral pulses present and equal b/l. Head NCAT. Skin normal for race.

## 2019-04-10 NOTE — PATIENT PROFILE ADULT - CENTRAL VENOUS CATHETER/PICC LINE
[General Appearance - Well Developed] : well developed [General Appearance - Well Nourished] : well nourished [Normal Appearance] : normal appearance [Well Groomed] : well groomed [General Appearance - In No Acute Distress] : no acute distress [Edema] : no peripheral edema [Respiration, Rhythm And Depth] : normal respiratory rhythm and effort [Exaggerated Use Of Accessory Muscles For Inspiration] : no accessory muscle use [Abdomen Soft] : soft [Abdomen Tenderness] : non-tender [Costovertebral Angle Tenderness] : no ~M costovertebral angle tenderness [Urethral Meatus] : meatus normal [Scrotum] : the scrotum was normal [Normal Station and Gait] : the gait and station were normal for the patient's age [Skin Color & Pigmentation] : normal skin color and pigmentation [] : no rash [No Focal Deficits] : no focal deficits [Oriented To Time, Place, And Person] : oriented to person, place, and time [Affect] : the affect was normal [Mood] : the mood was normal no [Not Anxious] : not anxious [Femoral Lymph Nodes Enlarged Bilaterally] : femoral [Inguinal Lymph Nodes Enlarged Bilaterally] : inguinal

## 2019-09-24 ENCOUNTER — INPATIENT (INPATIENT)
Facility: HOSPITAL | Age: 84
LOS: 7 days | DRG: 177 | End: 2019-10-02
Attending: FAMILY MEDICINE | Admitting: INTERNAL MEDICINE
Payer: OTHER MISCELLANEOUS

## 2019-09-24 VITALS — DIASTOLIC BLOOD PRESSURE: 48 MMHG | HEART RATE: 86 BPM | OXYGEN SATURATION: 98 % | SYSTOLIC BLOOD PRESSURE: 105 MMHG

## 2019-09-24 DIAGNOSIS — N39.0 URINARY TRACT INFECTION, SITE NOT SPECIFIED: ICD-10-CM

## 2019-09-24 LAB
ALBUMIN SERPL ELPH-MCNC: 2.2 G/DL — LOW (ref 3.3–5)
ALP SERPL-CCNC: 130 U/L — HIGH (ref 40–120)
ALT FLD-CCNC: 9 U/L — LOW (ref 10–45)
ANION GAP SERPL CALC-SCNC: 11 MMOL/L — SIGNIFICANT CHANGE UP (ref 5–17)
APPEARANCE UR: ABNORMAL
APTT BLD: 28.6 SEC — SIGNIFICANT CHANGE UP (ref 27.5–36.3)
AST SERPL-CCNC: 11 U/L — SIGNIFICANT CHANGE UP (ref 10–40)
BACTERIA # UR AUTO: ABNORMAL
BASE EXCESS BLDV CALC-SCNC: -0.7 MMOL/L — SIGNIFICANT CHANGE UP (ref -2–2)
BASOPHILS # BLD AUTO: 0 K/UL — SIGNIFICANT CHANGE UP (ref 0–0.2)
BASOPHILS NFR BLD AUTO: 0 % — SIGNIFICANT CHANGE UP (ref 0–2)
BILIRUB SERPL-MCNC: 0.2 MG/DL — SIGNIFICANT CHANGE UP (ref 0.2–1.2)
BILIRUB UR-MCNC: NEGATIVE — SIGNIFICANT CHANGE UP
BUN SERPL-MCNC: 30 MG/DL — HIGH (ref 7–23)
CA-I SERPL-SCNC: 1.31 MMOL/L — HIGH (ref 1.12–1.3)
CALCIUM SERPL-MCNC: 8.9 MG/DL — SIGNIFICANT CHANGE UP (ref 8.4–10.5)
CHLORIDE BLDV-SCNC: 109 MMOL/L — HIGH (ref 96–108)
CHLORIDE SERPL-SCNC: 104 MMOL/L — SIGNIFICANT CHANGE UP (ref 96–108)
CO2 BLDV-SCNC: 23 MMOL/L — SIGNIFICANT CHANGE UP (ref 22–30)
CO2 SERPL-SCNC: 23 MMOL/L — SIGNIFICANT CHANGE UP (ref 22–31)
COLOR SPEC: SIGNIFICANT CHANGE UP
CREAT SERPL-MCNC: 1.77 MG/DL — HIGH (ref 0.5–1.3)
DIFF PNL FLD: ABNORMAL
EOSINOPHIL # BLD AUTO: 0 K/UL — SIGNIFICANT CHANGE UP (ref 0–0.5)
EOSINOPHIL NFR BLD AUTO: 0.4 % — SIGNIFICANT CHANGE UP (ref 0–6)
EPI CELLS # UR: 0 — SIGNIFICANT CHANGE UP
GAS PNL BLDV: 132 MMOL/L — LOW (ref 135–145)
GAS PNL BLDV: SIGNIFICANT CHANGE UP
GLUCOSE BLDV-MCNC: 126 MG/DL — HIGH (ref 70–99)
GLUCOSE SERPL-MCNC: 132 MG/DL — HIGH (ref 70–99)
GLUCOSE UR QL: NEGATIVE — SIGNIFICANT CHANGE UP
HCO3 BLDV-SCNC: 22 MMOL/L — SIGNIFICANT CHANGE UP (ref 21–29)
HCT VFR BLD CALC: 38.4 % — LOW (ref 39–50)
HCT VFR BLDA CALC: 35 % — LOW (ref 39–50)
HGB BLD CALC-MCNC: 11.3 G/DL — LOW (ref 13–17)
HGB BLD-MCNC: 11.8 G/DL — LOW (ref 13–17)
HYALINE CASTS # UR AUTO: 0 /LPF — SIGNIFICANT CHANGE UP (ref 0–7)
INR BLD: 1.03 RATIO — SIGNIFICANT CHANGE UP (ref 0.88–1.16)
KETONES UR-MCNC: NEGATIVE — SIGNIFICANT CHANGE UP
LACTATE BLDV-MCNC: 1 MMOL/L — SIGNIFICANT CHANGE UP (ref 0.7–2)
LEUKOCYTE ESTERASE UR-ACNC: ABNORMAL
LYMPHOCYTES # BLD AUTO: 1 K/UL — SIGNIFICANT CHANGE UP (ref 1–3.3)
LYMPHOCYTES # BLD AUTO: 8.6 % — LOW (ref 13–44)
MCHC RBC-ENTMCNC: 26.5 PG — LOW (ref 27–34)
MCHC RBC-ENTMCNC: 30.6 GM/DL — LOW (ref 32–36)
MCV RBC AUTO: 86.5 FL — SIGNIFICANT CHANGE UP (ref 80–100)
MONOCYTES # BLD AUTO: 0.8 K/UL — SIGNIFICANT CHANGE UP (ref 0–0.9)
MONOCYTES NFR BLD AUTO: 6.5 % — SIGNIFICANT CHANGE UP (ref 2–14)
NEUTROPHILS # BLD AUTO: 9.9 K/UL — HIGH (ref 1.8–7.4)
NEUTROPHILS NFR BLD AUTO: 84.5 % — HIGH (ref 43–77)
NITRITE UR-MCNC: POSITIVE
PCO2 BLDV: 32 MMHG — LOW (ref 35–50)
PH BLDV: 7.45 — SIGNIFICANT CHANGE UP (ref 7.35–7.45)
PH UR: 5.5 — SIGNIFICANT CHANGE UP (ref 5–8)
PLATELET # BLD AUTO: 325 K/UL — SIGNIFICANT CHANGE UP (ref 150–400)
PO2 BLDV: 126 MMHG — HIGH (ref 25–45)
POTASSIUM BLDV-SCNC: 4.2 MMOL/L — SIGNIFICANT CHANGE UP (ref 3.5–5.3)
POTASSIUM SERPL-MCNC: 4.7 MMOL/L — SIGNIFICANT CHANGE UP (ref 3.5–5.3)
POTASSIUM SERPL-SCNC: 4.7 MMOL/L — SIGNIFICANT CHANGE UP (ref 3.5–5.3)
PROT SERPL-MCNC: 4.9 G/DL — LOW (ref 6–8.3)
PROT UR-MCNC: ABNORMAL
PROTHROM AB SERPL-ACNC: 11.8 SEC — SIGNIFICANT CHANGE UP (ref 10–12.9)
RBC # BLD: 4.44 M/UL — SIGNIFICANT CHANGE UP (ref 4.2–5.8)
RBC # FLD: 15.3 % — HIGH (ref 10.3–14.5)
RBC CASTS # UR COMP ASSIST: 20 /HPF — HIGH (ref 0–4)
SAO2 % BLDV: 98 % — HIGH (ref 67–88)
SODIUM SERPL-SCNC: 138 MMOL/L — SIGNIFICANT CHANGE UP (ref 135–145)
SP GR SPEC: 1.02 — SIGNIFICANT CHANGE UP (ref 1.01–1.02)
UROBILINOGEN FLD QL: NEGATIVE — SIGNIFICANT CHANGE UP
WBC # BLD: 11.7 K/UL — HIGH (ref 3.8–10.5)
WBC # FLD AUTO: 11.7 K/UL — HIGH (ref 3.8–10.5)
WBC UR QL: >50

## 2019-09-24 PROCEDURE — 99497 ADVNCD CARE PLAN 30 MIN: CPT | Mod: 25

## 2019-09-24 PROCEDURE — 93010 ELECTROCARDIOGRAM REPORT: CPT

## 2019-09-24 PROCEDURE — 71045 X-RAY EXAM CHEST 1 VIEW: CPT | Mod: 26

## 2019-09-24 PROCEDURE — 99284 EMERGENCY DEPT VISIT MOD MDM: CPT

## 2019-09-24 PROCEDURE — 99223 1ST HOSP IP/OBS HIGH 75: CPT

## 2019-09-24 RX ORDER — PANTOPRAZOLE SODIUM 20 MG/1
1 TABLET, DELAYED RELEASE ORAL
Qty: 60 | Refills: 0

## 2019-09-24 RX ORDER — LEVOTHYROXINE SODIUM 125 MCG
75 TABLET ORAL DAILY
Refills: 0 | Status: DISCONTINUED | OUTPATIENT
Start: 2019-09-24 | End: 2019-09-29

## 2019-09-24 RX ORDER — SODIUM CHLORIDE 9 MG/ML
500 INJECTION INTRAMUSCULAR; INTRAVENOUS; SUBCUTANEOUS ONCE
Refills: 0 | Status: COMPLETED | OUTPATIENT
Start: 2019-09-24 | End: 2019-09-24

## 2019-09-24 RX ORDER — LEVOTHYROXINE SODIUM 125 MCG
1 TABLET ORAL
Qty: 30 | Refills: 0

## 2019-09-24 RX ORDER — PIPERACILLIN AND TAZOBACTAM 4; .5 G/20ML; G/20ML
3.38 INJECTION, POWDER, LYOPHILIZED, FOR SOLUTION INTRAVENOUS ONCE
Refills: 0 | Status: COMPLETED | OUTPATIENT
Start: 2019-09-24 | End: 2019-09-25

## 2019-09-24 RX ORDER — ERYTHROPOIETIN 10000 [IU]/ML
10000 INJECTION, SOLUTION INTRAVENOUS; SUBCUTANEOUS
Qty: 0 | Refills: 0 | DISCHARGE

## 2019-09-24 RX ORDER — TAMSULOSIN HYDROCHLORIDE 0.4 MG/1
1 CAPSULE ORAL
Qty: 0 | Refills: 0 | DISCHARGE

## 2019-09-24 RX ORDER — AZITHROMYCIN 500 MG/1
500 TABLET, FILM COATED ORAL ONCE
Refills: 0 | Status: COMPLETED | OUTPATIENT
Start: 2019-09-24 | End: 2019-09-24

## 2019-09-24 RX ORDER — FOLIC ACID 0.8 MG
1 TABLET ORAL
Qty: 30 | Refills: 0

## 2019-09-24 RX ORDER — PIPERACILLIN AND TAZOBACTAM 4; .5 G/20ML; G/20ML
3.38 INJECTION, POWDER, LYOPHILIZED, FOR SOLUTION INTRAVENOUS EVERY 8 HOURS
Refills: 0 | Status: DISCONTINUED | OUTPATIENT
Start: 2019-09-24 | End: 2019-10-01

## 2019-09-24 RX ORDER — PANTOPRAZOLE SODIUM 20 MG/1
40 TABLET, DELAYED RELEASE ORAL
Refills: 0 | Status: DISCONTINUED | OUTPATIENT
Start: 2019-09-24 | End: 2019-09-26

## 2019-09-24 RX ORDER — CEFTRIAXONE 500 MG/1
1000 INJECTION, POWDER, FOR SOLUTION INTRAMUSCULAR; INTRAVENOUS ONCE
Refills: 0 | Status: COMPLETED | OUTPATIENT
Start: 2019-09-24 | End: 2019-09-24

## 2019-09-24 RX ORDER — SENNOSIDES/DOCUSATE SODIUM 8.6MG-50MG
2 TABLET ORAL
Qty: 0 | Refills: 0 | DISCHARGE

## 2019-09-24 RX ORDER — IPRATROPIUM BROMIDE 0.2 MG/ML
500 SOLUTION, NON-ORAL INHALATION EVERY 6 HOURS
Refills: 0 | Status: DISCONTINUED | OUTPATIENT
Start: 2019-09-24 | End: 2019-09-26

## 2019-09-24 RX ORDER — FINASTERIDE 5 MG/1
1 TABLET, FILM COATED ORAL
Qty: 0 | Refills: 0 | DISCHARGE

## 2019-09-24 RX ORDER — HEPARIN SODIUM 5000 [USP'U]/ML
5000 INJECTION INTRAVENOUS; SUBCUTANEOUS
Refills: 0 | Status: DISCONTINUED | OUTPATIENT
Start: 2019-09-24 | End: 2019-10-02

## 2019-09-24 RX ORDER — FOLIC ACID 0.8 MG
1 TABLET ORAL DAILY
Refills: 0 | Status: DISCONTINUED | OUTPATIENT
Start: 2019-09-24 | End: 2019-09-29

## 2019-09-24 RX ORDER — FERROUS SULFATE 325(65) MG
1 TABLET ORAL
Qty: 30 | Refills: 0

## 2019-09-24 RX ORDER — DILTIAZEM HCL 120 MG
30 CAPSULE, EXT RELEASE 24 HR ORAL EVERY 8 HOURS
Refills: 0 | Status: DISCONTINUED | OUTPATIENT
Start: 2019-09-24 | End: 2019-09-29

## 2019-09-24 RX ORDER — FINASTERIDE 5 MG/1
5 TABLET, FILM COATED ORAL DAILY
Refills: 0 | Status: DISCONTINUED | OUTPATIENT
Start: 2019-09-24 | End: 2019-09-26

## 2019-09-24 RX ORDER — FINASTERIDE 5 MG/1
1 TABLET, FILM COATED ORAL
Qty: 30 | Refills: 0

## 2019-09-24 RX ADMIN — AZITHROMYCIN 500 MILLIGRAM(S): 500 TABLET, FILM COATED ORAL at 21:15

## 2019-09-24 RX ADMIN — SODIUM CHLORIDE 500 MILLILITER(S): 9 INJECTION INTRAMUSCULAR; INTRAVENOUS; SUBCUTANEOUS at 22:16

## 2019-09-24 RX ADMIN — SODIUM CHLORIDE 500 MILLILITER(S): 9 INJECTION INTRAMUSCULAR; INTRAVENOUS; SUBCUTANEOUS at 20:44

## 2019-09-24 RX ADMIN — AZITHROMYCIN 250 MILLIGRAM(S): 500 TABLET, FILM COATED ORAL at 20:11

## 2019-09-24 RX ADMIN — CEFTRIAXONE 100 MILLIGRAM(S): 500 INJECTION, POWDER, FOR SOLUTION INTRAMUSCULAR; INTRAVENOUS at 19:36

## 2019-09-24 RX ADMIN — SODIUM CHLORIDE 500 MILLILITER(S): 9 INJECTION INTRAMUSCULAR; INTRAVENOUS; SUBCUTANEOUS at 19:36

## 2019-09-24 RX ADMIN — CEFTRIAXONE 1000 MILLIGRAM(S): 500 INJECTION, POWDER, FOR SOLUTION INTRAMUSCULAR; INTRAVENOUS at 20:11

## 2019-09-24 NOTE — H&P ADULT - ASSESSMENT
86M, bilingual Pashto/english speaking, c hx advanced gastric cancer never on chemo, on home hospice ([facility]), ?CKD, gastric ulcer, pericardial effusion s/p tap (Dec '18), obstructive uropathy s/p chronic ayala (Dec '18), dysphagia on pureed with thin liquids, hypothyroidism, anemia receiving IV iron and IV albumin infusion by heme, pw respiratory distress 2/2 PNA vs aspiration, c/b acute metabolic encephalopathy, new Afib c rvr.

## 2019-09-24 NOTE — ED PROVIDER NOTE - PHYSICAL EXAMINATION
GENERAL: Elderly male, cachetic in appearance, tachycardic, tachypneic otherwise Vital signs are within normal limits  HEENT: NC/AT, dry mucous membranes  LUNG: crackles diffusely. On 2L NC  CV: tachycardic, regular rate, no m/r/g appreciated, Pulses- Radial: 2+ b/l  ABDOMEN: Soft, NTND  NEURO: nonverbal today  SKIN: Warm, dry, well perfused, no evidence of rash

## 2019-09-24 NOTE — H&P ADULT - HISTORY OF PRESENT ILLNESS
86M, bilingual Angolan/english speaking, c hx advanced gastric cancer never on chemo, on home hospice ([facility]), ?CKD, gastric ulcer, pericardial effusion s/p tap (Dec '18), obstructive uropathy s/p chronic ayala (Dec '18), dysphagia on pureed with thin liquids, hypothyroidism, anemia receiving IV iron and IV albumin infusion by heme, presents from home with poss PNA on home CXR, coughing for 2 days, confusion for 1 day.    History obtained from pt's son Isidro and daughter in law at bedside. Also spoke with pt's HCP/son Jameson Collier ([phone]) over phone. At baseline, pt ambulates slowly with walker and able to walk to the bathroom. Pt is also conversant in both english and Angolan. Pt has not required any hospitalizations since last Dec '18, and was in usual state of health until 2 days ago, when he started to cough more. For the past 1 day, pt has been confused, not speaking, not eating. Today, family noticed significant upper airway noises. CXR was performed at home, which showed possible pna, which prompted family to bring pt into the hospital. Family states pt vomits any food that is not pureed. Pt has otherwise not been complaining of anything. ROS as below.    VS: Tm 98.6, P 138, /48, R 25, 100% on 4L NC  In the ED, received NS 1L, azithro, ceftriaxone

## 2019-09-24 NOTE — H&P ADULT - PROBLEM SELECTOR PLAN 7
- pt's son Jameson Collier is HCP  - MOLST form placed in chart  - DNR/DNI confirmed with pt's family  - family wish to continue to treat medical problems non-invasively. ok for blood draws, IVF, antibiotics, oral medications - pt's son Jameson Collier is HCP  - MOLST form placed in chart  - DNR/DNI confirmed with pt's family  - family wish to continue to treat medical problems non-invasively. ok for blood draws, IVF, antibiotics, oral medications  - time spent 30 min

## 2019-09-24 NOTE — H&P ADULT - NSHPLABSRESULTS_GEN_ALL_CORE
Personally reviewed labs.   Personally reviewed imaging.   Personally reviewed EKG.                           11.8   11.7  )-----------( 325      ( 24 Sep 2019 18:54 )             38.4           138  |  104  |  30<H>  ----------------------------<  132<H>  4.7   |  23  |  1.77<H>    Ca    8.9      24 Sep 2019 18:54    TPro  4.9<L>  /  Alb  2.2<L>  /  TBili  0.2  /  DBili  x   /  AST  11  /  ALT  9<L>  /  AlkPhos  130<H>              LIVER FUNCTIONS - ( 24 Sep 2019 18:54 )  Alb: 2.2 g/dL / Pro: 4.9 g/dL / ALK PHOS: 130 U/L / ALT: 9 U/L / AST: 11 U/L / GGT: x             PT/INR - ( 24 Sep 2019 18:54 )   PT: 11.8 sec;   INR: 1.03 ratio         PTT - ( 24 Sep 2019 18:54 )  PTT:28.6 sec    Urinalysis Basic - ( 24 Sep 2019 19:17 )    Color: Light Yellow / Appearance: Turbid / S.018 / pH: x  Gluc: x / Ketone: Negative  / Bili: Negative / Urobili: Negative   Blood: x / Protein: 100 mg/dL / Nitrite: Positive   Leuk Esterase: Large / RBC: 20 /hpf / WBC >50   Sq Epi: x / Non Sq Epi: 0 / Bacteria: Many

## 2019-09-24 NOTE — ED ADULT NURSE NOTE - OBJECTIVE STATEMENT
86 yrs old male with h/o stomach cancer , BPH   present to the ER for to be treated for PNA. As per daughter in law pt was coughing today and was unable to tolerate oral intake. Report is that hospice came in and an Xray was done which revealed PNA. Pt was sent to the ER to be treated with IV antibiotic. Pt is DNR/ DNR . No reported fever or chills. Byrnes catheter in place from home , which appeared cloudy with a foul odor.

## 2019-09-24 NOTE — H&P ADULT - PROBLEM SELECTOR PLAN 2
- cont zosyn empirically for now  - CT chest  - will cont dysphagia diet with pureed and nectar thickened for pleasure feeds

## 2019-09-24 NOTE — ED PROVIDER NOTE - NS ED ROS FT
CONSTITUTIONAL: No fevers, chills, fatigue  MOUTH/THROAT: No sore throat  CV: No chest pain, palpitations  PULM: COUGH. No shortness of breath  GI: No abdominal pain, nausea, vomiting  : No dysuria, hematuria, polyuria

## 2019-09-24 NOTE — H&P ADULT - NSHPSOCIALHISTORY_GEN_ALL_CORE
Social History:    Marital Status: ( x ) , (  ) Single, (  ) , (  ) , (  )   # of Children: 5 sons, 1 of whom recently passed away of hodgkin's lymphoma  Lives with: (  ) alone, ( x ) children, ( x ) spouse, (  ) parents, (  ) siblings, (  ) friends, (  ) other:   Occupation:     Substance Use/Illicit Drugs: (  ) never used vs other:   Tobacco Usage: ( x ) never smoked, (  ) former smoker, (  ) current smoker and Total Pack-Years:   Last Alcohol Usage/Frequency/Amount/Withdrawal/Hx of Abuse:  glass of wine 2 days ago  Foreign travel:   Animal exposure:

## 2019-09-24 NOTE — ED ADULT NURSE NOTE - ED STAT RN HANDOFF DETAILS 2
report given to receiving ed holding CELINA Shell report given to receiving ed holding RN Vikki Miles

## 2019-09-24 NOTE — H&P ADULT - PROBLEM SELECTOR PLAN 3
- new onset?  - cont dilt for rate control  - will need discussion with family and PMD regarding need for a/c given high risk of falls

## 2019-09-24 NOTE — H&P ADULT - PROBLEM SELECTOR PLAN 5
- unknown baseline, but significantly improved compared to prior hospitalization with chronic ayala  - treat UTI with zosyn as above

## 2019-09-24 NOTE — ED PROVIDER NOTE - OBJECTIVE STATEMENT
Hx provided by family at bedside.    86 y.o. male hx of stomach cancer on home hospice presents to the ED from home for a possible pneumonia seen on CXR done today at home. Patient has been having a worsening nonproductive cough since yesterday which prompted the CXR today. Denies any fevers, chills, chest pain, shortness of breath, abdominal pain, back pain. Patient normally talks at baseline but was not talking yesterday or today which prompted further evaluation. Diet is mainly broth but as per family, do not think patient has aspirated. He was put on 2L O2 NC at home today by the visiting nurse.

## 2019-09-24 NOTE — ED PROVIDER NOTE - CLINICAL SUMMARY MEDICAL DECISION MAKING FREE TEXT BOX
86 y.o. male hx of stomach cancer, on home hospice brought to the ED after possible pneumonia seen on home CXR, has not been hospitalized in the last 3 months, tachycardic, tachypneic, not hypotensive, diffuse crackles on exam, concern for sepsis 2/2 PNA vs other infectious source. Will get CBC, CMP, VBG (for lactate), Blood cultures x2, UA, Urine culture, start 500cc NS IV, ceftriaxone and azithromycin, and reassess.     Dispo: Likely admit

## 2019-09-24 NOTE — H&P ADULT - NSICDXPASTMEDICALHX_GEN_ALL_CORE_FT
PAST MEDICAL HISTORY:  Benign prostatic hyperplasia, unspecified whether lower urinary tract symptoms present     Hypothyroidism, unspecified type     Malignant neoplasm of stomach, unspecified location

## 2019-09-24 NOTE — H&P ADULT - NSHPPHYSICALEXAM_GEN_ALL_CORE
PHYSICAL EXAM:   GENERAL: Alert. +confused. No acute distress. +cachectic. Not obese.  HEAD:  Atraumatic. Normocephalic.  EYES: PERRLA. Normal conjunctiva/sclera.  ENT: Neck supple. No JVD. Moist oral mucosa. +poor dentition. No thrush.  LYMPH: Normal supraclavicular/cervical lymph nodes.   CARDIAC: Not tachy, Not lei. Regular rhythm. Not irregularly irregular. S1. S2. No murmur. No rub. No distant heart sounds.  LUNG/CHEST: BS equal bilaterally. +wheezes. +rales. +rhonchi.  ABDOMEN: Soft. No tenderness. No distension. No fluid wave. Normal bowel sounds.  BACK: No midline/vertebral tenderness. No flank tenderness.  VASCULAR: +2 b/l radial or ulnar pulses. Palpable DP pulses.  EXTREMITIES:  No clubbing. No cyanosis. +1 b/l LE pitting edema. Moving all 4.  NEUROLOGY: A&Ox1. answers 1 word questions only.  Unable to fully assess  PSYCH: Appears to be hallucinating, grabbing things that aren't there, reaching for things. Unable to fully assess  SKIN: No jaundice. No erythema. No rash/lesion.  Vascular Access:     ICU Vital Signs Last 24 Hrs  T(C): 35.8 (24 Sep 2019 22:48), Max: 37 (24 Sep 2019 18:51)  T(F): 96.5 (24 Sep 2019 22:48), Max: 98.6 (24 Sep 2019 18:51)  HR: 88 (24 Sep 2019 22:48) (86 - 138)  BP: 103/87 (24 Sep 2019 22:48) (99/69 - 122/59)  BP(mean): --  ABP: --  ABP(mean): --  RR: 22 (24 Sep 2019 22:48) (20 - 25)  SpO2: 98% (24 Sep 2019 22:48) (96% - 100%)      I&O's Summary

## 2019-09-24 NOTE — H&P ADULT - NSHPREVIEWOFSYSTEMS_GEN_ALL_CORE
REVIEW OF SYSTEMS:  CONSTITUTIONAL: +weakness. No fevers. No chills. +weight loss. No night sweats. +poor appetite.  ENT: +hearing difficulty.  CARDIAC: No chest pain.  RESPIRATORY: +cough. +SOB.   GASTROINTESTINAL: No abdominal pain. No nausea. +vomiting. No hematemesis. No diarrhea. No constipation.   GENITOURINARY: +ayala  NEUROLOGICAL: +unsteady gait.  BACK: No back pain. No flank pain.  EXTREMITIES: +lower extremity edema.    Obtained from pt and family at bedside. Unless indicated above, unable to assess ROS 2/2 encephalopathy

## 2019-09-24 NOTE — ED CLERICAL - NS ED CLERK NOTE PRE-ARRIVAL INFORMATION; ADDITIONAL PRE-ARRIVAL INFORMATION
CC/Reason For referral: Pna on x-ray  Preferred Consultant(if applicable):   Who admits for you (if needed): Emmanuel Pulliam  Do you have documents you would like to fax over?no  Would you still like to speak to an ED attending?no  Pt wants aggressive treatment has DNR at home

## 2019-09-25 DIAGNOSIS — R06.03 ACUTE RESPIRATORY DISTRESS: ICD-10-CM

## 2019-09-25 DIAGNOSIS — C16.0 MALIGNANT NEOPLASM OF CARDIA: ICD-10-CM

## 2019-09-25 DIAGNOSIS — Z71.89 OTHER SPECIFIED COUNSELING: ICD-10-CM

## 2019-09-25 DIAGNOSIS — J18.9 PNEUMONIA, UNSPECIFIED ORGANISM: ICD-10-CM

## 2019-09-25 DIAGNOSIS — I48.91 UNSPECIFIED ATRIAL FIBRILLATION: ICD-10-CM

## 2019-09-25 DIAGNOSIS — R79.89 OTHER SPECIFIED ABNORMAL FINDINGS OF BLOOD CHEMISTRY: ICD-10-CM

## 2019-09-25 DIAGNOSIS — C16.9 MALIGNANT NEOPLASM OF STOMACH, UNSPECIFIED: ICD-10-CM

## 2019-09-25 DIAGNOSIS — N18.4 CHRONIC KIDNEY DISEASE, STAGE 4 (SEVERE): ICD-10-CM

## 2019-09-25 DIAGNOSIS — R53.81 OTHER MALAISE: ICD-10-CM

## 2019-09-25 DIAGNOSIS — J69.0 PNEUMONITIS DUE TO INHALATION OF FOOD AND VOMIT: ICD-10-CM

## 2019-09-25 DIAGNOSIS — R06.02 SHORTNESS OF BREATH: ICD-10-CM

## 2019-09-25 DIAGNOSIS — Z51.5 ENCOUNTER FOR PALLIATIVE CARE: ICD-10-CM

## 2019-09-25 DIAGNOSIS — G93.41 METABOLIC ENCEPHALOPATHY: ICD-10-CM

## 2019-09-25 DIAGNOSIS — T83.511A INFECTION AND INFLAMMATORY REACTION DUE TO INDWELLING URETHRAL CATHETER, INITIAL ENCOUNTER: ICD-10-CM

## 2019-09-25 DIAGNOSIS — G93.40 ENCEPHALOPATHY, UNSPECIFIED: ICD-10-CM

## 2019-09-25 PROBLEM — N40.0 BENIGN PROSTATIC HYPERPLASIA WITHOUT LOWER URINARY TRACT SYMPTOMS: Chronic | Status: ACTIVE | Noted: 2018-12-19

## 2019-09-25 PROBLEM — E03.9 HYPOTHYROIDISM, UNSPECIFIED: Chronic | Status: ACTIVE | Noted: 2018-12-19

## 2019-09-25 LAB
CULTURE RESULTS: NO GROWTH — SIGNIFICANT CHANGE UP
LEGIONELLA AG UR QL: NEGATIVE — SIGNIFICANT CHANGE UP
SPECIMEN SOURCE: SIGNIFICANT CHANGE UP

## 2019-09-25 PROCEDURE — 99233 SBSQ HOSP IP/OBS HIGH 50: CPT | Mod: GC

## 2019-09-25 RX ORDER — HYDROMORPHONE HYDROCHLORIDE 2 MG/ML
0.2 INJECTION INTRAMUSCULAR; INTRAVENOUS; SUBCUTANEOUS
Refills: 0 | Status: DISCONTINUED | OUTPATIENT
Start: 2019-09-25 | End: 2019-09-25

## 2019-09-25 RX ORDER — HYDROMORPHONE HYDROCHLORIDE 2 MG/ML
0.2 INJECTION INTRAMUSCULAR; INTRAVENOUS; SUBCUTANEOUS
Refills: 0 | Status: DISCONTINUED | OUTPATIENT
Start: 2019-09-25 | End: 2019-09-29

## 2019-09-25 RX ORDER — PROCHLORPERAZINE MALEATE 5 MG
10 TABLET ORAL EVERY 12 HOURS
Refills: 0 | Status: DISCONTINUED | OUTPATIENT
Start: 2019-09-25 | End: 2019-09-29

## 2019-09-25 RX ORDER — LIDOCAINE 4 G/100G
5 CREAM TOPICAL EVERY 4 HOURS
Refills: 0 | Status: DISCONTINUED | OUTPATIENT
Start: 2019-09-25 | End: 2019-09-29

## 2019-09-25 RX ORDER — ACETAMINOPHEN 500 MG
650 TABLET ORAL EVERY 6 HOURS
Refills: 0 | Status: DISCONTINUED | OUTPATIENT
Start: 2019-09-25 | End: 2019-10-02

## 2019-09-25 RX ADMIN — Medication 1 MILLIGRAM(S): at 13:07

## 2019-09-25 RX ADMIN — LIDOCAINE 5 MILLILITER(S): 4 CREAM TOPICAL at 20:34

## 2019-09-25 RX ADMIN — Medication 500 MICROGRAM(S): at 01:17

## 2019-09-25 RX ADMIN — FINASTERIDE 5 MILLIGRAM(S): 5 TABLET, FILM COATED ORAL at 13:07

## 2019-09-25 RX ADMIN — Medication 500 MICROGRAM(S): at 05:29

## 2019-09-25 RX ADMIN — Medication 500 MICROGRAM(S): at 13:07

## 2019-09-25 RX ADMIN — HEPARIN SODIUM 5000 UNIT(S): 5000 INJECTION INTRAVENOUS; SUBCUTANEOUS at 18:29

## 2019-09-25 RX ADMIN — Medication 500 MICROGRAM(S): at 18:28

## 2019-09-25 RX ADMIN — PIPERACILLIN AND TAZOBACTAM 25 GRAM(S): 4; .5 INJECTION, POWDER, LYOPHILIZED, FOR SOLUTION INTRAVENOUS at 16:32

## 2019-09-25 RX ADMIN — Medication 75 MICROGRAM(S): at 05:29

## 2019-09-25 RX ADMIN — Medication 30 MILLIGRAM(S): at 16:32

## 2019-09-25 RX ADMIN — HEPARIN SODIUM 5000 UNIT(S): 5000 INJECTION INTRAVENOUS; SUBCUTANEOUS at 05:29

## 2019-09-25 RX ADMIN — PIPERACILLIN AND TAZOBACTAM 200 GRAM(S): 4; .5 INJECTION, POWDER, LYOPHILIZED, FOR SOLUTION INTRAVENOUS at 05:43

## 2019-09-25 RX ADMIN — PANTOPRAZOLE SODIUM 40 MILLIGRAM(S): 20 TABLET, DELAYED RELEASE ORAL at 05:29

## 2019-09-25 NOTE — PROGRESS NOTE ADULT - PROBLEM SELECTOR PLAN 2
Most likely 2/2 aspiration pneumonia 2/2 degree of dysphagia, c/b b/l pleural effusions. CXR revealed bilateral pleural effusions. Underlying pneumonia cannot be ruled out. Leukocytosis on presentation. Patient remains afebrile VS WNL, borderline hypotension.     -c/w Zosyn 3.375 mg IV q8hr (Day 1 of 7)  -c/w Atrovent PRN  -c/w supplemental O2 via 2 L NC

## 2019-09-25 NOTE — PROGRESS NOTE ADULT - SUBJECTIVE AND OBJECTIVE BOX
GAP TEAM PALLIATIVE CARE UNIT PROGRESS NOTE:      [x] Patient on hospice program.    INDICATION FOR PALLIATIVE CARE UNIT SERVICES: Patient with advanced gastric ca, symptom management; dyspnea, encephalopathy, need for IV antibiotics; UTI and possible aspiration pneumonia    INTERVAL HPI/OVERNIGHT EVENTS: Patient was brought to PCU overnight from ED. No acute events noted overnight.   Upon admission work up revealed possible pneumonia, UTI (chronic ayala), hypotension and new onset a-fib w/ RVR. Decision was made to forego telemetry monitoring for a-fib RVR. While in ED, proceeded with treating possible aspiration pneumonia and UTI; received Zithromax 500 mg x1, Rocephin 1 gm IV x1, and was started on Zosyn 3.375 mg IV q8hr. Patient's vital signs and mental status have improved since admission.     Upon examination patient is awake, alert, communicating in both Kosovan and English intermittently. Son is present at bedside. Patient reports minimal sob on 2 L NC, occasional productive cough, and difficulty swallowing solids. Patient denies cp, palpitations, abdominal pain, n/v, fever or chills.    DNR on chart: Yes    Allergies    No Known Allergies    Intolerances    MEDICATIONS  (STANDING):  diltiazem    Tablet 30 milliGRAM(s) Oral every 8 hours  finasteride 5 milliGRAM(s) Oral daily  folic acid 1 milliGRAM(s) Oral daily  heparin  Injectable 5000 Unit(s) SubCutaneous two times a day  ipratropium    for Nebulization 500 MICROGram(s) Nebulizer every 6 hours  levothyroxine 75 MICROGram(s) Oral daily  pantoprazole    Tablet 40 milliGRAM(s) Oral two times a day  piperacillin/tazobactam IVPB.. 3.375 Gram(s) IV Intermittent every 8 hours    MEDICATIONS  (PRN):  acetaminophen  Suppository .. 650 milliGRAM(s) Rectal every 6 hours PRN Temp greater or equal to 38C (100.4F)  guaiFENesin    Syrup 200 milliGRAM(s) Oral every 6 hours PRN Cough  HYDROmorphone  Injectable 0.2 milliGRAM(s) IV Push every 1 hour PRN dyspnea  HYDROmorphone  Injectable 0.2 milliGRAM(s) IV Push every 1 hour PRN Moderate Pain (4 - 6)  LORazepam   Injectable 0.5 milliGRAM(s) IV Push every 2 hours PRN Agitation  prochlorperazine   Tablet 10 milliGRAM(s) Oral every 12 hours PRN reflux    ITEMS UNCHECKED ARE NOT PRESENT    PRESENT SYMPTOMS: [ ]Unable to obtain due to poor mentation   Source if other than patient:  [ ]Family   [ ]Team     Pain: [ ] yes [x] no  QOL impact -   Location -                    Aggravating factors -  Quality -  Radiation -  Timing-  Severity (0-10 scale):  Minimal acceptable level (0-10 scale):     Dyspnea:                           [x]Mild [ ]Moderate [ ]Severe  Anxiety:                             [ ]Mild [ ]Moderate [ ]Severe  Fatigue:                             [x]Mild [ ]Moderate [ ]Severe  Nausea:                             [ ]Mild [ ]Moderate [ ]Severe  Loss of appetite:              [ ]Mild [ ]Moderate [ ]Severe  Constipation:                    [ ]Mild [ ]Moderate [ ]Severe    PAINAD Score:0    [email or web address] (Ctrl +  left click to view)  		  Other Symptoms:  [ ]All other review of systems negative     Karnofsky Performance Score/Palliative Performance Status Version 2: 30-40%        http://npcrc.org/files/news/palliative_performance_scale_ppsv2.pdf  PHYSICAL EXAM:  Vital Signs Last 24 Hrs  T(C): 36.2 (25 Sep 2019 09:23), Max: 37 (24 Sep 2019 18:51)  T(F): 97.1 (25 Sep 2019 09:23), Max: 98.6 (24 Sep 2019 18:51)  HR: 77 (25 Sep 2019 09:23) (77 - 138)  BP: 106/70 (25 Sep 2019 09:23) (99/69 - 122/59)  BP(mean): --  RR: 20 (25 Sep 2019 09:23) (20 - 25)  SpO2: 95% (25 Sep 2019 09:23) (95% - 100%) I&O's Summary    24 Sep 2019 07:01  -  25 Sep 2019 07:00  --------------------------------------------------------  IN: 0 mL / OUT: 125 mL / NET: -125 mL    GENERAL:  [x]Alert  [x]Oriented x2   [ ]Lethargic  [ ]Cachexia  [ ]Unarousable  [x]Verbal  [ ]Non-Verbal  Behavioral: calm & cooperative  [ ] Anxiety  [ ] Delirium [ ] Agitation [ ] Other  HEENT: Patient is hard of hearing  [ ]Normal   [x]Dry mouth   [ ]ET Tube/Trach  [ ]Oral lesions  PULMONARY: audible cough  [ ]Clear [x]Tachypnea  [ ]Audible excessive secretions   [x]Rhonchi        []Right [ ]Left [x]Bilateral  [ ]Crackles        [ ]Right [ ]Left [ ]Bilateral  [ ]Wheezing     [ ]Right [ ]Left [ ]Bilateral  CARDIOVASCULAR:    [x]Regular, unlikely in a-fib [ ]Irregular [ ]Tachy  [ ]Sukhjinder [ ]Murmur [ ]Other  GASTROINTESTINAL:  [x]Soft  [ ]Distended   [x]+BS  [ ]Non tender [ ]Tender  [ ]PEG [ ]OGT/ NGT   Last BM:     GENITOURINARY:  [ ]Normal [ ] Incontinent   [ ]Oliguria/Anuria   [x]Ayala; chronic ayala (exchanged on admission?)  MUSCULOSKELETAL:   [ ]Normal   [x]Weakness  [ ]Bed/Wheelchair bound [x]Edema  NEUROLOGIC:   [ ]No focal deficits  [ ] Cognitive impairment  [x] Dysphagia [ ]Dysarthria [ ] Paresis [ ]Other   SKIN:   [ ]Normal   [ ]Pressure ulcer(s)  [ ]Rash [x] please refer RN documentation for further details    CRITICAL CARE:  [ ] Shock Present  [ ]Septic [ ]Cardiogenic [ ]Neurologic [ ]Hypovolemic  [ ]  Vasopressors [ ]  Inotropes   [ ] Respiratory failure present [ ] Mechanical Ventilation [ ] Non-invasive ventilatory support [ ] High-Flow  [ ] Acute  [ ] Chronic [ ] Hypoxic  [ ] Hypercarbic [ ] Other  [ ] Other organ failure;      LABS:                        11.8   11.7  )-----------( 325      ( 24 Sep 2019 18:54 )             38.4   09-    138  |  104  |  30<H>  ----------------------------<  132<H>  4.7   |  23  |  1.77<H>    Ca    8.9      24 Sep 2019 18:54    TPro  4.9<L>  /  Alb  2.2<L>  /  TBili  0.2  /  DBili  x   /  AST  11  /  ALT  9<L>  /  AlkPhos  130<H>  09-24  PT/INR - ( 24 Sep 2019 18:54 )   PT: 11.8 sec;   INR: 1.03 ratio         PTT - ( 24 Sep 2019 18:54 )  PTT:28.6 sec    Urinalysis Basic - ( 24 Sep 2019 19:17 )    Color: Light Yellow / Appearance: Turbid / S.018 / pH: x  Gluc: x / Ketone: Negative  / Bili: Negative / Urobili: Negative   Blood: x / Protein: 100 mg/dL / Nitrite: Positive   Leuk Esterase: Large / RBC: 20 /hpf / WBC >50   Sq Epi: x / Non Sq Epi: 0 / Bacteria: Many      RADIOLOGY & ADDITIONAL STUDIES: < from: Xray Chest 1 View AP/PA (19 @ 19:12) >  FINDINGS:   Patient is rotated.  Hazy opacification of the bilateral lower lungs, left greater than right,   likely representing layering pleural effusions.  Heart size cannot be directly assessed on this projection.  No acute osseous abnormalities.    IMPRESSION:  Bilateral pleural effusions. Underlying pneumonia cannot be ruled out.    < end of copied text >      PROTEIN CALORIE MALNUTRITION:   [x] PPSV2 < or = 30% [ ] significant weight loss [x] poor nutritional intake [x] anasarca [x] catabolic state   Albumin, Serum: 2.2 g/dL (19 @ 18:54)   Artificial Nutrition [ ]     REFERRALS:   [ ]Chaplaincy  [ ] Hospice  [ ]Child Life  [ ]Social Work  [x]Case management [ ]Holistic Therapy [ ] Physical Therapy [ ] Dietary

## 2019-09-25 NOTE — PROGRESS NOTE ADULT - PROBLEM SELECTOR PLAN 3
Most likely due to metabolic encephalopathy 2/2 infection (UTI and aspiration pneumonia). Mental status has improved since admission and with the administration of IV antibiotics and IVF.

## 2019-09-25 NOTE — PROGRESS NOTE ADULT - SUBJECTIVE AND OBJECTIVE BOX
85 y/o M, Vietnamese speaking, c hx advanced gastric cancer not on chemo, on home hospice  ?CKD, gastric ulcer, pericardial effusion s/p tap (Dec '18), obstructive uropathy s/p chronic ayala (Dec '18), dysphagia on pureed with thin liquids, hypothyroidism, anemia receiving IV iron and IV albumin infusion by heme, presents from home with likely PNA   . Pt has not required any hospitalizations since last Dec '18, and was in usual state of health until 2 days ago, when he started to cough   . For the past couple of day s pt has been confused,    CXR was performed at home, which showed possible pna,       INTERVAL HPI/OVERNIGHT EVENTS:    Medications:MEDICATIONS  (STANDING):  diltiazem    Tablet 30 milliGRAM(s) Oral every 8 hours  finasteride 5 milliGRAM(s) Oral daily  folic acid 1 milliGRAM(s) Oral daily  heparin  Injectable 5000 Unit(s) SubCutaneous two times a day  ipratropium    for Nebulization 500 MICROGram(s) Nebulizer every 6 hours  levothyroxine 75 MICROGram(s) Oral daily  pantoprazole    Tablet 40 milliGRAM(s) Oral two times a day  piperacillin/tazobactam IVPB.. 3.375 Gram(s) IV Intermittent every 8 hours    MEDICATIONS  (PRN):  acetaminophen  Suppository .. 650 milliGRAM(s) Rectal every 6 hours PRN Temp greater or equal to 38C (100.4F)  HYDROmorphone  Injectable 0.2 milliGRAM(s) IV Push every 1 hour PRN dyspnea  HYDROmorphone  Injectable 0.2 milliGRAM(s) IV Push every 1 hour PRN pain      Allergies: Allergies    No Known Allergies    Intolerances          FAMILY HISTORY:  Family history of Hodgkin's lymphoma        PAST MEDICAL & SURGICAL HISTORY:  Benign prostatic hyperplasia, unspecified whether lower urinary tract symptoms present  Hypothyroidism, unspecified type  Malignant neoplasm of stomach, unspecified location  No significant past surgical history      REVIEW OF SYSTEMS:  as above  EYES: No eye pain, visual disturbances, or discharge  ENMT:  No difficulty hearing, tinnitus, vertigo;  NECK: No pain or stiffness    RESPIRATORY: some resp insufficiency  CARDIOVASCULAR: No chest pain, palpitations, dizziness, or leg swelling  GASTROINTESTINAL: No abdominal or epigastric pain.   GENITOURINARY: No dysuria, frequency, hematuria, or incontinence  NEUROLOGICAL: No headaches,       T(C): 36.2 (19 @ 09:23), Max: 37 (19 @ 18:51)  HR: 77 (19 @ 09:23) (77 - 138)  BP: 106/70 (19 @ 09:23) (99/69 - 122/59)  RR: 20 (19 @ 09:23) (20 - 25)  SpO2: 95% (19 @ 09:23) (95% - 100%)  Wt(kg): --Vital Signs Last 24 Hrs  T(C): 36.2 (25 Sep 2019 09:23), Max: 37 (24 Sep 2019 18:51)  T(F): 97.1 (25 Sep 2019 09:23), Max: 98.6 (24 Sep 2019 18:51)  HR: 77 (25 Sep 2019 09:23) (77 - 138)  BP: 106/70 (25 Sep 2019 09:23) (99/69 - 122/59)  BP(mean): --  RR: 20 (25 Sep 2019 09:23) (20 - 25)  SpO2: 95% (25 Sep 2019 09:23) (95% - 100%)  I&O's Summary    24 Sep 2019 07:01  -  25 Sep 2019 07:00  --------------------------------------------------------  IN: 0 mL / OUT: 125 mL / NET: -125 mL        PHYSICAL EXAM:  GENERAL: NAD,   HEAD:  Atraumatic, Normocephalic  EYES: EOMI, PERRLA, conjunctiva and sclera clear  ENMT: No tonsillar erythema, exudates, or enlargement; , No lesions  NECK: Supple, No JVD, Normal thyroid  NERVOUS SYSTEM:  Alert & Oriented X3,   Motor Strength 5/5 B/L upper and lower extremities; DTRs 2+ intact and symmetric  CHEST/LUNG: Cdec bs   HEART: Regular irreg  ABDOMEN: Soft, Nontender, Nondistended; Bowel sounds present  EXTREMITIES:  2+ Peripheral Pulses, No clubbing, cyanosis, or edema      Consultant(s) Notes Reviewed:  [x ] YES  [ ] NO  Care Discussed with Consultants/Other Providerscpk [ x] YES  [ ] NO    LABS:                    CBC Full  -  ( 24 Sep 2019 18:54 )  WBC Count : 11.7 K/uL  RBC Count : 4.44 M/uL  Hemoglobin : 11.8 g/dL  Hematocrit : 38.4 %  Platelet Count - Automated : 325 K/uL  Mean Cell Volume : 86.5 fl  Mean Cell Hemoglobin : 26.5 pg  Mean Cell Hemoglobin Concentration : 30.6 gm/dL  Auto Neutrophil # : 9.9 K/uL  Auto Lymphocyte # : 1.0 K/uL  Auto Monocyte # : 0.8 K/uL  Auto Eosinophil # : 0.0 K/uL  Auto Basophil # : 0.0 K/uL  Auto Neutrophil % : 84.5 %  Auto Lymphocyte % : 8.6 %  Auto Monocyte % : 6.5 %  Auto Eosinophil % : 0.4 %  Auto Basophil % : 0.0 %          138  |  104  |  30<H>  ----------------------------<  132<H>  4.7   |  23  |  1.77<H>    Ca    8.9      24 Sep 2019 18:54    TPro  4.9<L>  /  Alb  2.2<L>  /  TBili  0.2  /  DBili  x   /  AST  11  /  ALT  9<L>  /  AlkPhos  130<H>        Urinalysis Basic - ( 24 Sep 2019 19:17 )    Color: Light Yellow / Appearance: Turbid / S.018 / pH: x  Gluc: x / Ketone: Negative  / Bili: Negative / Urobili: Negative   Blood: x / Protein: 100 mg/dL / Nitrite: Positive   Leuk Esterase: Large / RBC: 20 /hpf / WBC >50   Sq Epi: x / Non Sq Epi: 0 / Bacteria: Many        PT/INR - ( 24 Sep 2019 18:54 )   PT: 11.8 sec;   INR: 1.03 ratio         PTT - ( 24 Sep 2019 18:54 )  PTT:28.6 sec  RADIOLOGY & ADDITIONAL TESTS:    Imaging Personally Reviewed:  [ ] YES  [ ] NO

## 2019-09-25 NOTE — PROGRESS NOTE ADULT - PROBLEM SELECTOR PLAN 4
Most likely 2/2 infection and component of hypoalbuminemia. Will treat associated symptoms of dyspnea and cough.

## 2019-09-25 NOTE — PROGRESS NOTE ADULT - PROBLEM SELECTOR PLAN 8
Discussed diagnoses, disease trajectory, and treatment options discussed with patient and Jameson Collier (HCP/Son). Explained that aspiration pneumonia may be due to aspiration due to component of dysphagia and possible encephalopathy due to underlying UTI infection. Discussed that further imaging (CT chest) and IVF hydration are not necessary at present time due to improvement initiation of antibiotics and electrolytes that are WNL. Son conveys understanding an agrees with treating underlying infection(s) and symptoms. Patient has MOLST form in chart (DNR/DNI, no feeding tubes, and comfort care).

## 2019-09-26 DIAGNOSIS — R00.0 TACHYCARDIA, UNSPECIFIED: ICD-10-CM

## 2019-09-26 LAB
ANION GAP SERPL CALC-SCNC: 11 MMOL/L — SIGNIFICANT CHANGE UP (ref 5–17)
BUN SERPL-MCNC: 28 MG/DL — HIGH (ref 7–23)
CALCIUM SERPL-MCNC: 8.9 MG/DL — SIGNIFICANT CHANGE UP (ref 8.4–10.5)
CHLORIDE SERPL-SCNC: 106 MMOL/L — SIGNIFICANT CHANGE UP (ref 96–108)
CO2 SERPL-SCNC: 20 MMOL/L — LOW (ref 22–31)
CREAT SERPL-MCNC: 1.83 MG/DL — HIGH (ref 0.5–1.3)
GLUCOSE SERPL-MCNC: 116 MG/DL — HIGH (ref 70–99)
HCT VFR BLD CALC: 38.2 % — LOW (ref 39–50)
HGB BLD-MCNC: 11.6 G/DL — LOW (ref 13–17)
MCHC RBC-ENTMCNC: 25.6 PG — LOW (ref 27–34)
MCHC RBC-ENTMCNC: 30.4 GM/DL — LOW (ref 32–36)
MCV RBC AUTO: 84.3 FL — SIGNIFICANT CHANGE UP (ref 80–100)
PLATELET # BLD AUTO: 320 K/UL — SIGNIFICANT CHANGE UP (ref 150–400)
POTASSIUM SERPL-MCNC: 4.3 MMOL/L — SIGNIFICANT CHANGE UP (ref 3.5–5.3)
POTASSIUM SERPL-SCNC: 4.3 MMOL/L — SIGNIFICANT CHANGE UP (ref 3.5–5.3)
RBC # BLD: 4.53 M/UL — SIGNIFICANT CHANGE UP (ref 4.2–5.8)
RBC # FLD: 16.6 % — HIGH (ref 10.3–14.5)
SODIUM SERPL-SCNC: 137 MMOL/L — SIGNIFICANT CHANGE UP (ref 135–145)
WBC # BLD: 12.03 K/UL — HIGH (ref 3.8–10.5)
WBC # FLD AUTO: 12.03 K/UL — HIGH (ref 3.8–10.5)

## 2019-09-26 PROCEDURE — 93010 ELECTROCARDIOGRAM REPORT: CPT

## 2019-09-26 PROCEDURE — 99233 SBSQ HOSP IP/OBS HIGH 50: CPT | Mod: GC

## 2019-09-26 RX ORDER — SODIUM CHLORIDE 9 MG/ML
500 INJECTION INTRAMUSCULAR; INTRAVENOUS; SUBCUTANEOUS ONCE
Refills: 0 | Status: COMPLETED | OUTPATIENT
Start: 2019-09-26 | End: 2019-09-26

## 2019-09-26 RX ORDER — IPRATROPIUM/ALBUTEROL SULFATE 18-103MCG
3 AEROSOL WITH ADAPTER (GRAM) INHALATION EVERY 6 HOURS
Refills: 0 | Status: DISCONTINUED | OUTPATIENT
Start: 2019-09-26 | End: 2019-09-29

## 2019-09-26 RX ADMIN — Medication 30 MILLIGRAM(S): at 21:42

## 2019-09-26 RX ADMIN — Medication 3 MILLILITER(S): at 18:03

## 2019-09-26 RX ADMIN — Medication 500 MICROGRAM(S): at 00:02

## 2019-09-26 RX ADMIN — Medication 500 MICROGRAM(S): at 05:22

## 2019-09-26 RX ADMIN — Medication 0.5 MILLIGRAM(S): at 23:17

## 2019-09-26 RX ADMIN — Medication 30 MILLIGRAM(S): at 00:02

## 2019-09-26 RX ADMIN — Medication 1 MILLIGRAM(S): at 13:34

## 2019-09-26 RX ADMIN — PIPERACILLIN AND TAZOBACTAM 25 GRAM(S): 4; .5 INJECTION, POWDER, LYOPHILIZED, FOR SOLUTION INTRAVENOUS at 00:02

## 2019-09-26 RX ADMIN — HEPARIN SODIUM 5000 UNIT(S): 5000 INJECTION INTRAVENOUS; SUBCUTANEOUS at 05:22

## 2019-09-26 RX ADMIN — Medication 3 MILLILITER(S): at 13:34

## 2019-09-26 RX ADMIN — Medication 200 MILLIGRAM(S): at 10:30

## 2019-09-26 RX ADMIN — Medication 30 MILLIGRAM(S): at 08:42

## 2019-09-26 RX ADMIN — HEPARIN SODIUM 5000 UNIT(S): 5000 INJECTION INTRAVENOUS; SUBCUTANEOUS at 18:02

## 2019-09-26 RX ADMIN — Medication 3 MILLILITER(S): at 23:20

## 2019-09-26 RX ADMIN — SODIUM CHLORIDE 500 MILLILITER(S): 9 INJECTION INTRAMUSCULAR; INTRAVENOUS; SUBCUTANEOUS at 13:06

## 2019-09-26 RX ADMIN — PIPERACILLIN AND TAZOBACTAM 25 GRAM(S): 4; .5 INJECTION, POWDER, LYOPHILIZED, FOR SOLUTION INTRAVENOUS at 18:03

## 2019-09-26 RX ADMIN — Medication 0.5 MILLIGRAM(S): at 13:06

## 2019-09-26 RX ADMIN — PIPERACILLIN AND TAZOBACTAM 25 GRAM(S): 4; .5 INJECTION, POWDER, LYOPHILIZED, FOR SOLUTION INTRAVENOUS at 08:42

## 2019-09-26 RX ADMIN — Medication 75 MICROGRAM(S): at 05:22

## 2019-09-26 NOTE — PROGRESS NOTE ADULT - PROBLEM SELECTOR PLAN 4
Most likely due to metabolic encephalopathy 2/2 infection (UTI and aspiration pneumonia). Mental status has improved since admission and with the administration of IV antibiotics and IVF. Most likely due to metabolic encephalopathy 2/2 infection (UTI and aspiration pneumonia). Mental status has improved since admission and with the administration of IV antibiotics and IVF. However patient has episodes of confusion. Most likely due to metabolic encephalopathy 2/2 infection (UTI and aspiration pneumonia). Mental status has improved since admission and with the administration of IV antibiotics and IVF. However patient has intermittent episodes of confusion.

## 2019-09-26 NOTE — PROGRESS NOTE ADULT - PROBLEM SELECTOR PLAN 2
Patient noted with new onset a-fib w/ RVR. CHADVASc: 2 (for age > 75). -110s today. Received Cardizem IVP x1.    -Will repeat EKG  -C/w rate control   -Plan to discuss with family possible consequences of irregular heart rhythm and risk/benefit profile of possible anticoagulation in the setting of advanced gastric ca and STELLA on CKD. Patient noted with new onset a-fib w/ RVR. CHADVASc: 2 (for age > 75). -110s today. Received Cardizem IVP x1.    EKG w/ SVT @ 154 bpm (QTc 448).    -C/w Cardizem 30 mg po q8hr ATC    -Plan to discuss with family possible consequences of irregular heart rhythm and risk/benefit profile of possible anticoagulation in the setting of advanced gastric ca and STELLA on CKD. Patient noted with new onset a-fib w/ RVR. CHADVASc: 2 (for age > 75). -110s today. Received Cardizem IVP x1. will discuss with family about appropriateness of AC. Check EKG today    EKG w/ SVT @ 154 bpm (QTc 448).    -C/w Cardizem 30 mg po q8hr ATC    -Plan to discuss with family possible consequences of irregular heart rhythm and risk/benefit profile of possible anticoagulation in the setting of advanced gastric ca and STELLA on CKD. Patient noted with new onset a-fib w/ RVR. CHADVASc: 2 (for age > 75). -110s today. Received Cardizem IVP x1. will discuss with family about appropriateness of AC.     EKG w/ SVT @ 154 bpm (QTc 448).    -C/w Cardizem 30 mg po q8hr ATC  - cc IV bolus x1    -Plan to discuss with family possible consequences of irregular heart rhythm and risk/benefit profile of possible anticoagulation in the setting of advanced gastric ca and STELLA on CKD.

## 2019-09-26 NOTE — PHYSICAL THERAPY INITIAL EVALUATION ADULT - STRENGTHENING, PT EVAL
GOAL: Pt will improve bilateral LE strength to 3+/5, for increased limb stability, to improve gait in 4 weeks.

## 2019-09-26 NOTE — PROGRESS NOTE ADULT - PROBLEM SELECTOR PLAN 8
Current Cr. (1.77). CrCl <30. Has h/o obstructive uropathy requiring chronic ayala. Worsening renal function. (Cr 1.8, CrCl <30) Has h/o obstructive uropathy requiring chronic ayala.

## 2019-09-26 NOTE — PHYSICAL THERAPY INITIAL EVALUATION ADULT - ADDITIONAL COMMENTS
Pt lives in a private house with a few steps to enter and one flight of steps inside. Pt's bedroom was been moved to the main level of the home. Pt owns RW, commode, hospital bed, w/c. Pt requires assist for all functional mobility and is able to amb short distances with RW.

## 2019-09-26 NOTE — PROGRESS NOTE ADULT - SUBJECTIVE AND OBJECTIVE BOX
GAP TEAM PALLIATIVE CARE UNIT PROGRESS NOTE:      [x] Patient on hospice program.    INDICATION FOR PALLIATIVE CARE UNIT SERVICES: Patient with advanced gastric ca, symptom management; dyspnea, encephalopathy, need for IV antibiotics; UTI and possible aspiration pneumonia    INTERVAL HPI/OVERNIGHT EVENTS: No acute events overnight. Patient was seen and examined at bedside. According to RN report, family was noted to be feeding patient fruits overnight, concern for further aspiration. Patient is AAOx2, appears in NAD, conversing in both Turkish and English. Patient reports minimal sob on 2 L NC, occasional productive cough, and continued difficulty swallowing solids. Patient denies cp, palpitations, abdominal pain, n/v, fever or chills.     DNR on chart: Yes    Allergies    No Known Allergies    Intolerances    MEDICATIONS  (STANDING):  diltiazem    Tablet 30 milliGRAM(s) Oral every 8 hours  finasteride 5 milliGRAM(s) Oral daily  folic acid 1 milliGRAM(s) Oral daily  heparin  Injectable 5000 Unit(s) SubCutaneous two times a day  ipratropium    for Nebulization 500 MICROGram(s) Nebulizer every 6 hours  levothyroxine 75 MICROGram(s) Oral daily  pantoprazole    Tablet 40 milliGRAM(s) Oral two times a day  piperacillin/tazobactam IVPB.. 3.375 Gram(s) IV Intermittent every 8 hours    MEDICATIONS  (PRN):  acetaminophen  Suppository .. 650 milliGRAM(s) Rectal every 6 hours PRN Temp greater or equal to 38C (100.4F)  guaiFENesin    Syrup 200 milliGRAM(s) Oral every 6 hours PRN Cough  HYDROmorphone  Injectable 0.2 milliGRAM(s) IV Push every 1 hour PRN dyspnea  HYDROmorphone  Injectable 0.2 milliGRAM(s) IV Push every 1 hour PRN Moderate Pain (4 - 6)  lidocaine 2% Viscous 5 milliLiter(s) Oral every 4 hours PRN sore throat  LORazepam   Injectable 0.5 milliGRAM(s) IV Push every 2 hours PRN Agitation  prochlorperazine   Tablet 10 milliGRAM(s) Oral every 12 hours PRN reflux      ITEMS UNCHECKED ARE NOT PRESENT    PRESENT SYMPTOMS: [ ]Unable to obtain due to poor mentation   Source if other than patient:  [ ]Family   [ ]Team     Pain: [ ] yes [x] no  QOL impact -   Location -                    Aggravating factors -  Quality -  Radiation -  Timing-  Severity (0-10 scale):  Minimal acceptable level (0-10 scale):     Dyspnea:                           [x]Mild [ ]Moderate [ ]Severe  Anxiety:                             [ ]Mild [ ]Moderate [ ]Severe  Fatigue:                             [x]Mild [ ]Moderate [ ]Severe  Nausea:                             [ ]Mild [ ]Moderate [ ]Severe  Loss of appetite:              [ ]Mild [ ]Moderate [ ]Severe  Constipation:                    [ ]Mild [ ]Moderate [ ]Severe    PAINAD Score:0    [email or web address] (Ctrl +  left click to view)  		  Other Symptoms:  [ ]All other review of systems negative     Karnofsky Performance Score/Palliative Performance Status Version 2: 30-40%        http://npcrc.org/files/news/palliative_performance_scale_ppsv2.pdf  PHYSICAL EXAM:  Vital Signs Last 24 Hrs  T(C): 36.4 (26 Sep 2019 09:03), Max: 36.4 (26 Sep 2019 09:03)  T(F): 97.5 (26 Sep 2019 09:03), Max: 97.5 (26 Sep 2019 09:03)  HR: 111 (26 Sep 2019 09:03) (77 - 111)  BP: 108/62 (26 Sep 2019 09:03) (105/- - 108/66)  BP(mean): 53 (25 Sep 2019 23:59) (53 - 53)  RR: 20 (26 Sep 2019 09:03) (20 - 20)  SpO2: 98% (26 Sep 2019 09:03) (95% - 98%)  --------------------------------------------------------  IN: 0 mL / OUT: 125 mL / NET: -125 mL    GENERAL:   [x]Alert  [x]Oriented x2   [ ]Lethargic  [ ]Cachexia  [ ]Unarousable  [x]Verbal  [ ]Non-Verbal  Behavioral: calm & cooperative  [ ] Anxiety  [ ] Delirium [ ] Agitation [ ] Other  HEENT: Patient is hard of hearing  [ ]Normal   [x]Dry mouth   [ ]ET Tube/Trach  [ ]Oral lesions  PULMONARY: audible cough  [ ]Clear [x]Tachypnea  [ ]Audible excessive secretions   [x]Rhonchi        []Right [ ]Left [x]Bilateral  [ ]Crackles        [ ]Right [ ]Left [ ]Bilateral  [ ]Wheezing     [ ]Right [ ]Left [ ]Bilateral  CARDIOVASCULAR:    [ ]Regular [x]Irregular [x]Tachy  [ ]Sukhjinder [ ]Murmur [ ]Other  GASTROINTESTINAL:  [x]Soft  [ ]Distended   [x]+BS  [ ]Non tender [ ]Tender  [ ]PEG [ ]OGT/ NGT   Last BM:     GENITOURINARY:  [ ]Normal [ ] Incontinent   [ ]Oliguria/Anuria   [x]Ayala; chronic ayala (exchanged on admission)  MUSCULOSKELETAL:   [ ]Normal   [x]Weakness  [ ]Bed/Wheelchair bound [x]Edema +2  NEUROLOGIC:   [ ]No focal deficits  [ ] Cognitive impairment  [x] Dysphagia [ ]Dysarthria [ ] Paresis [ ]Other   SKIN:   [ ]Normal   [ ]Pressure ulcer(s)  [ ]Rash [x] please refer RN documentation for further details    CRITICAL CARE:  [ ] Shock Present  [ ]Septic [ ]Cardiogenic [ ]Neurologic [ ]Hypovolemic  [ ]  Vasopressors [ ]  Inotropes   [ ] Respiratory failure present [ ] Mechanical Ventilation [ ] Non-invasive ventilatory support [ ] High-Flow  [ ] Acute  [ ] Chronic [ ] Hypoxic  [ ] Hypercarbic [ ] Other  [ ] Other organ failure;      LABS: Repeat labs pending.                        11.8   11.7  )-----------( 325      ( 24 Sep 2019 18:54 )             38.4       138  |  104  |  30<H>  ----------------------------<  132<H>  4.7   |  23  |  1.77<H>    Ca    8.9      24 Sep 2019 18:54    TPro  4.9<L>  /  Alb  2.2<L>  /  TBili  0.2  /  DBili  x   /  AST  11  /  ALT  9<L>  /  AlkPhos  130<H>    PT/INR - ( 24 Sep 2019 18:54 )   PT: 11.8 sec;   INR: 1.03 ratio         PTT - ( 24 Sep 2019 18:54 )  PTT:28.6 sec    Urinalysis Basic - ( 24 Sep 2019 19:17 )    Color: Light Yellow / Appearance: Turbid / S.018 / pH: x  Gluc: x / Ketone: Negative  / Bili: Negative / Urobili: Negative   Blood: x / Protein: 100 mg/dL / Nitrite: Positive   Leuk Esterase: Large / RBC: 20 /hpf / WBC >50   Sq Epi: x / Non Sq Epi: 0 / Bacteria: Many      RADIOLOGY & ADDITIONAL STUDIES: < from: Xray Chest 1 View AP/PA (19 @ 19:12) >  FINDINGS:   Patient is rotated.  Hazy opacification of the bilateral lower lungs, left greater than right,   likely representing layering pleural effusions.  Heart size cannot be directly assessed on this projection.  No acute osseous abnormalities.    IMPRESSION:  Bilateral pleural effusions. Underlying pneumonia cannot be ruled out.    < end of copied text >      PROTEIN CALORIE MALNUTRITION:   [x] PPSV2 < or = 30% [ ] significant weight loss [x] poor nutritional intake [x] anasarca [x] catabolic state   Albumin, Serum: 2.2 g/dL (19 @ 18:54)   Artificial Nutrition [ ]     REFERRALS:   [ ]Chaplaincy  [ ] Hospice  [ ]Child Life  [ ]Social Work  [x]Case management [ ]Holistic Therapy [ ] Physical Therapy [ ] Dietary GAP TEAM PALLIATIVE CARE UNIT PROGRESS NOTE:      [x] Patient on hospice program.    INDICATION FOR PALLIATIVE CARE UNIT SERVICES: Patient with advanced gastric ca, symptom management; dyspnea, encephalopathy, need for IV antibiotics; UTI and possible aspiration pneumonia    INTERVAL HPI/OVERNIGHT EVENTS: No acute events overnight. Patient was seen and examined at bedside. According to RN report, family was noted to be feeding patient fruits overnight, concern for further aspiration. Patient is AAOx2, appears in NAD, conversing in both Occitan and English. Patient reports minimal sob on 2 L NC, occasional productive cough, and continued difficulty swallowing solids. Patient denies cp, palpitations, abdominal pain, n/v, fever or chills.     DNR on chart: Yes    Allergies    No Known Allergies    Intolerances    MEDICATIONS  (STANDING):  diltiazem    Tablet 30 milliGRAM(s) Oral every 8 hours  finasteride 5 milliGRAM(s) Oral daily  folic acid 1 milliGRAM(s) Oral daily  heparin  Injectable 5000 Unit(s) SubCutaneous two times a day  ipratropium    for Nebulization 500 MICROGram(s) Nebulizer every 6 hours  levothyroxine 75 MICROGram(s) Oral daily  pantoprazole    Tablet 40 milliGRAM(s) Oral two times a day  piperacillin/tazobactam IVPB.. 3.375 Gram(s) IV Intermittent every 8 hours    MEDICATIONS  (PRN):  acetaminophen  Suppository .. 650 milliGRAM(s) Rectal every 6 hours PRN Temp greater or equal to 38C (100.4F)  guaiFENesin    Syrup 200 milliGRAM(s) Oral every 6 hours PRN Cough  HYDROmorphone  Injectable 0.2 milliGRAM(s) IV Push every 1 hour PRN dyspnea  HYDROmorphone  Injectable 0.2 milliGRAM(s) IV Push every 1 hour PRN Moderate Pain (4 - 6)  lidocaine 2% Viscous 5 milliLiter(s) Oral every 4 hours PRN sore throat  LORazepam   Injectable 0.5 milliGRAM(s) IV Push every 2 hours PRN Agitation  prochlorperazine   Tablet 10 milliGRAM(s) Oral every 12 hours PRN reflux      ITEMS UNCHECKED ARE NOT PRESENT    PRESENT SYMPTOMS: [ ]Unable to obtain due to poor mentation   Source if other than patient:  [ ]Family   [ ]Team     Pain: [ ] yes [x] no  QOL impact -   Location -                    Aggravating factors -  Quality -  Radiation -  Timing-  Severity (0-10 scale):  Minimal acceptable level (0-10 scale):     Dyspnea:                           [x]Mild [ ]Moderate [ ]Severe  Anxiety:                             [ ]Mild [ ]Moderate [ ]Severe  Fatigue:                             [x]Mild [ ]Moderate [ ]Severe  Nausea:                             [ ]Mild [ ]Moderate [ ]Severe  Loss of appetite:              [ ]Mild [ ]Moderate [ ]Severe  Constipation:                    [ ]Mild [ ]Moderate [ ]Severe    PAINAD Score:0    [email or web address] (Ctrl +  left click to view)  		  Other Symptoms:  [ ]All other review of systems negative     Karnofsky Performance Score/Palliative Performance Status Version 2: 30-40%        http://npcrc.org/files/news/palliative_performance_scale_ppsv2.pdf  PHYSICAL EXAM:  Vital Signs Last 24 Hrs  T(C): 36.4 (26 Sep 2019 09:03), Max: 36.4 (26 Sep 2019 09:03)  T(F): 97.5 (26 Sep 2019 09:03), Max: 97.5 (26 Sep 2019 09:03)  HR: 111 (26 Sep 2019 09:03) (77 - 111)  BP: 108/62 (26 Sep 2019 09:03) (105/- - 108/66)  BP(mean): 53 (25 Sep 2019 23:59) (53 - 53)  RR: 20 (26 Sep 2019 09:03) (20 - 20)  SpO2: 98% (26 Sep 2019 09:03) (95% - 98%)  --------------------------------------------------------  IN: 0 mL / OUT: 125 mL / NET: -125 mL    GENERAL:   [x]Alert  [x]Oriented x2   [ ]Lethargic  [ ]Cachexia  [ ]Unarousable  [x]Verbal  [ ]Non-Verbal  Behavioral: calm & cooperative  [ ] Anxiety  [ ] Delirium [ ] Agitation [ ] Other  HEENT: Patient is hard of hearing  [ ]Normal   [x]Dry mouth   [ ]ET Tube/Trach  [ ]Oral lesions  PULMONARY: audible cough  [ ]Clear [x]Tachypnea  [ ]Audible excessive secretions   [x]Rhonchi        []Right [ ]Left [x]Bilateral  [ ]Crackles        [ ]Right [ ]Left [ ]Bilateral  [ ]Wheezing     [ ]Right [ ]Left [ ]Bilateral  CARDIOVASCULAR:    [ ]Regular [ ]Irregular [x]Tachy  [ ]Sukhjinder [ ]Murmur [ ]Other  GASTROINTESTINAL:  [x]Soft  [ ]Distended   [x]+BS  [ ]Non tender [ ]Tender  [ ]PEG [ ]OGT/ NGT   Last BM:     GENITOURINARY:  [ ]Normal [ ] Incontinent   [ ]Oliguria/Anuria   [x]Ayala; chronic ayala (exchanged on admission)  MUSCULOSKELETAL:   [ ]Normal   [x]Weakness  [ ]Bed/Wheelchair bound [x]Edema +2  NEUROLOGIC:   [ ]No focal deficits  [ ] Cognitive impairment  [x] Dysphagia [ ]Dysarthria [ ] Paresis [ ]Other   SKIN:   [ ]Normal   [ ]Pressure ulcer(s)  [ ]Rash [x] please refer RN documentation for further details    CRITICAL CARE:  [ ] Shock Present  [ ]Septic [ ]Cardiogenic [ ]Neurologic [ ]Hypovolemic  [ ]  Vasopressors [ ]  Inotropes   [ ] Respiratory failure present [ ] Mechanical Ventilation [ ] Non-invasive ventilatory support [ ] High-Flow  [ ] Acute  [ ] Chronic [ ] Hypoxic  [ ] Hypercarbic [ ] Other  [ ] Other organ failure;      LABS: Repeat labs pending.                        11.8   11.7  )-----------( 325      ( 24 Sep 2019 18:54 )             38.4       138  |  104  |  30<H>  ----------------------------<  132<H>  4.7   |  23  |  1.77<H>    Ca    8.9      24 Sep 2019 18:54    TPro  4.9<L>  /  Alb  2.2<L>  /  TBili  0.2  /  DBili  x   /  AST  11  /  ALT  9<L>  /  AlkPhos  130<H>    PT/INR - ( 24 Sep 2019 18:54 )   PT: 11.8 sec;   INR: 1.03 ratio         PTT - ( 24 Sep 2019 18:54 )  PTT:28.6 sec    Urinalysis Basic - ( 24 Sep 2019 19:17 )    Color: Light Yellow / Appearance: Turbid / S.018 / pH: x  Gluc: x / Ketone: Negative  / Bili: Negative / Urobili: Negative   Blood: x / Protein: 100 mg/dL / Nitrite: Positive   Leuk Esterase: Large / RBC: 20 /hpf / WBC >50   Sq Epi: x / Non Sq Epi: 0 / Bacteria: Many      RADIOLOGY & ADDITIONAL STUDIES: < from: Xray Chest 1 View AP/PA (19 @ 19:12) >  FINDINGS:   Patient is rotated.  Hazy opacification of the bilateral lower lungs, left greater than right,   likely representing layering pleural effusions.  Heart size cannot be directly assessed on this projection.  No acute osseous abnormalities.    IMPRESSION:  Bilateral pleural effusions. Underlying pneumonia cannot be ruled out.    < end of copied text >      PROTEIN CALORIE MALNUTRITION:   [x] PPSV2 < or = 30% [ ] significant weight loss [x] poor nutritional intake [x] anasarca [x] catabolic state   Albumin, Serum: 2.2 g/dL (19 @ 18:54)   Artificial Nutrition [ ]     REFERRALS:   [ ]Chaplaincy  [ ] Hospice  [ ]Child Life  [ ]Social Work  [x]Case management [ ]Holistic Therapy [ ] Physical Therapy [ ] Dietary GAP TEAM PALLIATIVE CARE UNIT PROGRESS NOTE:      [x] Patient on hospice program.    INDICATION FOR PALLIATIVE CARE UNIT SERVICES: Patient with advanced gastric ca, symptom management; dyspnea, encephalopathy, need for IV antibiotics; UTI and possible aspiration pneumonia    INTERVAL HPI/OVERNIGHT EVENTS: No acute events overnight. Patient was seen and examined at bedside. According to RN report, family was noted to be feeding patient fruits overnight, concern for further aspiration. Patient is AAOx2, appears in NAD, conversing in both Mongolian and English. Patient reports minimal sob on 2 L NC, occasional productive cough, and continued difficulty swallowing solids. Patient denies cp, palpitations, abdominal pain, n/v, fever or chills.     DNR on chart: Yes    Allergies    No Known Allergies    Intolerances    MEDICATIONS  (STANDING):  diltiazem    Tablet 30 milliGRAM(s) Oral every 8 hours  finasteride 5 milliGRAM(s) Oral daily  folic acid 1 milliGRAM(s) Oral daily  heparin  Injectable 5000 Unit(s) SubCutaneous two times a day  ipratropium    for Nebulization 500 MICROGram(s) Nebulizer every 6 hours  levothyroxine 75 MICROGram(s) Oral daily  pantoprazole    Tablet 40 milliGRAM(s) Oral two times a day  piperacillin/tazobactam IVPB.. 3.375 Gram(s) IV Intermittent every 8 hours    MEDICATIONS  (PRN):  acetaminophen  Suppository .. 650 milliGRAM(s) Rectal every 6 hours PRN Temp greater or equal to 38C (100.4F)  guaiFENesin    Syrup 200 milliGRAM(s) Oral every 6 hours PRN Cough  HYDROmorphone  Injectable 0.2 milliGRAM(s) IV Push every 1 hour PRN dyspnea  HYDROmorphone  Injectable 0.2 milliGRAM(s) IV Push every 1 hour PRN Moderate Pain (4 - 6)  lidocaine 2% Viscous 5 milliLiter(s) Oral every 4 hours PRN sore throat  LORazepam   Injectable 0.5 milliGRAM(s) IV Push every 2 hours PRN Agitation  prochlorperazine   Tablet 10 milliGRAM(s) Oral every 12 hours PRN reflux      ITEMS UNCHECKED ARE NOT PRESENT    PRESENT SYMPTOMS: [ ]Unable to obtain due to poor mentation   Source if other than patient:  [ ]Family   [ ]Team     Pain: [ ] yes [x] no  QOL impact -   Location -                    Aggravating factors -  Quality -  Radiation -  Timing-  Severity (0-10 scale):  Minimal acceptable level (0-10 scale):     Dyspnea:                           [x]Mild [ ]Moderate [ ]Severe  Anxiety:                             [ ]Mild [ ]Moderate [ ]Severe  Fatigue:                             [x]Mild [ ]Moderate [ ]Severe  Nausea:                             [ ]Mild [ ]Moderate [ ]Severe  Loss of appetite:              [ ]Mild [ ]Moderate [ ]Severe  Constipation:                    [ ]Mild [ ]Moderate [ ]Severe    PAINAD Score:0    [email or web address] (Ctrl +  left click to view)  		  Other Symptoms:  [ ]All other review of systems negative     Karnofsky Performance Score/Palliative Performance Status Version 2: 30-40%        http://npcrc.org/files/news/palliative_performance_scale_ppsv2.pdf  PHYSICAL EXAM:  Vital Signs Last 24 Hrs  T(C): 36.4 (26 Sep 2019 09:03), Max: 36.4 (26 Sep 2019 09:03)  T(F): 97.5 (26 Sep 2019 09:03), Max: 97.5 (26 Sep 2019 09:03)  HR: 111 (26 Sep 2019 09:03) (77 - 111)  BP: 108/62 (26 Sep 2019 09:03) (105/- - 108/66)  BP(mean): 53 (25 Sep 2019 23:59) (53 - 53)  RR: 20 (26 Sep 2019 09:03) (20 - 20)  SpO2: 98% (26 Sep 2019 09:03) (95% - 98%)  --------------------------------------------------------  IN: 0 mL / OUT: 125 mL / NET: -125 mL    GENERAL: Periods of confusion  [x]Alert  [x]Oriented x2   [ ]Lethargic  [ ]Cachexia  [ ]Unarousable  [x]Verbal  [ ]Non-Verbal  Behavioral: calm & cooperative  [ ] Anxiety  [ ] Delirium [ ] Agitation [ ] Other  HEENT: Patient is hard of hearing  [ ]Normal   [x]Dry mouth   [ ]ET Tube/Trach  [ ]Oral lesions  PULMONARY: audible cough  [ ]Clear [x]Tachypnea  [ ]Audible excessive secretions   [x]Rhonchi        []Right [ ]Left [x]Bilateral  [ ]Crackles        [ ]Right [ ]Left [ ]Bilateral  [ ]Wheezing     [ ]Right [ ]Left [ ]Bilateral  CARDIOVASCULAR:    [ ]Regular [ ]Irregular [x]Tachy  [ ]Sukhjinder [ ]Murmur [ ]Other  GASTROINTESTINAL:  [x]Soft  [ ]Distended   [x]+BS  [ ]Non tender [ ]Tender  [ ]PEG [ ]OGT/ NGT   Last BM:     GENITOURINARY:  [ ]Normal [ ] Incontinent   [ ]Oliguria/Anuria   [x]Ayala; chronic ayala (exchanged on admission)  MUSCULOSKELETAL:   [ ]Normal   [x]Weakness  [ ]Bed/Wheelchair bound [x]Edema +2  NEUROLOGIC:   [ ]No focal deficits  [ ] Cognitive impairment  [x] Dysphagia [ ]Dysarthria [ ] Paresis [ ]Other   SKIN:   [ ]Normal   [ ]Pressure ulcer(s)  [ ]Rash [x] please refer RN documentation for further details    CRITICAL CARE:  [ ] Shock Present  [ ]Septic [ ]Cardiogenic [ ]Neurologic [ ]Hypovolemic  [ ]  Vasopressors [ ]  Inotropes   [ ] Respiratory failure present [ ] Mechanical Ventilation [ ] Non-invasive ventilatory support [ ] High-Flow  [ ] Acute  [ ] Chronic [ ] Hypoxic  [ ] Hypercarbic [ ] Other  [ ] Other organ failure;      LABS:   Complete Blood Count in AM (19 @ 08:38)    WBC Count: 12.03 K/uL    RBC Count: 4.53 M/uL    Hemoglobin: 11.6 g/dL    Hematocrit: 38.2 %    Mean Cell Volume: 84.3 fl    Mean Cell Hemoglobin: 25.6 pg    Mean Cell Hemoglobin Conc: 30.4 gm/dL    Red Cell Distrib Width: 16.6 %    Platelet Count - Automated: 320 K/uL    Basic Metabolic Panel in AM (19 @ 07:02)    Sodium, Serum: 137 mmol/L    Potassium, Serum: 4.3 mmol/L    Chloride, Serum: 106 mmol/L    Carbon Dioxide, Serum: 20 mmol/L    Anion Gap, Serum: 11 mmol/L    Blood Urea Nitrogen, Serum: 28 mg/dL    Creatinine, Serum: 1.83 mg/dL    Glucose, Serum: 116 mg/dL    Calcium, Total Serum: 8.9 mg/dL    eGFR if Non : 33: Interpretative comment  The units for eGFR are mL/min/1.73M2 (normalized body surface area).      PTT - ( 24 Sep 2019 18:54 )  PTT:28.6 sec    Urinalysis Basic - ( 24 Sep 2019 19:17 )    Color: Light Yellow / Appearance: Turbid / S.018 / pH: x  Gluc: x / Ketone: Negative  / Bili: Negative / Urobili: Negative   Blood: x / Protein: 100 mg/dL / Nitrite: Positive   Leuk Esterase: Large / RBC: 20 /hpf / WBC >50   Sq Epi: x / Non Sq Epi: 0 / Bacteria: Many      RADIOLOGY & ADDITIONAL STUDIES: < from: Xray Chest 1 View AP/PA (19 @ 19:12) >  FINDINGS:   Patient is rotated.  Hazy opacification of the bilateral lower lungs, left greater than right,   likely representing layering pleural effusions.  Heart size cannot be directly assessed on this projection.  No acute osseous abnormalities.    IMPRESSION:  Bilateral pleural effusions. Underlying pneumonia cannot be ruled out.    < end of copied text >      PROTEIN CALORIE MALNUTRITION:   [x] PPSV2 < or = 30% [ ] significant weight loss [x] poor nutritional intake [x] anasarca [x] catabolic state   Albumin, Serum: 2.2 g/dL (19 @ 18:54)   Artificial Nutrition [ ]     REFERRALS:   [ ]Chaplaincy  [ ] Hospice  [ ]Child Life  [ ]Social Work  [x]Case management [ ]Holistic Therapy [ ] Physical Therapy [ ] Dietary GAP TEAM PALLIATIVE CARE UNIT PROGRESS NOTE:      [x] Patient on hospice program.    INDICATION FOR PALLIATIVE CARE UNIT SERVICES: Patient with advanced gastric ca, symptom management; dyspnea, encephalopathy, need for IV antibiotics; UTI and possible aspiration pneumonia    INTERVAL HPI/OVERNIGHT EVENTS: No acute events overnight. Patient was seen and examined at bedside. According to RN report, family was noted to be feeding patient fruits overnight, concern for further aspiration. Patient is AAOx2, appears in NAD, conversing in both Bulgarian and English. Patient reports minimal sob on 2 L NC, occasional productive cough, and continued difficulty swallowing solids. Patient denies cp, palpitations, abdominal pain, n/v, fever or chills.     DNR on chart: Yes    Allergies    No Known Allergies    Intolerances    MEDICATIONS  (STANDING):  diltiazem    Tablet 30 milliGRAM(s) Oral every 8 hours  finasteride 5 milliGRAM(s) Oral daily  folic acid 1 milliGRAM(s) Oral daily  heparin  Injectable 5000 Unit(s) SubCutaneous two times a day  ipratropium    for Nebulization 500 MICROGram(s) Nebulizer every 6 hours  levothyroxine 75 MICROGram(s) Oral daily  pantoprazole    Tablet 40 milliGRAM(s) Oral two times a day  piperacillin/tazobactam IVPB.. 3.375 Gram(s) IV Intermittent every 8 hours    MEDICATIONS  (PRN):  acetaminophen  Suppository .. 650 milliGRAM(s) Rectal every 6 hours PRN Temp greater or equal to 38C (100.4F)  guaiFENesin    Syrup 200 milliGRAM(s) Oral every 6 hours PRN Cough  HYDROmorphone  Injectable 0.2 milliGRAM(s) IV Push every 1 hour PRN dyspnea  HYDROmorphone  Injectable 0.2 milliGRAM(s) IV Push every 1 hour PRN Moderate Pain (4 - 6)  lidocaine 2% Viscous 5 milliLiter(s) Oral every 4 hours PRN sore throat  LORazepam   Injectable 0.5 milliGRAM(s) IV Push every 2 hours PRN Agitation  prochlorperazine   Tablet 10 milliGRAM(s) Oral every 12 hours PRN reflux      ITEMS UNCHECKED ARE NOT PRESENT    PRESENT SYMPTOMS: [ ]Unable to obtain due to poor mentation   Source if other than patient:  [ ]Family   [ ]Team     Pain: [ ] yes [x] no  QOL impact -   Location -                    Aggravating factors -  Quality -  Radiation -  Timing-  Severity (0-10 scale):  Minimal acceptable level (0-10 scale):     Dyspnea:                           [x]Mild [ ]Moderate [ ]Severe  Anxiety:                             [ ]Mild [ ]Moderate [ ]Severe  Fatigue:                             [x]Mild [ ]Moderate [ ]Severe  Nausea:                             [ ]Mild [ ]Moderate [ ]Severe  Loss of appetite:              [ ]Mild [ ]Moderate [ ]Severe  Constipation:                    [ ]Mild [ ]Moderate [ ]Severe    PAINAD Score:0    [email or web address] (Ctrl +  left click to view)  		  Other Symptoms:  [ ]All other review of systems negative     Karnofsky Performance Score/Palliative Performance Status Version 2: 30-40%        http://npcrc.org/files/news/palliative_performance_scale_ppsv2.pdf  PHYSICAL EXAM:  Vital Signs Last 24 Hrs  T(C): 36.4 (26 Sep 2019 09:03), Max: 36.4 (26 Sep 2019 09:03)  T(F): 97.5 (26 Sep 2019 09:03), Max: 97.5 (26 Sep 2019 09:03)  HR: 111 (26 Sep 2019 09:03) (77 - 111)  BP: 108/62 (26 Sep 2019 09:03) (105/- - 108/66)  BP(mean): 53 (25 Sep 2019 23:59) (53 - 53)  RR: 20 (26 Sep 2019 09:03) (20 - 20)  SpO2: 98% (26 Sep 2019 09:03) (95% - 98%)  --------------------------------------------------------  IN: 0 mL / OUT: 125 mL / NET: -125 mL    GENERAL: Periods of confusion  [x]Alert  [x]Oriented x2   [ ]Lethargic  [ ]Cachexia  [ ]Unarousable  [x]Verbal  [ ]Non-Verbal  Behavioral: calm & cooperative  [ ] Anxiety  [ ] Delirium [ ] Agitation [ ] Other  HEENT: Patient is hard of hearing  [ ]Normal   [x]Dry mouth   [ ]ET Tube/Trach  [ ]Oral lesions  PULMONARY: audible cough  [ ]Clear [x]Tachypnea  [ ]Audible excessive secretions   [x]Rhonchi        []Right [ ]Left [x]Bilateral  [ ]Crackles        [ ]Right [ ]Left [ ]Bilateral  [ ]Wheezing     [ ]Right [ ]Left [ ]Bilateral  CARDIOVASCULAR:    [ ]Regular [ ]Irregular [x]Tachy  [ ]Sukhjinder [ ]Murmur [ ]Other  GASTROINTESTINAL:  [x]Soft  [ ]Distended   [x]+BS  [ ]Non tender [ ]Tender  [ ]PEG [ ]OGT/ NGT   Last BM:     GENITOURINARY:  [ ]Normal [ ] Incontinent   [ ]Oliguria/Anuria   [x]Ayala; chronic ayala (exchanged on admission)  MUSCULOSKELETAL:   [ ]Normal   [x]Weakness  [ ]Bed/Wheelchair bound [x]Edema +2  NEUROLOGIC:   [ ]No focal deficits  [ ] Cognitive impairment  [x] Dysphagia [ ]Dysarthria [ ] Paresis [ ]Other   SKIN:   [ ]Normal   [ ]Pressure ulcer(s)  [ ]Rash [x] please refer RN documentation for further details    CRITICAL CARE:  [ ] Shock Present  [ ]Septic [ ]Cardiogenic [ ]Neurologic [ ]Hypovolemic  [ ]  Vasopressors [ ]  Inotropes   [ ] Respiratory failure present [ ] Mechanical Ventilation [ ] Non-invasive ventilatory support [ ] High-Flow  [ ] Acute  [ ] Chronic [ ] Hypoxic  [ ] Hypercarbic [ ] Other  [ ] Other organ failure;      LABS:   Complete Blood Count in AM (19 @ 08:38)    WBC Count: 12.03 K/uL    RBC Count: 4.53 M/uL    Hemoglobin: 11.6 g/dL    Hematocrit: 38.2 %    Mean Cell Volume: 84.3 fl    Mean Cell Hemoglobin: 25.6 pg    Mean Cell Hemoglobin Conc: 30.4 gm/dL    Red Cell Distrib Width: 16.6 %    Platelet Count - Automated: 320 K/uL    Basic Metabolic Panel in AM (19 @ 07:02)    Sodium, Serum: 137 mmol/L    Potassium, Serum: 4.3 mmol/L    Chloride, Serum: 106 mmol/L    Carbon Dioxide, Serum: 20 mmol/L    Anion Gap, Serum: 11 mmol/L    Blood Urea Nitrogen, Serum: 28 mg/dL    Creatinine, Serum: 1.83 mg/dL    Glucose, Serum: 116 mg/dL    Calcium, Total Serum: 8.9 mg/dL    eGFR if Non : 33: Interpretative comment  The units for eGFR are mL/min/1.73M2 (normalized body surface area).      PTT - ( 24 Sep 2019 18:54 )  PTT:28.6 sec    Urinalysis Basic - ( 24 Sep 2019 19:17 )    Color: Light Yellow / Appearance: Turbid / S.018 / pH: x  Gluc: x / Ketone: Negative  / Bili: Negative / Urobili: Negative   Blood: x / Protein: 100 mg/dL / Nitrite: Positive   Leuk Esterase: Large / RBC: 20 /hpf / WBC >50   Sq Epi: x / Non Sq Epi: 0 / Bacteria: Many      RADIOLOGY & ADDITIONAL STUDIES: < from: Xray Chest 1 View AP/PA (19 @ 19:12) >  FINDINGS:   Patient is rotated.  Hazy opacification of the bilateral lower lungs, left greater than right,   likely representing layering pleural effusions.  Heart size cannot be directly assessed on this projection.  No acute osseous abnormalities.    IMPRESSION:  Bilateral pleural effusions. Underlying pneumonia cannot be ruled out.    < end of copied text >      PROTEIN CALORIE MALNUTRITION:   [x] PPSV2 < or = 30% [ ] significant weight loss [x] poor nutritional intake [x] anasarca [x] catabolic state   Albumin, Serum: 2.2 g/dL (19 @ 18:54)   Artificial Nutrition [ ]     REFERRALS:   [ ]Chaplaincy  [x ] Hospice  [ ]Child Life  [ ]Social Work  [x]Case management [ ]Holistic Therapy [ ] Physical Therapy [ ] Dietary

## 2019-09-26 NOTE — PHYSICAL THERAPY INITIAL EVALUATION ADULT - GAIT DEVIATIONS NOTED, PT EVAL
decreased velocity of limb motion/decreased step length/decreased aakash/decreased weight-shifting ability

## 2019-09-26 NOTE — PROGRESS NOTE ADULT - PROBLEM SELECTOR PLAN 9
Discussed diagnoses, disease trajectory, and treatment options discussed with patient and Jameson Collier (HCP/Son). Explained that aspiration pneumonia may be due to aspiration due to component of dysphagia and possible encephalopathy due to underlying UTI infection. Discussed that further imaging (CT chest) and IVF hydration are not necessary at present time due to improvement initiation of antibiotics and electrolytes that are WNL. Son conveys understanding an agrees with treating underlying infection(s) and symptoms. Patient has MOLST form in chart (DNR/DNI, no feeding tubes, and comfort care). Discussed diagnoses, disease trajectory, and treatment options discussed with patient and Jameson Collier (HCP/Son). Explained that aspiration pneumonia may be due to aspiration due to component of dysphagia and possible encephalopathy due to underlying UTI infection. Discussed that further imaging (CT chest) and IVF hydration are not necessary at present time due to improvement initiation of antibiotics and electrolytes that are WNL. Son conveys understanding an agrees with treating underlying infection(s) and symptoms. Patient has MOLST form in chart (DNR/DNI, no feeding tubes, and comfort care). Will confirm GOC with Jameson Collier.

## 2019-09-26 NOTE — PROGRESS NOTE ADULT - PROBLEM SELECTOR PLAN 3
Most likely 2/2 aspiration pneumonia 2/2 degree of dysphagia, c/b b/l pleural effusions. CXR revealed bilateral pleural effusions. Underlying pneumonia cannot be ruled out. Leukocytosis on presentation.    -c/w Zosyn 3.375 mg IV q8hr (Day 2 of 7)  -c/w Atrovent PRN  -c/w supplemental O2 via 2 L NC Most likely 2/2 aspiration pneumonia 2/2 degree of dysphagia, c/b b/l pleural effusions. CXR revealed bilateral pleural effusions. Underlying pneumonia cannot be ruled out. Leukocytosis on presentation.    -c/w Zosyn 3.375 mg IV q8hr (Day 2 of 7)  -c/w Duonebs q6hr ATC  -c/w supplemental O2 via 2 L NC

## 2019-09-26 NOTE — PHYSICAL THERAPY INITIAL EVALUATION ADULT - PRECAUTIONS/LIMITATIONS, REHAB EVAL
At baseline, pt ambulates slowly with walker and able to walk to the bathroom. Pt is also conversant in both english and Salvadorean. Pt has not required any hospitalizations since last Dec '18, and was in usual state of health until 2 days ago, when he started to cough more. For the past 1 day, pt has been confused, not speaking, not eating. Today, family noticed significant upper airway noises. CXR was performed at home, which showed possible pna, which prompted family to bring pt into the hospital.

## 2019-09-27 PROCEDURE — 99233 SBSQ HOSP IP/OBS HIGH 50: CPT | Mod: GC

## 2019-09-27 RX ADMIN — Medication 3 MILLILITER(S): at 17:42

## 2019-09-27 RX ADMIN — Medication 3 MILLILITER(S): at 05:31

## 2019-09-27 RX ADMIN — Medication 30 MILLIGRAM(S): at 14:42

## 2019-09-27 RX ADMIN — HEPARIN SODIUM 5000 UNIT(S): 5000 INJECTION INTRAVENOUS; SUBCUTANEOUS at 05:31

## 2019-09-27 RX ADMIN — PIPERACILLIN AND TAZOBACTAM 25 GRAM(S): 4; .5 INJECTION, POWDER, LYOPHILIZED, FOR SOLUTION INTRAVENOUS at 17:42

## 2019-09-27 RX ADMIN — Medication 75 MICROGRAM(S): at 05:33

## 2019-09-27 RX ADMIN — Medication 3 MILLILITER(S): at 12:40

## 2019-09-27 RX ADMIN — Medication 200 MILLIGRAM(S): at 05:44

## 2019-09-27 RX ADMIN — HEPARIN SODIUM 5000 UNIT(S): 5000 INJECTION INTRAVENOUS; SUBCUTANEOUS at 17:42

## 2019-09-27 RX ADMIN — Medication 30 MILLIGRAM(S): at 05:31

## 2019-09-27 RX ADMIN — PIPERACILLIN AND TAZOBACTAM 25 GRAM(S): 4; .5 INJECTION, POWDER, LYOPHILIZED, FOR SOLUTION INTRAVENOUS at 02:15

## 2019-09-27 RX ADMIN — PIPERACILLIN AND TAZOBACTAM 25 GRAM(S): 4; .5 INJECTION, POWDER, LYOPHILIZED, FOR SOLUTION INTRAVENOUS at 10:37

## 2019-09-27 RX ADMIN — Medication 10 MILLIGRAM(S): at 12:40

## 2019-09-27 NOTE — PROGRESS NOTE ADULT - PROBLEM SELECTOR PLAN 8
Discussed diagnoses, disease trajectory, and treatment options discussed with patient and Jameson Collier (HCP/Son). Explained that aspiration pneumonia may be due to aspiration due to component of dysphagia and possible encephalopathy due to underlying UTI infection. Discussed that further imaging (CT chest) and IVF hydration are not necessary at present time due to improvement initiation of antibiotics and electrolytes that are WNL. Son conveys understanding an agrees with treating underlying infection(s) and symptoms. Patient has MOLST form in chart (DNR/DNI, no feeding tubes, and comfort care). Will confirm GOC with Jameson Collier. Discussed diagnoses, disease trajectory, and treatment options discussed with patient and Jameson Collier (HCP/Son). Explained that aspiration pneumonia may be due to aspiration due to component of dysphagia and possible encephalopathy due to underlying UTI infection. Discussed that further imaging (CT chest) and IVF hydration are not necessary at present time due to improvement initiation of antibiotics and electrolytes that are WNL. Son conveys understanding an agrees with treating underlying infection(s) and symptoms. Patient has MOLST form in chart (DNR/DNI, no feeding tubes, and comfort care). Will confirm GOC with Jameson Collier.    Plan for d/c back home with [facility] services after completion of course of Zosyn.

## 2019-09-27 NOTE — PROGRESS NOTE ADULT - PROBLEM SELECTOR PLAN 3
Most likely 2/2 aspiration pneumonia 2/2 degree of dysphagia, c/b b/l pleural effusions. CXR revealed bilateral pleural effusions. Underlying pneumonia cannot be ruled out. Leukocytosis on presentation.    -c/w Zosyn 3.375 mg IV q8hr (Day 4 of 7)  -c/w Duonebs q6hr ATC  -c/w supplemental O2 via 2 L NC  - Aspiration precautions

## 2019-09-27 NOTE — PROGRESS NOTE ADULT - ATTENDING COMMENTS
Patient seen and examined and agree with the above documentation with the following additions:   Patient aditted overnight with likely aspiration pneumonia, also dirty UA.  Started empirically on zosyn, will complete total 7 day course. discussed plan of care with patient and son at bedside.  This provider and family share common language, Maltese. electrolytes WNL. Rest of plan as above
I have personally seen and examined this patient and agree with the above assessment and plan, which I have reviewed and edited where appropriate.   Day 4 of 7 for aspiration PNA, possibly bilateral lower lobes.  Prognosis poor.  Pleasure feeds.  Sx management for dyspnea.
Patient seen and examined and agree with the above documentation with the following additions:   Patient continues care under inpatient hospice, receiving abx. Patient with ongoing dysphagia and likely ongoing aspiration.  check EKG today, follow up labs show worsening creatinine. will trial bolus of IVF and recheck. Need ongoing discussion with family about feeding patient with aspiration and that ongoing risk. Monitor electrolytes. follow up with family regarding AC. HCN following. continue care on PCU.

## 2019-09-27 NOTE — PROGRESS NOTE ADULT - PROBLEM SELECTOR PLAN 4
Most likely due to metabolic encephalopathy 2/2 infection (UTI and aspiration pneumonia). Mental status appears to wax and wane and patient has required some IV Ativan for agitation.

## 2019-09-27 NOTE — PROGRESS NOTE ADULT - PROBLEM SELECTOR PLAN 1
PPS 30%, requires assistance with ADL. As per son, patient has been declining and becoming weaker at home over the past month.

## 2019-09-27 NOTE — PROGRESS NOTE ADULT - PROBLEM SELECTOR PLAN 2
Patient noted with new onset a-fib w/ RVR. CHADVASc: 2 (for age > 75).   EKG w/ SVT @ 154 bpm (QTc 448) from 9/27/19.  this AM. Patient on Cardizem 3x/day. In patient's current condition, he is likely not a good candidate for AC.    Echo from 12/18 displayed moderate regurgitation and severe LV dysfunction.     -C/w Cardizem 30 mg po q8hr ATC  - cc IV bolus x1    -Plan to discuss with family possible consequences of irregular heart rhythm and risk/benefit profile of possible anticoagulation in the setting of advanced gastric ca and STELLA on CKD. Patient noted with new onset a-fib w/ RVR. CHADVASc: 2 (for age > 75).   EKG w/ SVT @ 154 bpm (QTc 448) from 9/27/19.  this AM. Patient on Cardizem 3x/day. In patient's current condition, he is likely not a good candidate for AC.  Echo from 12/18 displayed moderate regurgitation and severe LV dysfunction.   -C/w Cardizem 30 mg po q8hr ATC Patient noted with new onset a-fib w/ RVR. CHADVASc: 2 (for age > 75).   EKG w/ SVT @ 154 bpm (QTc 448) from 9/27/19.  this AM. Patient on Cardizem 3x/day. In patient's current condition, the risks outweigh the benefits for AC.  Echo from 12/18 displayed moderate mitral regurgitation and severe LV dysfunction.   -C/w Cardizem 30 mg po q8hr ATC

## 2019-09-27 NOTE — PROGRESS NOTE ADULT - SUBJECTIVE AND OBJECTIVE BOX
GAP TEAM PALLIATIVE CARE UNIT PROGRESS NOTE:      [x] Patient on hospice program.    INDICATION FOR PALLIATIVE CARE UNIT SERVICES: Patient with advanced gastric ca, symptom management; dyspnea, encephalopathy, need for IV antibiotics; UTI and possible aspiration pneumonia    INTERVAL HPI/OVERNIGHT EVENTS: Patient received Ativan x 2 and Robitussin x 2 in past 24 hours. Patient lethargic on assessment this AM, however, received Ativan overnight for agitation. Comfortable appearing.    DNR on chart: Yes    Allergies    No Known Allergies    Intolerances    MEDICATIONS  (STANDING):  ALBUTerol/ipratropium for Nebulization 3 milliLiter(s) Nebulizer every 6 hours  diltiazem    Tablet 30 milliGRAM(s) Oral every 8 hours  folic acid 1 milliGRAM(s) Oral daily  heparin  Injectable 5000 Unit(s) SubCutaneous two times a day  levothyroxine 75 MICROGram(s) Oral daily  piperacillin/tazobactam IVPB.. 3.375 Gram(s) IV Intermittent every 8 hours    MEDICATIONS  (PRN):  acetaminophen  Suppository .. 650 milliGRAM(s) Rectal every 6 hours PRN Temp greater or equal to 38C (100.4F)  bisacodyl Suppository 10 milliGRAM(s) Rectal daily PRN Constipation  guaiFENesin    Syrup 200 milliGRAM(s) Oral every 6 hours PRN Cough  HYDROmorphone  Injectable 0.2 milliGRAM(s) IV Push every 1 hour PRN dyspnea  HYDROmorphone  Injectable 0.2 milliGRAM(s) IV Push every 1 hour PRN Moderate Pain (4 - 6)  lidocaine 2% Viscous 5 milliLiter(s) Oral every 4 hours PRN sore throat  LORazepam   Injectable 0.5 milliGRAM(s) IV Push every 2 hours PRN Agitation  prochlorperazine   Tablet 10 milliGRAM(s) Oral every 12 hours PRN reflux      ITEMS UNCHECKED ARE NOT PRESENT    PRESENT SYMPTOMS: [ x]Unable to obtain due to poor mentation   Source if other than patient:  [ ]Family   [ ]Team     Pain: [ ] yes [x] no  QOL impact -   Location -                    Aggravating factors -  Quality -  Radiation -  Timing-  Severity (0-10 scale):  Minimal acceptable level (0-10 scale):     Dyspnea:                           [x]Mild [ ]Moderate [ ]Severe  Anxiety:                             [ ]Mild [ ]Moderate [ ]Severe  Fatigue:                             [x]Mild [ ]Moderate [ ]Severe  Nausea:                             [ ]Mild [ ]Moderate [ ]Severe  Loss of appetite:              [ ]Mild [ ]Moderate [ ]Severe  Constipation:                    [ ]Mild [ ]Moderate [ ]Severe    PAINAD Score: 0    [email or web address] (Ctrl +  left click to view)  		  Other Symptoms:  [ ]All other review of systems negative     Karnofsky Performance Score/Palliative Performance Status Version 2: 30%        [email or web address]/[email or web address]_performance_scale_[email or web address]  PHYSICAL EXAM:  Vital Signs Last 24 Hrs  T(C): 36.6 (27 Sep 2019 08:06), Max: 36.6 (27 Sep 2019 08:06)  T(F): 97.9 (27 Sep 2019 08:06), Max: 97.9 (27 Sep 2019 08:06)  HR: 120 (27 Sep 2019 08:06) (91 - 120)  BP: 97/67 (27 Sep 2019 08:06) (90/57 - 107/69)  BP(mean): --  RR: 20 (27 Sep 2019 08:06) (20 - 20)  SpO2: 94% (27 Sep 2019 08:06) (94% - 94%)  --------------------------------------------------------  IN: 0 mL / OUT: 125 mL / NET: -125 mL    GENERAL: Periods of confusion  [x]Alert  [x]Oriented x1- 2   [ ]Lethargic  [ ]Cachexia  [ ]Unarousable  [x]Verbal  [ ]Non-Verbal  Behavioral: calm & cooperative  [ ] Anxiety  [ ] Delirium [ ] Agitation [ ] Other  HEENT: Patient is hard of hearing  [ ]Normal   [ ]Dry mouth   [ ]ET Tube/Trach  [ ]Oral lesions  PULMONARY: audible cough  [ ]Clear [x]Tachypnea  [ ]Audible excessive secretions   [x]Rhonchi        []Right [ ]Left [x]Bilateral  [ ]Crackles        [ ]Right [ ]Left [ ]Bilateral  [ ]Wheezing     [ ]Right [ ]Left [ ]Bilateral  CARDIOVASCULAR:    [ ]Regular [ ]Irregular [x]Tachy  [ ]Sukhjinder [ ]Murmur [ ]Other  GASTROINTESTINAL:  [x]Soft  [ ]Distended   [x]+BS  [ ]Non tender [ ]Tender  [ ]PEG [ ]OGT/ NGT   Last BM:     GENITOURINARY:  [ ]Normal [ ] Incontinent   [ ]Oliguria/Anuria   [x]Ayala; chronic ayala (exchanged on admission)  MUSCULOSKELETAL:   [ ]Normal   [x]Weakness  [ ]Bed/Wheelchair bound [x]Edema +2  NEUROLOGIC:   [ ]No focal deficits  [ ] Cognitive impairment  [x] Dysphagia [ ]Dysarthria [ ] Paresis [ ]Other   SKIN:   [ ]Normal   [ ]Pressure ulcer(s)  [ ]Rash [x] please refer RN documentation for further details    CRITICAL CARE:  [ ] Shock Present  [ ]Septic [ ]Cardiogenic [ ]Neurologic [ ]Hypovolemic  [ ]  Vasopressors [ ]  Inotropes   [ ] Respiratory failure present [ ] Mechanical Ventilation [ ] Non-invasive ventilatory support [ ] High-Flow  [ ] Acute  [ ] Chronic [ ] Hypoxic  [ ] Hypercarbic [ ] Other  [ ] Other organ failure;      LABS:   Complete Blood Count in AM (19 @ 08:38)    WBC Count: 12.03 K/uL    RBC Count: 4.53 M/uL    Hemoglobin: 11.6 g/dL    Hematocrit: 38.2 %    Mean Cell Volume: 84.3 fl    Mean Cell Hemoglobin: 25.6 pg    Mean Cell Hemoglobin Conc: 30.4 gm/dL    Red Cell Distrib Width: 16.6 %    Platelet Count - Automated: 320 K/uL    Basic Metabolic Panel in AM (19 @ 07:02)    Sodium, Serum: 137 mmol/L    Potassium, Serum: 4.3 mmol/L    Chloride, Serum: 106 mmol/L    Carbon Dioxide, Serum: 20 mmol/L    Anion Gap, Serum: 11 mmol/L    Blood Urea Nitrogen, Serum: 28 mg/dL    Creatinine, Serum: 1.83 mg/dL    Glucose, Serum: 116 mg/dL    Calcium, Total Serum: 8.9 mg/dL    eGFR if Non : 33: Interpretative comment  The units for eGFR are mL/min/1.73M2 (normalized body surface area).      PTT - ( 24 Sep 2019 18:54 )  PTT:28.6 sec    Urinalysis Basic - ( 24 Sep 2019 19:17 )    Color: Light Yellow / Appearance: Turbid / S.018 / pH: x  Gluc: x / Ketone: Negative  / Bili: Negative / Urobili: Negative   Blood: x / Protein: 100 mg/dL / Nitrite: Positive   Leuk Esterase: Large / RBC: 20 /hpf / WBC >50   Sq Epi: x / Non Sq Epi: 0 / Bacteria: Many      RADIOLOGY & ADDITIONAL STUDIES: < from: Xray Chest 1 View AP/PA (19 @ 19:12) >  FINDINGS:   Patient is rotated.  Hazy opacification of the bilateral lower lungs, left greater than right,   likely representing layering pleural effusions.  Heart size cannot be directly assessed on this projection.  No acute osseous abnormalities.    IMPRESSION:  Bilateral pleural effusions. Underlying pneumonia cannot be ruled out.    < end of copied text >      PROTEIN CALORIE MALNUTRITION:   [x] PPSV2 < or = 30% [ ] significant weight loss [x] poor nutritional intake [x] anasarca [x] catabolic state   Albumin, Serum: 2.2 g/dL (19 @ 18:54)   Artificial Nutrition [ ]     REFERRALS:   [ ]Chaplaincy  [x ] Hospice  [ ]Child Life  [ ]Social Work  [x]Case management [ ]Holistic Therapy [ ] Physical Therapy [ ] Dietary GAP TEAM PALLIATIVE CARE UNIT PROGRESS NOTE:      [x] Patient on hospice program.    INDICATION FOR PALLIATIVE CARE UNIT SERVICES: Patient with advanced gastric ca, symptom management; dyspnea, encephalopathy, need for IV antibiotics; UTI and possible aspiration pneumonia    INTERVAL HPI/OVERNIGHT EVENTS: Patient received Ativan x 2 and Robitussin x 2 in past 24 hours. Patient lethargic on assessment this AM, however, received Ativan overnight for agitation. Comfortable appearing.    DNR on chart: Yes    Allergies    No Known Allergies    Intolerances    MEDICATIONS  (STANDING):  ALBUTerol/ipratropium for Nebulization 3 milliLiter(s) Nebulizer every 6 hours  diltiazem    Tablet 30 milliGRAM(s) Oral every 8 hours  folic acid 1 milliGRAM(s) Oral daily  heparin  Injectable 5000 Unit(s) SubCutaneous two times a day  levothyroxine 75 MICROGram(s) Oral daily  piperacillin/tazobactam IVPB.. 3.375 Gram(s) IV Intermittent every 8 hours    MEDICATIONS  (PRN):  acetaminophen  Suppository .. 650 milliGRAM(s) Rectal every 6 hours PRN Temp greater or equal to 38C (100.4F)  bisacodyl Suppository 10 milliGRAM(s) Rectal daily PRN Constipation  guaiFENesin    Syrup 200 milliGRAM(s) Oral every 6 hours PRN Cough  HYDROmorphone  Injectable 0.2 milliGRAM(s) IV Push every 1 hour PRN dyspnea  HYDROmorphone  Injectable 0.2 milliGRAM(s) IV Push every 1 hour PRN Moderate Pain (4 - 6)  lidocaine 2% Viscous 5 milliLiter(s) Oral every 4 hours PRN sore throat  LORazepam   Injectable 0.5 milliGRAM(s) IV Push every 2 hours PRN Agitation  prochlorperazine   Tablet 10 milliGRAM(s) Oral every 12 hours PRN reflux      ITEMS UNCHECKED ARE NOT PRESENT    PRESENT SYMPTOMS: [ x]Unable to obtain due to poor mentation   Source if other than patient:  [ ]Family   [ ]Team     Pain: [ ] yes [x] no  QOL impact -   Location -                    Aggravating factors -  Quality -  Radiation -  Timing-  Severity (0-10 scale):  Minimal acceptable level (0-10 scale):     Dyspnea:                           [x]Mild [ ]Moderate [ ]Severe  Anxiety:                             [ ]Mild [ ]Moderate [ ]Severe  Fatigue:                             [x]Mild [ ]Moderate [ ]Severe  Nausea:                             [ ]Mild [ ]Moderate [ ]Severe  Loss of appetite:              [ ]Mild [ ]Moderate [ ]Severe  Constipation:                    [ ]Mild [ ]Moderate [ ]Severe    PAINAD Score: 0    [email or web address] (Ctrl +  left click to view)  		  Other Symptoms:  [ ]All other review of systems negative     Karnofsky Performance Score/Palliative Performance Status Version 2: 30%        [email or web address]_scale_[email or web address]  PHYSICAL EXAM:  Vital Signs Last 24 Hrs  T(C): 36.6 (27 Sep 2019 08:06), Max: 36.6 (27 Sep 2019 08:06)  T(F): 97.9 (27 Sep 2019 08:06), Max: 97.9 (27 Sep 2019 08:06)  HR: 120 (27 Sep 2019 08:06) (91 - 120)  BP: 97/67 (27 Sep 2019 08:06) (90/57 - 107/69)  BP(mean): --  RR: 20 (27 Sep 2019 08:06) (20 - 20)  SpO2: 94% (27 Sep 2019 08:06) (94% - 94%)  --------------------------------------------------------  IN: 0 mL / OUT: 125 mL / NET: -125 mL    GENERAL: Periods of confusion  [x]Alert  [x]Oriented x1- 2   [ ]Lethargic  [ ]Cachexia  [ ]Unarousable  [x]Verbal  [ ]Non-Verbal  Behavioral: calm & cooperative  [ ] Anxiety  [ ] Delirium [ ] Agitation [ ] Other  HEENT: Patient is hard of hearing  [ ]Normal   [ ]Dry mouth   [ ]ET Tube/Trach  [ ]Oral lesions  PULMONARY: audible cough  [ ]Clear [x]Tachypnea  [ ]Audible excessive secretions   [x]Rhonchi        []Right [ ]Left [x]Bilateral  [ ]Crackles        [ ]Right [ ]Left [ ]Bilateral  [ ]Wheezing     [ ]Right [ ]Left [ ]Bilateral  CARDIOVASCULAR:    [ ]Regular [ ]Irregular [x]Tachy  [ ]Sukhjinder [ ]Murmur [ ]Other +S1 +S2   GASTROINTESTINAL:  [x]Soft  [ ]Distended   [x]+BS  [ ]Non tender [ ]Tender  [ ]PEG [ ]OGT/ NGT   Last BM:     GENITOURINARY:  [ ]Normal [ ] Incontinent   [ ]Oliguria/Anuria   [x]Ayala; chronic ayala (exchanged on admission)  MUSCULOSKELETAL:   [ ]Normal   [x]Weakness  [ ]Bed/Wheelchair bound [x]Edema +2  NEUROLOGIC:   [ ]No focal deficits  [ ] Cognitive impairment  [x] Dysphagia [ ]Dysarthria [ ] Paresis [ ]Other   SKIN:   [ ]Normal   [ ]Pressure ulcer(s)  [ ]Rash [x] please refer RN documentation for further details    CRITICAL CARE:  [ ] Shock Present  [ ]Septic [ ]Cardiogenic [ ]Neurologic [ ]Hypovolemic  [ ]  Vasopressors [ ]  Inotropes   [ ] Respiratory failure present [ ] Mechanical Ventilation [ ] Non-invasive ventilatory support [ ] High-Flow  [ ] Acute  [ ] Chronic [ ] Hypoxic  [ ] Hypercarbic [ ] Other  [ ] Other organ failure;      LABS:   Complete Blood Count in AM (19 @ 08:38)    WBC Count: 12.03 K/uL    RBC Count: 4.53 M/uL    Hemoglobin: 11.6 g/dL    Hematocrit: 38.2 %    Mean Cell Volume: 84.3 fl    Mean Cell Hemoglobin: 25.6 pg    Mean Cell Hemoglobin Conc: 30.4 gm/dL    Red Cell Distrib Width: 16.6 %    Platelet Count - Automated: 320 K/uL    Basic Metabolic Panel in AM (19 @ 07:02)    Sodium, Serum: 137 mmol/L    Potassium, Serum: 4.3 mmol/L    Chloride, Serum: 106 mmol/L    Carbon Dioxide, Serum: 20 mmol/L    Anion Gap, Serum: 11 mmol/L    Blood Urea Nitrogen, Serum: 28 mg/dL    Creatinine, Serum: 1.83 mg/dL    Glucose, Serum: 116 mg/dL    Calcium, Total Serum: 8.9 mg/dL    eGFR if Non : 33: Interpretative comment  The units for eGFR are mL/min/1.73M2 (normalized body surface area).      PTT - ( 24 Sep 2019 18:54 )  PTT:28.6 sec    Urinalysis Basic - ( 24 Sep 2019 19:17 )    Color: Light Yellow / Appearance: Turbid / S.018 / pH: x  Gluc: x / Ketone: Negative  / Bili: Negative / Urobili: Negative   Blood: x / Protein: 100 mg/dL / Nitrite: Positive   Leuk Esterase: Large / RBC: 20 /hpf / WBC >50   Sq Epi: x / Non Sq Epi: 0 / Bacteria: Many      RADIOLOGY & ADDITIONAL STUDIES: < from: Xray Chest 1 View AP/PA (19 @ 19:12) >  FINDINGS:   Patient is rotated.  Hazy opacification of the bilateral lower lungs, left greater than right,   likely representing layering pleural effusions.  Heart size cannot be directly assessed on this projection.  No acute osseous abnormalities.    IMPRESSION:  Bilateral pleural effusions. Underlying pneumonia cannot be ruled out.    < end of copied text >      PROTEIN CALORIE MALNUTRITION:   [x] PPSV2 < or = 30% [ ] significant weight loss [x] poor nutritional intake [x] anasarca [x] catabolic state   Albumin, Serum: 2.2 g/dL (19 @ 18:54)   Artificial Nutrition [ ]     REFERRALS:   [ ]Chaplaincy  [x ] Hospice  [ ]Child Life  [x ]Social Work  [x]Case management [ ]Holistic Therapy [ ] Physical Therapy [ ] Dietary

## 2019-09-28 PROCEDURE — 99233 SBSQ HOSP IP/OBS HIGH 50: CPT | Mod: GC

## 2019-09-28 RX ADMIN — PIPERACILLIN AND TAZOBACTAM 25 GRAM(S): 4; .5 INJECTION, POWDER, LYOPHILIZED, FOR SOLUTION INTRAVENOUS at 10:09

## 2019-09-28 RX ADMIN — Medication 3 MILLILITER(S): at 23:38

## 2019-09-28 RX ADMIN — HEPARIN SODIUM 5000 UNIT(S): 5000 INJECTION INTRAVENOUS; SUBCUTANEOUS at 05:11

## 2019-09-28 RX ADMIN — HYDROMORPHONE HYDROCHLORIDE 0.2 MILLIGRAM(S): 2 INJECTION INTRAMUSCULAR; INTRAVENOUS; SUBCUTANEOUS at 14:19

## 2019-09-28 RX ADMIN — Medication 3 MILLILITER(S): at 05:11

## 2019-09-28 RX ADMIN — HYDROMORPHONE HYDROCHLORIDE 0.2 MILLIGRAM(S): 2 INJECTION INTRAMUSCULAR; INTRAVENOUS; SUBCUTANEOUS at 01:39

## 2019-09-28 RX ADMIN — Medication 0.5 MILLIGRAM(S): at 01:39

## 2019-09-28 RX ADMIN — HYDROMORPHONE HYDROCHLORIDE 0.2 MILLIGRAM(S): 2 INJECTION INTRAMUSCULAR; INTRAVENOUS; SUBCUTANEOUS at 05:54

## 2019-09-28 RX ADMIN — PIPERACILLIN AND TAZOBACTAM 25 GRAM(S): 4; .5 INJECTION, POWDER, LYOPHILIZED, FOR SOLUTION INTRAVENOUS at 01:20

## 2019-09-28 RX ADMIN — PIPERACILLIN AND TAZOBACTAM 25 GRAM(S): 4; .5 INJECTION, POWDER, LYOPHILIZED, FOR SOLUTION INTRAVENOUS at 17:55

## 2019-09-28 RX ADMIN — Medication 3 MILLILITER(S): at 00:30

## 2019-09-28 RX ADMIN — Medication 3 MILLILITER(S): at 17:55

## 2019-09-28 RX ADMIN — Medication 3 MILLILITER(S): at 12:54

## 2019-09-28 RX ADMIN — HYDROMORPHONE HYDROCHLORIDE 0.2 MILLIGRAM(S): 2 INJECTION INTRAMUSCULAR; INTRAVENOUS; SUBCUTANEOUS at 21:36

## 2019-09-28 RX ADMIN — HEPARIN SODIUM 5000 UNIT(S): 5000 INJECTION INTRAVENOUS; SUBCUTANEOUS at 17:56

## 2019-09-28 NOTE — PROGRESS NOTE ADULT - PROBLEM SELECTOR PLAN 1
Most likely 2/2 aspiration pneumonia 2/2 degree of dysphagia, c/b b/l pleural effusions. CXR revealed bilateral pleural effusions. Underlying pneumonia cannot be ruled out. Leukocytosis on presentation.  -c/w Zosyn 3.375 mg IV q8hr (Day 5 of 7)  -c/w Duonebs q6hr ATC  -c/w supplemental O2 via 2 L NC  - Aspiration precautions

## 2019-09-28 NOTE — PROGRESS NOTE ADULT - SUBJECTIVE AND OBJECTIVE BOX
GAP TEAM PALLIATIVE CARE UNIT PROGRESS NOTE:      [ x ] Patient on hospice program.    INDICATION FOR PALLIATIVE CARE UNIT SERVICES:  Patient with advanced gastric ca, symptom management; dyspnea, encephalopathy, need for IV antibiotics; UTI and possible aspiration pneumonia    INTERVAL HPI/OVERNIGHT EVENTS:  Used 2 prn of dilaudid and 1 of ativan in the last 24 hours.     DNR on chart: Yes    Allergies    No Known Allergies    Intolerances    MEDICATIONS  (STANDING):  ALBUTerol/ipratropium for Nebulization 3 milliLiter(s) Nebulizer every 6 hours  diltiazem    Tablet 30 milliGRAM(s) Oral every 8 hours  folic acid 1 milliGRAM(s) Oral daily  heparin  Injectable 5000 Unit(s) SubCutaneous two times a day  levothyroxine 75 MICROGram(s) Oral daily  piperacillin/tazobactam IVPB.. 3.375 Gram(s) IV Intermittent every 8 hours    MEDICATIONS  (PRN):  acetaminophen  Suppository .. 650 milliGRAM(s) Rectal every 6 hours PRN Temp greater or equal to 38C (100.4F)  bisacodyl Suppository 10 milliGRAM(s) Rectal daily PRN Constipation  guaiFENesin    Syrup 200 milliGRAM(s) Oral every 6 hours PRN Cough  HYDROmorphone  Injectable 0.2 milliGRAM(s) IV Push every 1 hour PRN dyspnea  HYDROmorphone  Injectable 0.2 milliGRAM(s) IV Push every 1 hour PRN Moderate Pain (4 - 6)  lidocaine 2% Viscous 5 milliLiter(s) Oral every 4 hours PRN sore throat  LORazepam   Injectable 0.5 milliGRAM(s) IV Push every 2 hours PRN Agitation  prochlorperazine   Tablet 10 milliGRAM(s) Oral every 12 hours PRN reflux      ITEMS UNCHECKED ARE NOT PRESENT    PRESENT SYMPTOMS: [  ]Unable to obtain due to poor mentation   Source if other than patient:  [ ]Family   [ ]Team     ain: [ ] yes [ x ] no  QOL impact -   Location -                    Aggravating factors -  Quality -  Radiation -  Timing-  Severity (0-10 scale):  Minimal acceptable level (0-10 scale):     Dyspnea:                           [x]Mild [ ]Moderate [ ]Severe  Anxiety:                             [ ]Mild [ ]Moderate [ ]Severe  Fatigue:                             [x]Mild [ ]Moderate [ ]Severe  Nausea:                             [ ]Mild [ ]Moderate [ ]Severe  Loss of appetite:              [ ]Mild [ ]Moderate [ ]Severe  Constipation:                    [ ]Mild [ ]Moderate [ ]Severe    PAINAD Score: 0    [email or web address] (Ctrl +  left click to view)  		  Other Symptoms:  [ ]All other review of systems negative     Karnofsky Performance Score/Palliative Performance Status Version 2: 30%          [email or web address]/files/news/palliative_performance_scale_[email or web address].pdf    PHYSICAL EXAM:  Vital Signs Last 24 Hrs  T(C): 36.4 (28 Sep 2019 07:41), Max: 36.4 (28 Sep 2019 07:41)  T(F): 97.5 (28 Sep 2019 07:41), Max: 97.5 (28 Sep 2019 07:41)  HR: 176 (28 Sep 2019 07:41) (65 - 176)  BP: 73/50 (28 Sep 2019 07:41) (73/50 - 103/62)  BP(mean): --  RR: 16 (28 Sep 2019 07:41) (16 - 16)  SpO2: 97% (28 Sep 2019 07:41) (97% - 97%) I&O's Summary    27 Sep 2019 07:01  -  28 Sep 2019 07:00  --------------------------------------------------------  IN: 200 mL / OUT: 250 mL / NET: -50 mL    GENERAL: Periods of confusion  [x]Alert  [x]Oriented x1- 2   [ ]Lethargic  [ ]Cachexia  [ ]Unarousable  [x]Verbal  [ ]Non-Verbal  Behavioral: calm & cooperative  [ ] Anxiety  [ ] Delirium [ ] Agitation [ ] Other  HEENT: Patient is hard of hearing  [ ]Normal   [ ]Dry mouth   [ ]ET Tube/Trach  [ ]Oral lesions  PULMONARY: audible cough  [ ]Clear [x]Tachypnea  [ ]Audible excessive secretions   [x]Rhonchi        []Right [ ]Left [x]Bilateral  [ ]Crackles        [ ]Right [ ]Left [ ]Bilateral  [ ]Wheezing     [ ]Right [ ]Left [ ]Bilateral  CARDIOVASCULAR:    [ ]Regular [ ]Irregular [x]Tachy  [ ]Sukhjinder [ ]Murmur [ ]Other +S1 +S2   GASTROINTESTINAL:  [x]Soft  [ ]Distended   [x]+BS  [ ]Non tender [ ]Tender  [ ]PEG [ ]OGT/ NGT   Last BM:   9/23  GENITOURINARY:  [ ]Normal [ ] Incontinent   [ ]Oliguria/Anuria   [x]Ayala; chronic ayala (exchanged on admission)  MUSCULOSKELETAL:   [ ]Normal   [x]Weakness  [ ]Bed/Wheelchair bound [x]Edema +2  NEUROLOGIC:   [ ]No focal deficits  [ ] Cognitive impairment  [x] Dysphagia [ ]Dysarthria [ ] Paresis [ ]Other   SKIN:   [ ]Normal   [ ]Pressure ulcer(s)  [ ]Rash [x] please refer RN documentation for further details      CRITICAL CARE:  [ ] Shock Present  [ ]Septic [ ]Cardiogenic [ ]Neurologic [ ]Hypovolemic  [ ]  Vasopressors [ ]  Inotropes   [ ] Respiratory failure present [ ] Mechanical Ventilation [ ] Non-invasive ventilatory support [ ] High-Flow  [ ] Acute  [ ] Chronic [ ] Hypoxic  [ ] Hypercarbic [ ] Other  [ ] Other organ failure     LABS:  No new laboratory    RADIOLOGY & ADDITIONAL STUDIES:  No new imaging    PROTEIN CALORIE MALNUTRITION:   [ x ] PPSV2 < or = 30% [ ] significant weight loss [ ] poor nutritional intake [ ] anasarca [ ] catabolic state   Albumin, Serum: 2.2 g/dL (09-24-19 @ 18:54)   Artificial Nutrition [ ]     REFERRALS:   [ ]Chaplaincy  [ ] Hospice  [ ]Child Life  [ ]Social Work  [ ]Case management [ ]Holistic Therapy [ ] Physical Therapy [ ] Dietary     Goals of Care Document:

## 2019-09-28 NOTE — PROGRESS NOTE ADULT - PROBLEM SELECTOR PLAN 2
Most likely due to metabolic encephalopathy 2/2 infection (UTI and aspiration pneumonia). Mental status appears to wax and wane and patient has required some IV Ativan for agitation.  Used 1 prn of ativan in the last 24 hours.

## 2019-09-28 NOTE — PROGRESS NOTE ADULT - PROBLEM SELECTOR PLAN 8
Discussed diagnoses, disease trajectory, and treatment options discussed with patient and Jameson Collier (HCP/Son). Explained that aspiration pneumonia may be due to aspiration due to component of dysphagia and possible encephalopathy due to underlying UTI infection. Discussed that further imaging (CT chest) and IVF hydration are not necessary at present time due to improvement initiation of antibiotics and electrolytes that are WNL. Son conveys understanding an agrees with treating underlying infection(s) and symptoms. Patient has MOLST form in chart (DNR/DNI, no feeding tubes, and comfort care). Will confirm GOC with Jameson Collier.  Plan for d/c back home with [facility] services after completion of course of Zosyn.

## 2019-09-28 NOTE — PROGRESS NOTE ADULT - PROBLEM SELECTOR PLAN 3
Patient noted with new onset a-fib w/ RVR. CHADVASc: 2 (for age > 75).   EKG w/ SVT @ 154 bpm (QTc 448) from 9/27/19. Patient on Cardizem 3x/day. In patient's current condition, the risks outweigh the benefits for AC.  Echo from 12/18 displayed moderate mitral regurgitation and severe LV dysfunction.   Patient tachycardic on todays examination with HR of 175  -C/w Cardizem 30 mg po q8hr ATC

## 2019-09-29 DIAGNOSIS — R53.2 FUNCTIONAL QUADRIPLEGIA: ICD-10-CM

## 2019-09-29 LAB
CULTURE RESULTS: SIGNIFICANT CHANGE UP
CULTURE RESULTS: SIGNIFICANT CHANGE UP
SPECIMEN SOURCE: SIGNIFICANT CHANGE UP
SPECIMEN SOURCE: SIGNIFICANT CHANGE UP

## 2019-09-29 PROCEDURE — 99233 SBSQ HOSP IP/OBS HIGH 50: CPT | Mod: GC

## 2019-09-29 RX ORDER — HYDROMORPHONE HYDROCHLORIDE 2 MG/ML
0.4 INJECTION INTRAMUSCULAR; INTRAVENOUS; SUBCUTANEOUS
Refills: 0 | Status: DISCONTINUED | OUTPATIENT
Start: 2019-09-29 | End: 2019-10-02

## 2019-09-29 RX ORDER — ROBINUL 0.2 MG/ML
0.4 INJECTION INTRAMUSCULAR; INTRAVENOUS EVERY 4 HOURS
Refills: 0 | Status: DISCONTINUED | OUTPATIENT
Start: 2019-09-29 | End: 2019-09-30

## 2019-09-29 RX ORDER — PROCHLORPERAZINE MALEATE 5 MG
10 TABLET ORAL EVERY 12 HOURS
Refills: 0 | Status: DISCONTINUED | OUTPATIENT
Start: 2019-09-29 | End: 2019-10-02

## 2019-09-29 RX ORDER — HYDROMORPHONE HYDROCHLORIDE 2 MG/ML
0.2 INJECTION INTRAMUSCULAR; INTRAVENOUS; SUBCUTANEOUS EVERY 6 HOURS
Refills: 0 | Status: DISCONTINUED | OUTPATIENT
Start: 2019-09-29 | End: 2019-10-02

## 2019-09-29 RX ADMIN — HYDROMORPHONE HYDROCHLORIDE 0.2 MILLIGRAM(S): 2 INJECTION INTRAMUSCULAR; INTRAVENOUS; SUBCUTANEOUS at 12:27

## 2019-09-29 RX ADMIN — ROBINUL 0.4 MILLIGRAM(S): 0.2 INJECTION INTRAMUSCULAR; INTRAVENOUS at 17:10

## 2019-09-29 RX ADMIN — HEPARIN SODIUM 5000 UNIT(S): 5000 INJECTION INTRAVENOUS; SUBCUTANEOUS at 17:11

## 2019-09-29 RX ADMIN — ROBINUL 0.4 MILLIGRAM(S): 0.2 INJECTION INTRAMUSCULAR; INTRAVENOUS at 21:16

## 2019-09-29 RX ADMIN — HYDROMORPHONE HYDROCHLORIDE 0.4 MILLIGRAM(S): 2 INJECTION INTRAMUSCULAR; INTRAVENOUS; SUBCUTANEOUS at 10:06

## 2019-09-29 RX ADMIN — HYDROMORPHONE HYDROCHLORIDE 0.2 MILLIGRAM(S): 2 INJECTION INTRAMUSCULAR; INTRAVENOUS; SUBCUTANEOUS at 06:07

## 2019-09-29 RX ADMIN — HEPARIN SODIUM 5000 UNIT(S): 5000 INJECTION INTRAVENOUS; SUBCUTANEOUS at 05:21

## 2019-09-29 RX ADMIN — ROBINUL 0.4 MILLIGRAM(S): 0.2 INJECTION INTRAMUSCULAR; INTRAVENOUS at 10:48

## 2019-09-29 RX ADMIN — PIPERACILLIN AND TAZOBACTAM 25 GRAM(S): 4; .5 INJECTION, POWDER, LYOPHILIZED, FOR SOLUTION INTRAVENOUS at 17:10

## 2019-09-29 RX ADMIN — PIPERACILLIN AND TAZOBACTAM 25 GRAM(S): 4; .5 INJECTION, POWDER, LYOPHILIZED, FOR SOLUTION INTRAVENOUS at 09:58

## 2019-09-29 RX ADMIN — HYDROMORPHONE HYDROCHLORIDE 0.2 MILLIGRAM(S): 2 INJECTION INTRAMUSCULAR; INTRAVENOUS; SUBCUTANEOUS at 08:00

## 2019-09-29 RX ADMIN — HYDROMORPHONE HYDROCHLORIDE 0.2 MILLIGRAM(S): 2 INJECTION INTRAMUSCULAR; INTRAVENOUS; SUBCUTANEOUS at 17:10

## 2019-09-29 RX ADMIN — Medication 0.5 MILLIGRAM(S): at 08:16

## 2019-09-29 RX ADMIN — HYDROMORPHONE HYDROCHLORIDE 0.2 MILLIGRAM(S): 2 INJECTION INTRAMUSCULAR; INTRAVENOUS; SUBCUTANEOUS at 23:42

## 2019-09-29 RX ADMIN — Medication 3 MILLILITER(S): at 06:55

## 2019-09-29 RX ADMIN — PIPERACILLIN AND TAZOBACTAM 25 GRAM(S): 4; .5 INJECTION, POWDER, LYOPHILIZED, FOR SOLUTION INTRAVENOUS at 02:20

## 2019-09-29 NOTE — PROGRESS NOTE ADULT - PROBLEM SELECTOR PLAN 1
Most likely 2/2 aspiration pneumonia 2/2 degree of dysphagia, c/b b/l pleural effusions. CXR revealed bilateral pleural effusions. Underlying pneumonia cannot be ruled out. Leukocytosis on presentation.  -c/w Zosyn 3.375 mg IV q8hr (Day 6 of 7)  -D/C Duonebs q6hr ATC  -c/w supplemental O2 via 2 L NC  - Aspiration precautions.

## 2019-09-29 NOTE — PROGRESS NOTE ADULT - SUBJECTIVE AND OBJECTIVE BOX
GAP TEAM PALLIATIVE CARE UNIT PROGRESS NOTE:      [ x ] Patient on hospice program.    INDICATION FOR PALLIATIVE CARE UNIT SERVICES:  Patient with advanced gastric ca, symptom management; dyspnea, encephalopathy, need for IV antibiotics; UTI and possible aspiration pneumonia    INTERVAL HPI/OVERNIGHT EVENTS:  Used 3 prn of dilaudid and 1 of ativan in the last 24 hours.   Patient has not taken any PO medication for the past 24 hours.    DNR on chart: Yes    Allergies    No Known Allergies    Intolerances    MEDICATIONS  (STANDING):  heparin  Injectable 5000 Unit(s) SubCutaneous two times a day  HYDROmorphone  Injectable 0.2 milliGRAM(s) IV Push every 6 hours  piperacillin/tazobactam IVPB.. 3.375 Gram(s) IV Intermittent every 8 hours    MEDICATIONS  (PRN):  acetaminophen  Suppository .. 650 milliGRAM(s) Rectal every 6 hours PRN Temp greater or equal to 38C (100.4F)  bisacodyl Suppository 10 milliGRAM(s) Rectal daily PRN Constipation  glycopyrrolate Injectable 0.4 milliGRAM(s) IV Push every 4 hours PRN Oral Secretions  HYDROmorphone  Injectable 0.4 milliGRAM(s) IV Push every 1 hour PRN dyspnea  HYDROmorphone  Injectable 0.4 milliGRAM(s) IV Push every 1 hour PRN Moderate Pain (4 - 6)  LORazepam   Injectable 0.5 milliGRAM(s) IV Push every 1 hour PRN Agitation  prochlorperazine   Injectable 10 milliGRAM(s) IV Push every 12 hours PRN N/V      ITEMS UNCHECKED ARE NOT PRESENT    PRESENT SYMPTOMS: [  ]Unable to obtain due to poor mentation   Source if other than patient:  [ ]Family   [ ]Team     ain: [ ] yes [ x ] no  QOL impact -   Location -                    Aggravating factors -  Quality -  Radiation -  Timing-  Severity (0-10 scale):  Minimal acceptable level (0-10 scale):     Dyspnea:                           [x]Mild [ ]Moderate [ ]Severe  Anxiety:                             [ ]Mild [ ]Moderate [ ]Severe  Fatigue:                             [x]Mild [ ]Moderate [ ]Severe  Nausea:                             [ ]Mild [ ]Moderate [ ]Severe  Loss of appetite:              [ ]Mild [ ]Moderate [ ]Severe  Constipation:                    [ ]Mild [ ]Moderate [ ]Severe    PAINAD Score: 0    [email or web address]/[email or web address] (Ctrl +  left click to view)  		  Other Symptoms:  [ ]All other review of systems negative     Karnofsky Performance Score/Palliative Performance Status Version 2: 30%      http://npcrc.org/files/news/palliative_performance_scale_ppsv2.pdf    PHYSICAL EXAM:  Vital Signs Last 24 Hrs  T(C): --  T(F): --  HR: --  BP: --  BP(mean): --  RR: --  SpO2: -- I&O's Summary    28 Sep 2019 07:01  -  29 Sep 2019 07:00  --------------------------------------------------------  IN: 0 mL / OUT: 550 mL / NET: -550 mL    GENERAL: Periods of confusion  [ ]Alert  [ ]Oriented    [ x ]Lethargic  [ ]Cachexia  [ x ]Unarousable  [ ]Verbal  [ x ]Non-Verbal  Behavioral: calm & cooperative  [ ] Anxiety  [ ] Delirium [ ] Agitation [ ] Other  HEENT: Patient is hard of hearing  [ ]Normal   [ ]Dry mouth   [ ]ET Tube/Trach  [ ]Oral lesions  PULMONARY: audible cough  [ ]Clear [x]Tachypnea  [ ]Audible excessive secretions   [x]Rhonchi        []Right [ ]Left [x]Bilateral  [ ]Crackles        [ ]Right [ ]Left [ ]Bilateral  [ ]Wheezing     [ ]Right [ ]Left [ ]Bilateral  CARDIOVASCULAR:    [ ]Regular [ ]Irregular [x]Tachy  [ ]Sukhjinder [ ]Murmur [ ]Other +S1 +S2   GASTROINTESTINAL:  [x]Soft  [ ]Distended   [x]+BS  [ ]Non tender [ ]Tender  [ ]PEG [ ]OGT/ NGT   Last BM:   9/23  GENITOURINARY:  [ ]Normal [ ] Incontinent   [ ]Oliguria/Anuria   [x]Ayala; chronic ayala (exchanged on admission)  MUSCULOSKELETAL:   [ ]Normal   [x]Weakness  [ ]Bed/Wheelchair bound [x]Edema +2  NEUROLOGIC:   [ ]No focal deficits  [ ] Cognitive impairment  [x] Dysphagia [ ]Dysarthria [ ] Paresis [ ]Other   SKIN:   [ ]Normal   [ ]Pressure ulcer(s)  [ ]Rash [x] please refer RN documentation for further details    CRITICAL CARE:  [ ] Shock Present  [ ]Septic [ ]Cardiogenic [ ]Neurologic [ ]Hypovolemic  [ ]  Vasopressors [ ]  Inotropes   [ ] Respiratory failure present [ ] Mechanical Ventilation [ ] Non-invasive ventilatory support [ ] High-Flow  [ ] Acute  [ ] Chronic [ ] Hypoxic  [ ] Hypercarbic [ ] Other  [ ] Other organ failure     LABS:  None new    RADIOLOGY & ADDITIONAL STUDIES:  None new    PROTEIN CALORIE MALNUTRITION:   [ x ] PPSV2 < or = 30% [ ] significant weight loss [ ] poor nutritional intake [ ] anasarca [ ] catabolic state   Albumin, Serum: 2.2 g/dL (09-24-19 @ 18:54)   Artificial Nutrition [ ]     REFERRALS:   [ ]Chaplaincy  [ ] Hospice  [ ]Child Life  [ ]Social Work  [ ]Case management [ ]Holistic Therapy [ ] Physical Therapy [ ] Dietary     Goals of Care Document:

## 2019-09-29 NOTE — PROGRESS NOTE ADULT - PROBLEM SELECTOR PLAN 3
Patient noted with new onset a-fib w/ RVR. CHADVASc: 2 (for age > 75).   EKG w/ SVT @ 154 bpm (QTc 448) from 9/27/19. Patient on Cardizem 3x/day. In patient's current condition, the risks outweigh the benefits for AC.  Echo from 12/18 displayed moderate mitral regurgitation and severe LV dysfunction.   Patient tachycardic on todays examination with HR of 176  -D/C Cardizem 30 mg po q8hr ATC as per patient not taking PO

## 2019-09-30 PROCEDURE — 99233 SBSQ HOSP IP/OBS HIGH 50: CPT

## 2019-09-30 RX ORDER — SCOPALAMINE 1 MG/3D
1 PATCH, EXTENDED RELEASE TRANSDERMAL
Refills: 0 | Status: DISCONTINUED | OUTPATIENT
Start: 2019-09-30 | End: 2019-10-02

## 2019-09-30 RX ORDER — ROBINUL 0.2 MG/ML
0.4 INJECTION INTRAMUSCULAR; INTRAVENOUS EVERY 4 HOURS
Refills: 0 | Status: DISCONTINUED | OUTPATIENT
Start: 2019-09-30 | End: 2019-10-02

## 2019-09-30 RX ADMIN — ROBINUL 0.4 MILLIGRAM(S): 0.2 INJECTION INTRAMUSCULAR; INTRAVENOUS at 21:08

## 2019-09-30 RX ADMIN — SCOPALAMINE 1 PATCH: 1 PATCH, EXTENDED RELEASE TRANSDERMAL at 19:40

## 2019-09-30 RX ADMIN — HYDROMORPHONE HYDROCHLORIDE 0.2 MILLIGRAM(S): 2 INJECTION INTRAMUSCULAR; INTRAVENOUS; SUBCUTANEOUS at 11:31

## 2019-09-30 RX ADMIN — SCOPALAMINE 1 PATCH: 1 PATCH, EXTENDED RELEASE TRANSDERMAL at 13:26

## 2019-09-30 RX ADMIN — HEPARIN SODIUM 5000 UNIT(S): 5000 INJECTION INTRAVENOUS; SUBCUTANEOUS at 18:26

## 2019-09-30 RX ADMIN — PIPERACILLIN AND TAZOBACTAM 25 GRAM(S): 4; .5 INJECTION, POWDER, LYOPHILIZED, FOR SOLUTION INTRAVENOUS at 11:30

## 2019-09-30 RX ADMIN — HYDROMORPHONE HYDROCHLORIDE 0.2 MILLIGRAM(S): 2 INJECTION INTRAMUSCULAR; INTRAVENOUS; SUBCUTANEOUS at 18:26

## 2019-09-30 RX ADMIN — HEPARIN SODIUM 5000 UNIT(S): 5000 INJECTION INTRAVENOUS; SUBCUTANEOUS at 05:05

## 2019-09-30 RX ADMIN — ROBINUL 0.4 MILLIGRAM(S): 0.2 INJECTION INTRAMUSCULAR; INTRAVENOUS at 01:30

## 2019-09-30 RX ADMIN — PIPERACILLIN AND TAZOBACTAM 25 GRAM(S): 4; .5 INJECTION, POWDER, LYOPHILIZED, FOR SOLUTION INTRAVENOUS at 18:25

## 2019-09-30 RX ADMIN — ROBINUL 0.4 MILLIGRAM(S): 0.2 INJECTION INTRAMUSCULAR; INTRAVENOUS at 05:17

## 2019-09-30 RX ADMIN — HYDROMORPHONE HYDROCHLORIDE 0.2 MILLIGRAM(S): 2 INJECTION INTRAMUSCULAR; INTRAVENOUS; SUBCUTANEOUS at 05:05

## 2019-09-30 RX ADMIN — ROBINUL 0.4 MILLIGRAM(S): 0.2 INJECTION INTRAMUSCULAR; INTRAVENOUS at 13:26

## 2019-09-30 RX ADMIN — PIPERACILLIN AND TAZOBACTAM 25 GRAM(S): 4; .5 INJECTION, POWDER, LYOPHILIZED, FOR SOLUTION INTRAVENOUS at 01:30

## 2019-09-30 RX ADMIN — ROBINUL 0.4 MILLIGRAM(S): 0.2 INJECTION INTRAMUSCULAR; INTRAVENOUS at 18:26

## 2019-09-30 NOTE — PROGRESS NOTE ADULT - PROBLEM SELECTOR PLAN 1
Controlled.   Most likely 2/2 aspiration pneumonia 2/2 dysphagia and worsening oropharyngeal secretions, c/b b/l pleural effusions. CXR revealed bilateral pleural effusions. Underlying pneumonia cannot be ruled out. Leukocytosis on presentation.  -c/w Zosyn 3.375 mg IV q8hr (Day 7 of 7)  -D/C Duonebs q6hr ATC  -c/w supplemental O2 via 2 L NC  -will start Glyco 0.4 mg q 4 ATC and Scopolamine Patch q 72 hours.   - Aspiration precautions.

## 2019-09-30 NOTE — PROGRESS NOTE ADULT - PROBLEM SELECTOR PLAN 2
Most likely due to metabolic encephalopathy and or Delirium that are 2/2 infection (UTI and aspiration pneumonia), CA, poor oral intake, and medications.  Continue Ativan 0.5 mg IV q 2 PRN

## 2019-09-30 NOTE — PROGRESS NOTE ADULT - SUBJECTIVE AND OBJECTIVE BOX
GAP TEAM PALLIATIVE CARE UNIT PROGRESS NOTE:      [ x ] Patient on hospice program.    INDICATION FOR PALLIATIVE CARE UNIT SERVICES:  Patient with advanced gastric ca. In PCU for symptom management including dyspnea, encephalopathy, oropharyngeal secretions. Furthermore on IV antibiotics for UTI vs possible aspiration pneumonia.     INTERVAL HPI/OVERNIGHT EVENTS:  Over 24 hours the patient used x 5 doses of Glyco 0.4 mg IV, x 1 dose of Ativan 0.5 mg and x 1 dose of Dilaudid 0.4 mg.   He was lethargic but intermittently alert and able to f/u simple commands. He was having oropharyngeal secretions.      DNR on chart: Yes    Allergies    No Known Allergies    Intolerances    MEDICATIONS  (STANDING):  glycopyrrolate Injectable 0.4 milliGRAM(s) IV Push every 4 hours  heparin  Injectable 5000 Unit(s) SubCutaneous two times a day  HYDROmorphone  Injectable 0.2 milliGRAM(s) IV Push every 6 hours  piperacillin/tazobactam IVPB.. 3.375 Gram(s) IV Intermittent every 8 hours  scopolamine   Patch 1 Patch Transdermal every 72 hours    MEDICATIONS  (PRN):  acetaminophen  Suppository .. 650 milliGRAM(s) Rectal every 6 hours PRN Temp greater or equal to 38C (100.4F)  bisacodyl Suppository 10 milliGRAM(s) Rectal daily PRN Constipation  HYDROmorphone  Injectable 0.4 milliGRAM(s) IV Push every 1 hour PRN dyspnea  HYDROmorphone  Injectable 0.4 milliGRAM(s) IV Push every 1 hour PRN Moderate Pain (4 - 6)  LORazepam   Injectable 0.5 milliGRAM(s) IV Push every 1 hour PRN Agitation  prochlorperazine   Injectable 10 milliGRAM(s) IV Push every 12 hours PRN N/V        ITEMS UNCHECKED ARE NOT PRESENT    PRESENT SYMPTOMS: [  x]Unable to obtain due to poor mentation   Source if other than patient:  [ ]Family   [ ]Team     Pain: [ ] yes [ x ] no  QOL impact -   Location -                    Aggravating factors -  Quality -  Radiation -  Timing-  Severity (0-10 scale):  Minimal acceptable level (0-10 scale):     Dyspnea:                           [ ]Mild [ ]Moderate [ ]Severe  Anxiety:                             [ ]Mild [ ]Moderate [ ]Severe  Fatigue:                             [ ]Mild [ ]Moderate [x ]Severe  Nausea:                             [ ]Mild [ ]Moderate [ ]Severe  Loss of appetite:              [ ]Mild [ ]Moderate [ ]Severe  Constipation:                    [ ]Mild [ ]Moderate [ ]Severe    PAINAD Score: 0    [email or web address] (Ctrl +  left click to view)  		  Other Symptoms:  [ ]All other review of systems negative     Karnofsky Performance Score/Palliative Performance Status Version 2: 30%      [email or web address]/files/[email or web address]_performance_scale_[email or web address].pdf    PHYSICAL EXAM:  Vital Signs Last 24 Hrs  T(C): 36.5 (30 Sep 2019 08:53), Max: 36.5 (30 Sep 2019 08:53)  T(F): 97.7 (30 Sep 2019 08:53), Max: 97.7 (30 Sep 2019 08:53)  HR: 107 (30 Sep 2019 08:53) (107 - 107)  BP: 119/80 (30 Sep 2019 08:53) (119/80 - 119/80)  BP(mean): --  RR: 18 (30 Sep 2019 08:53) (18 - 18)  SpO2: 99% (30 Sep 2019 08:53) (99% - 99%)    GENERAL:   [ ]Alert  [ ]Oriented    [ x ]Lethargic  [ ]Cachexia  [  ]Unarousable  [ ]Verbal  [  ]Minimally-Verbal  Behavioral:   [ ] Anxiety  [x ] Delirium [ ] Agitation [ ] Other  HEENT: Patient is hard of hearing  [ ]Normal   [x ]Dry mouth   [ ]ET Tube/Trach  [ ]Oral lesions [x] Oropharyngeal secretions.   PULMONARY: audible cough   [ ]Clear [ ]Tachypnea  [ ]Audible excessive secretions   [ ]Rhonchi        []Right [ ]Left [x]Bilateral  [ ]Crackles        [ ]Right [ ]Left [ ]Bilateral  [ ]Wheezing     [ ]Right [ ]Left [ ]Bilateral  [x] Sounds transmitted from Upper airway.   CARDIOVASCULAR:    [ ]Regular [ ]Irregular [x]Tachy  [ ]Sukhjinder [ ]Murmur [ ]Other +S1 +S2   GASTROINTESTINAL:  [x]Soft  [ ]Distended   [x]+BS  [ ]Non tender [ ]Tender  [ ]PEG [ ]OGT/ NGT   Last BM:   9/23  GENITOURINARY:  [ ]Normal [x ] Incontinent   [ ]Oliguria/Anuria   [x]Ayala; chronic ayala (exchanged on admission)  MUSCULOSKELETAL:   [ ]Normal   [x]Weakness  [ ]Bed/Wheelchair bound [x]Edema +2  NEUROLOGIC:   [ ]No focal deficits  [ ] Cognitive impairment  [x] Dysphagia [ ]Dysarthria [ ] Paresis [ x]Other: Lethargic.    SKIN:   [ ]Normal   [ ]Pressure ulcer(s)  [ ]Rash [x] please refer RN documentation for further details    CRITICAL CARE:  [ ] Shock Present  [ ]Septic [ ]Cardiogenic [ ]Neurologic [ ]Hypovolemic  [ ]  Vasopressors [ ]  Inotropes   [ ] Respiratory failure present [ ] Mechanical Ventilation [ ] Non-invasive ventilatory support [ ] High-Flow  [ ] Acute  [ ] Chronic [ ] Hypoxic  [ ] Hypercarbic [ ] Other  [ ] Other organ failure     LABS:  None new    RADIOLOGY & ADDITIONAL STUDIES:  None new    PROTEIN CALORIE MALNUTRITION:   [ x ] PPSV2 < or = 30% [ ] significant weight loss [ ] poor nutritional intake [ ] anasarca [ ] catabolic state   Albumin, Serum: 2.2 g/dL (09-24-19 @ 18:54)   Artificial Nutrition [ ]     REFERRALS:   [ ]Chaplaincy  [ ] Hospice  [ ]Child Life  [ ]Social Work  [ ]Case management [ ]Holistic Therapy [ ] Physical Therapy [ ] Dietary     Goals of Care Document:

## 2019-09-30 NOTE — PROGRESS NOTE ADULT - PROBLEM SELECTOR PLAN 3
Patient noted with new onset a-fib w/ RVR. CHADVASc: 2 (for age > 75).   EKG w/ SVT @ 154 bpm (QTc 448) from 9/27/19. Patient was on Cardizem; however, it was stopped due to poor oral tolerance. No AC due to prognosis and risks.   Echo from 12/18 displayed moderate mitral regurgitation and severe LV dysfunction.     -D/C Cardizem 30 mg po q8hr ATC as per patient not taking PO

## 2019-09-30 NOTE — PROGRESS NOTE ADULT - PROBLEM SELECTOR PLAN 8
Patient is already DNR/I   Plan of care was d/w his son, Jameson, that understands the patients bad prognosis and current plan of care.

## 2019-10-01 PROCEDURE — 99233 SBSQ HOSP IP/OBS HIGH 50: CPT

## 2019-10-01 RX ADMIN — PIPERACILLIN AND TAZOBACTAM 25 GRAM(S): 4; .5 INJECTION, POWDER, LYOPHILIZED, FOR SOLUTION INTRAVENOUS at 10:13

## 2019-10-01 RX ADMIN — HYDROMORPHONE HYDROCHLORIDE 0.4 MILLIGRAM(S): 2 INJECTION INTRAMUSCULAR; INTRAVENOUS; SUBCUTANEOUS at 07:35

## 2019-10-01 RX ADMIN — Medication 0.5 MILLIGRAM(S): at 11:58

## 2019-10-01 RX ADMIN — HYDROMORPHONE HYDROCHLORIDE 0.2 MILLIGRAM(S): 2 INJECTION INTRAMUSCULAR; INTRAVENOUS; SUBCUTANEOUS at 11:50

## 2019-10-01 RX ADMIN — ROBINUL 0.4 MILLIGRAM(S): 0.2 INJECTION INTRAMUSCULAR; INTRAVENOUS at 02:13

## 2019-10-01 RX ADMIN — SCOPALAMINE 1 PATCH: 1 PATCH, EXTENDED RELEASE TRANSDERMAL at 19:18

## 2019-10-01 RX ADMIN — HEPARIN SODIUM 5000 UNIT(S): 5000 INJECTION INTRAVENOUS; SUBCUTANEOUS at 06:11

## 2019-10-01 RX ADMIN — PIPERACILLIN AND TAZOBACTAM 25 GRAM(S): 4; .5 INJECTION, POWDER, LYOPHILIZED, FOR SOLUTION INTRAVENOUS at 02:13

## 2019-10-01 RX ADMIN — HYDROMORPHONE HYDROCHLORIDE 0.2 MILLIGRAM(S): 2 INJECTION INTRAMUSCULAR; INTRAVENOUS; SUBCUTANEOUS at 06:12

## 2019-10-01 RX ADMIN — ROBINUL 0.4 MILLIGRAM(S): 0.2 INJECTION INTRAMUSCULAR; INTRAVENOUS at 10:13

## 2019-10-01 RX ADMIN — HYDROMORPHONE HYDROCHLORIDE 0.2 MILLIGRAM(S): 2 INJECTION INTRAMUSCULAR; INTRAVENOUS; SUBCUTANEOUS at 18:40

## 2019-10-01 RX ADMIN — ROBINUL 0.4 MILLIGRAM(S): 0.2 INJECTION INTRAMUSCULAR; INTRAVENOUS at 14:13

## 2019-10-01 RX ADMIN — HYDROMORPHONE HYDROCHLORIDE 0.2 MILLIGRAM(S): 2 INJECTION INTRAMUSCULAR; INTRAVENOUS; SUBCUTANEOUS at 00:24

## 2019-10-01 RX ADMIN — ROBINUL 0.4 MILLIGRAM(S): 0.2 INJECTION INTRAMUSCULAR; INTRAVENOUS at 06:11

## 2019-10-01 RX ADMIN — SCOPALAMINE 1 PATCH: 1 PATCH, EXTENDED RELEASE TRANSDERMAL at 07:42

## 2019-10-01 RX ADMIN — ROBINUL 0.4 MILLIGRAM(S): 0.2 INJECTION INTRAMUSCULAR; INTRAVENOUS at 18:39

## 2019-10-01 RX ADMIN — ROBINUL 0.4 MILLIGRAM(S): 0.2 INJECTION INTRAMUSCULAR; INTRAVENOUS at 21:19

## 2019-10-01 RX ADMIN — HEPARIN SODIUM 5000 UNIT(S): 5000 INJECTION INTRAVENOUS; SUBCUTANEOUS at 18:39

## 2019-10-01 NOTE — PROGRESS NOTE ADULT - PROBLEM SELECTOR PLAN 7
Not pursuing DMT.   Son reports they were told this was the reason for patient's dysphagia and inability to tolerate certain foods.  c/w full supportive care
Not pursuing DMT. Son reports they were told this was the reason for patient's dysphagia and inability to tolerate certain foods.
Not pursuing DMT.
Not pursuing DMT. Son reports they were told this was the reason for patient's dysphagia and inability to tolerate certain foods.
Current Cr. (1.77). CrCl <30. Has h/o obstructive uropathy requiring chronic ayala.

## 2019-10-01 NOTE — PROGRESS NOTE ADULT - PROBLEM SELECTOR PLAN 8
Patient is DNR/I   Pts son Jameson understands the patients poor prognosis. c/w current plan of care.

## 2019-10-01 NOTE — PROGRESS NOTE ADULT - PROBLEM SELECTOR PROBLEM 8
Palliative care encounter
CKD (chronic kidney disease) stage 4, GFR 15-29 ml/min
Palliative care encounter
Palliative care encounter

## 2019-10-01 NOTE — PROGRESS NOTE ADULT - PROBLEM SELECTOR PROBLEM 7
Malignant neoplasm of stomach, unspecified location
CKD (chronic kidney disease) stage 4, GFR 15-29 ml/min
Malignant neoplasm of stomach, unspecified location
Malignant neoplasm of stomach, unspecified location

## 2019-10-01 NOTE — PROGRESS NOTE ADULT - SUBJECTIVE AND OBJECTIVE BOX
GAP TEAM PALLIATIVE CARE UNIT PROGRESS NOTE:      [ x ] Patient on hospice program.    INDICATION FOR PALLIATIVE CARE UNIT SERVICES:  Patient with advanced gastric ca. In PCU for symptom management including dyspnea, encephalopathy, oropharyngeal secretions. Furthermore on IV antibiotics for UTI vs possible aspiration pneumonia.     INTERVAL HPI/OVERNIGHT EVENTS:  Pt on atc Glyco 0.4 mg IV q6h and scop patch for secretion.  symptoms improved but still with audible secretions. Pt required 1 dose of Dilaudid 0.4 mg ivp  for SOB. He was lethargic but intermittently alert and able to f/u simple commands.      DNR on chart: Yes    Allergies  No Known Allergies    Intolerances    MEDICATIONS  (STANDING):  glycopyrrolate Injectable 0.4 milliGRAM(s) IV Push every 4 hours  heparin  Injectable 5000 Unit(s) SubCutaneous two times a day  HYDROmorphone  Injectable 0.2 milliGRAM(s) IV Push every 6 hours  scopolamine   Patch 1 Patch Transdermal every 72 hours    MEDICATIONS  (PRN):  acetaminophen  Suppository .. 650 milliGRAM(s) Rectal every 6 hours PRN Temp greater or equal to 38C (100.4F)  bisacodyl Suppository 10 milliGRAM(s) Rectal daily PRN Constipation  HYDROmorphone  Injectable 0.4 milliGRAM(s) IV Push every 1 hour PRN dyspnea  HYDROmorphone  Injectable 0.4 milliGRAM(s) IV Push every 1 hour PRN Moderate Pain (4 - 6)  LORazepam   Injectable 0.5 milliGRAM(s) IV Push every 1 hour PRN Agitation  prochlorperazine   Injectable 10 milliGRAM(s) IV Push every 12 hours PRN N/V    ITEMS UNCHECKED ARE NOT PRESENT    PRESENT SYMPTOMS: [  x]Unable to obtain due to poor mentation   Source if other than patient:  [ ]Family   [ ]Team     Pain: [ ] yes [ x ] no  QOL impact -   Location -                    Aggravating factors -  Quality -  Radiation -  Timing-  Severity (0-10 scale):  Minimal acceptable level (0-10 scale):     Dyspnea:                           [ ]Mild [ ]Moderate [ ]Severe  Anxiety:                             [ ]Mild [ ]Moderate [ ]Severe  Fatigue:                             [ ]Mild [ ]Moderate [x ]Severe  Nausea:                            [ ]Mild [ ]Moderate [ ]Severe  Loss of appetite:               [ ]Mild [ ]Moderate [ ]Severe  Constipation:                    [ ]Mild [ ]Moderate [ ]Severe    PAINAD Score: 0    [email or web address] (Ctrl +  left click to view)  		  Other Symptoms:  [ ]All other review of systems negative     Karnofsky Performance Score/Palliative Performance Status Version 2: 30%      [email or web address]://[email or web address]/[email or web address]_performance_[email or web address]_[email or web address]    PHYSICAL EXAM:  Vital Signs Last 24 Hrs  T(C): 36.7 (01 Oct 2019 08:13), Max: 36.7 (01 Oct 2019 08:13)  T(F): 98 (01 Oct 2019 08:13), Max: 98 (01 Oct 2019 08:13)  HR: 111 (01 Oct 2019 08:13) (111 - 111)  BP: 100/75 (01 Oct 2019 08:13) (100/75 - 100/75)  BP(mean): --  RR: 18 (01 Oct 2019 08:13) (18 - 18)  SpO2: 99% (01 Oct 2019 08:13) (99% - 99%)    GENERAL:   [ ]Alert  [ ]Oriented    [ x ]Lethargic  [ ]Cachexia  [  ]Unarousable  [ ]Verbal  [  ]Minimally-Verbal  Behavioral:   [ ] Anxiety  [x ] Delirium [ ] Agitation [ ] Other  HEENT: Patient is hard of hearing  [ ]Normal   [x ]Dry mouth   [ ]ET Tube/Trach  [ ]Oral lesions [x] Oropharyngeal secretions.   PULMONARY: audible cough   [ ]Clear [ ]Tachypnea  [ ]Audible excessive secretions   [ ]Rhonchi        []Right [ ]Left [x]Bilateral  [ ]Crackles        [ ]Right [ ]Left [ ]Bilateral  [ ]Wheezing     [ ]Right [ ]Left [ ]Bilateral  [x] Sounds transmitted from Upper airway.   CARDIOVASCULAR:    [ ]Regular [ ]Irregular [x]Tachy  [ ]Sukhjinder [ ]Murmur [ ]Other +S1 +S2   GASTROINTESTINAL:  [x]Soft  [ ]Distended   [x]+BS  [ ]Non tender [ ]Tender  [ ]PEG [ ]OGT/ NGT   Last BM:   9/23  GENITOURINARY:  [ ]Normal [x ] Incontinent   [ ]Oliguria/Anuria   [x]Ayala; chronic ayala (exchanged on admission)  MUSCULOSKELETAL:   [ ]Normal   [x]Weakness  [ ]Bed/Wheelchair bound [x]Edema +2  NEUROLOGIC:   [ ]No focal deficits  [ ] Cognitive impairment  [x] Dysphagia [ ]Dysarthria [ ] Paresis [ x]Other: Lethargic.    SKIN:   [ ]Normal   [ ]Pressure ulcer(s)  [ ]Rash [x] please refer RN documentation for further details    CRITICAL CARE:  [ ] Shock Present  [ ] Septic [ ]Cardiogenic [ ]Neurologic [ ]Hypovolemic  [ ]  Vasopressors [ ]  Inotropes   [ ] Respiratory failure present [ ] Mechanical Ventilation [ ] Non-invasive ventilatory support [ ] High-Flow  [ ] Acute  [ ] Chronic [ ] Hypoxic  [ ] Hypercarbic [ ] Other  [ ] Other organ failure     LABS:  None new    RADIOLOGY & ADDITIONAL STUDIES:  None new    PROTEIN CALORIE MALNUTRITION:   [ x ] PPSV2 < or = 30% [ ] significant weight loss [ ] poor nutritional intake [ ] anasarca [ ] catabolic state   Albumin, Serum: 2.2 g/dL (09-24-19 @ 18:54)   Artificial Nutrition [ ]     REFERRALS:   [ ]Chaplaincy  [ ] Hospice  [ ]Child Life  [ ]Social Work  [ ]Case management [ ]Holistic Therapy [ ] Physical Therapy [ ] Dietary     Goals of Care Document:

## 2019-10-02 VITALS — SYSTOLIC BLOOD PRESSURE: 66 MMHG | DIASTOLIC BLOOD PRESSURE: 52 MMHG | RESPIRATION RATE: 16 BRPM | TEMPERATURE: 97 F

## 2019-10-02 DIAGNOSIS — R09.89 OTHER SPECIFIED SYMPTOMS AND SIGNS INVOLVING THE CIRCULATORY AND RESPIRATORY SYSTEMS: ICD-10-CM

## 2019-10-02 PROCEDURE — 84132 ASSAY OF SERUM POTASSIUM: CPT

## 2019-10-02 PROCEDURE — 96366 THER/PROPH/DIAG IV INF ADDON: CPT

## 2019-10-02 PROCEDURE — 83605 ASSAY OF LACTIC ACID: CPT

## 2019-10-02 PROCEDURE — 99233 SBSQ HOSP IP/OBS HIGH 50: CPT | Mod: GC

## 2019-10-02 PROCEDURE — 94640 AIRWAY INHALATION TREATMENT: CPT

## 2019-10-02 PROCEDURE — 85730 THROMBOPLASTIN TIME PARTIAL: CPT

## 2019-10-02 PROCEDURE — 96365 THER/PROPH/DIAG IV INF INIT: CPT

## 2019-10-02 PROCEDURE — 97162 PT EVAL MOD COMPLEX 30 MIN: CPT

## 2019-10-02 PROCEDURE — 87086 URINE CULTURE/COLONY COUNT: CPT

## 2019-10-02 PROCEDURE — 80048 BASIC METABOLIC PNL TOTAL CA: CPT

## 2019-10-02 PROCEDURE — 81001 URINALYSIS AUTO W/SCOPE: CPT

## 2019-10-02 PROCEDURE — 93005 ELECTROCARDIOGRAM TRACING: CPT

## 2019-10-02 PROCEDURE — 71045 X-RAY EXAM CHEST 1 VIEW: CPT

## 2019-10-02 PROCEDURE — 82435 ASSAY OF BLOOD CHLORIDE: CPT

## 2019-10-02 PROCEDURE — 82803 BLOOD GASES ANY COMBINATION: CPT

## 2019-10-02 PROCEDURE — 80053 COMPREHEN METABOLIC PANEL: CPT

## 2019-10-02 PROCEDURE — 99285 EMERGENCY DEPT VISIT HI MDM: CPT | Mod: 25

## 2019-10-02 PROCEDURE — 85027 COMPLETE CBC AUTOMATED: CPT

## 2019-10-02 PROCEDURE — 85014 HEMATOCRIT: CPT

## 2019-10-02 PROCEDURE — 82947 ASSAY GLUCOSE BLOOD QUANT: CPT

## 2019-10-02 PROCEDURE — 84295 ASSAY OF SERUM SODIUM: CPT

## 2019-10-02 PROCEDURE — 87449 NOS EACH ORGANISM AG IA: CPT

## 2019-10-02 PROCEDURE — 85610 PROTHROMBIN TIME: CPT

## 2019-10-02 PROCEDURE — 82330 ASSAY OF CALCIUM: CPT

## 2019-10-02 PROCEDURE — 96367 TX/PROPH/DG ADDL SEQ IV INF: CPT

## 2019-10-02 PROCEDURE — 87040 BLOOD CULTURE FOR BACTERIA: CPT

## 2019-10-02 RX ADMIN — HYDROMORPHONE HYDROCHLORIDE 0.2 MILLIGRAM(S): 2 INJECTION INTRAMUSCULAR; INTRAVENOUS; SUBCUTANEOUS at 05:08

## 2019-10-02 RX ADMIN — SCOPALAMINE 1 PATCH: 1 PATCH, EXTENDED RELEASE TRANSDERMAL at 07:20

## 2019-10-02 RX ADMIN — ROBINUL 0.4 MILLIGRAM(S): 0.2 INJECTION INTRAMUSCULAR; INTRAVENOUS at 05:07

## 2019-10-02 RX ADMIN — HYDROMORPHONE HYDROCHLORIDE 0.2 MILLIGRAM(S): 2 INJECTION INTRAMUSCULAR; INTRAVENOUS; SUBCUTANEOUS at 00:14

## 2019-10-02 RX ADMIN — ROBINUL 0.4 MILLIGRAM(S): 0.2 INJECTION INTRAMUSCULAR; INTRAVENOUS at 01:05

## 2019-10-02 RX ADMIN — Medication 0.5 MILLIGRAM(S): at 08:47

## 2019-10-02 RX ADMIN — HEPARIN SODIUM 5000 UNIT(S): 5000 INJECTION INTRAVENOUS; SUBCUTANEOUS at 05:07

## 2019-10-02 RX ADMIN — HYDROMORPHONE HYDROCHLORIDE 0.4 MILLIGRAM(S): 2 INJECTION INTRAMUSCULAR; INTRAVENOUS; SUBCUTANEOUS at 10:47

## 2019-10-02 NOTE — PROGRESS NOTE ADULT - REASON FOR ADMISSION
confusion, coughing

## 2019-10-02 NOTE — PROGRESS NOTE ADULT - SUBJECTIVE AND OBJECTIVE BOX
GAP TEAM PALLIATIVE CARE UNIT PROGRESS NOTE:      [ x ] Patient on hospice program.    INDICATION FOR PALLIATIVE CARE UNIT SERVICES:  History of recurrent aspiration pneumonia and advanced gastric cancer. In PCU for symptom management including dyspnea, encephalopathy, oropharyngeal secretions.     INTERVAL HPI/OVERNIGHT EVENTS:  Patient unresponsive and in the active dying process this AM. Agonal respirations followed by periods of tachypnea. Son at bedside. PRN Ativan and prn Dilaudid x 1 in past 24 hours. Remains on ATC Robinul and scopolamine patch in place.     DNR on chart: Yes    Allergies  No Known Allergies    Intolerances    MEDICATIONS  (STANDING):  glycopyrrolate Injectable 0.4 milliGRAM(s) IV Push every 4 hours  heparin  Injectable 5000 Unit(s) SubCutaneous two times a day  HYDROmorphone  Injectable 0.2 milliGRAM(s) IV Push every 6 hours  scopolamine   Patch 1 Patch Transdermal every 72 hours    MEDICATIONS  (PRN):  acetaminophen  Suppository .. 650 milliGRAM(s) Rectal every 6 hours PRN Temp greater or equal to 38C (100.4F)  bisacodyl Suppository 10 milliGRAM(s) Rectal daily PRN Constipation  HYDROmorphone  Injectable 0.4 milliGRAM(s) IV Push every 1 hour PRN dyspnea  HYDROmorphone  Injectable 0.4 milliGRAM(s) IV Push every 1 hour PRN Moderate Pain (4 - 6)  LORazepam   Injectable 0.5 milliGRAM(s) IV Push every 1 hour PRN Agitation  prochlorperazine   Injectable 10 milliGRAM(s) IV Push every 12 hours PRN N/V    ITEMS UNCHECKED ARE NOT PRESENT    PRESENT SYMPTOMS: [  x]Unable to obtain due to poor mentation   Source if other than patient:  [ ]Family   [ ]Team     Pain: [ ] yes [ x ] no  QOL impact -   Location -                    Aggravating factors -  Quality -  Radiation -  Timing-  Severity (0-10 scale):  Minimal acceptable level (0-10 scale):     Dyspnea:                           [ ]Mild [ ]Moderate [ ]Severe  Anxiety:                             [ ]Mild [ ]Moderate [ ]Severe  Fatigue:                             [ ]Mild [ ]Moderate [x ]Severe  Nausea:                            [ ]Mild [ ]Moderate [ ]Severe  Loss of appetite:               [ ]Mild [ ]Moderate [ ]Severe  Constipation:                    [ ]Mild [ ]Moderate [ ]Severe    PAINAD Score: 0    [email or web address] (Ctrl +  left click to view)  		  Other Symptoms:  [ ]All other review of systems negative     Karnofsky Performance Score/Palliative Performance Status Version 2: 10%      [email or web address]/[email or web address]_[email or web address]    PHYSICAL EXAM:  Vital Signs Last 24 Hrs  T(C): 36.3 (02 Oct 2019 08:39), Max: 36.3 (02 Oct 2019 08:39)  T(F): 97.4 (02 Oct 2019 08:39), Max: 97.4 (02 Oct 2019 08:39)  HR: --  BP: 66/52 (02 Oct 2019 08:39) (66/52 - 66/52)  BP(mean): --  RR: 16 (02 Oct 2019 08:39) (16 - 16)  SpO2: --    GENERAL:   [ ]Alert  [ ]Oriented    [  ]Lethargic  [ ]Cachexia  [x  ]Unarousable  [ ]Verbal  [  ]Minimally-Verbal  Behavioral:   [ ] Anxiety  [ ] Delirium [ ] Agitation [ ] Other  HEENT: Patient is hard of hearing  [ ]Normal   [x ]Dry mouth   [ ]ET Tube/Trach  [ ]Oral lesions [x] Oropharyngeal secretions.   PULMONARY: audible cough   [ ]Clear [ ]Tachypnea  [ ]Audible excessive secretions   [ ]Rhonchi        []Right [ ]Left [x]Bilateral  [ ]Crackles        [ ]Right [ ]Left [ ]Bilateral  [ ]Wheezing     [ ]Right [ ]Left [ ]Bilateral  [x] Sounds transmitted from Upper airway.   CARDIOVASCULAR:    [ ]Regular [ ]Irregular [x]Tachy  [ ]Sukhjinder [ ]Murmur [ ]Other +S1 +S2   GASTROINTESTINAL:  [x]Soft  [ ]Distended   [x]+BS  [ ]Non tender [ ]Tender  [ ]PEG [ ]OGT/ NGT   Last BM:   9/27/19  GENITOURINARY:  [ ]Normal [x ] Incontinent   [ ]Oliguria/Anuria   [x]Ayala; chronic ayala (exchanged on admission)  MUSCULOSKELETAL:   [ ]Normal   [x]Weakness  [ ]Bed/Wheelchair bound [x]Edema +2  NEUROLOGIC:   [ ]No focal deficits  [ ] Cognitive impairment  [x] Dysphagia [ ]Dysarthria [ ] Paresis [ x]Other: Lethargic.    SKIN:   [ ]Normal   [ ]Pressure ulcer(s)  [ ]Rash [x] please refer RN documentation for further details    CRITICAL CARE:  [ ] Shock Present  [ ] Septic [ ]Cardiogenic [ ]Neurologic [ ]Hypovolemic  [ ]  Vasopressors [ ]  Inotropes   [ ] Respiratory failure present [ ] Mechanical Ventilation [ ] Non-invasive ventilatory support [ ] High-Flow  [x ] Acute  [ x] Chronic [ ] Hypoxic  [ ] Hypercarbic [ ] Other  [ ] Other organ failure     LABS:  None new    RADIOLOGY & ADDITIONAL STUDIES:  None new    PROTEIN CALORIE MALNUTRITION:   [ x ] PPSV2 < or = 30% [ ] significant weight loss [ ] poor nutritional intake [ ] anasarca [ ] catabolic state   Albumin, Serum: 2.2 g/dL (09-24-19 @ 18:54)   Artificial Nutrition [ ]     REFERRALS:   [ ]Chaplaincy  [ ] Hospice  [ ]Child Life  [ ]Social Work  [ ]Case management [ ]Holistic Therapy [ ] Physical Therapy [ ] Dietary     Goals of Care Document: GAP TEAM PALLIATIVE CARE UNIT PROGRESS NOTE:      [ x ] Patient on hospice program.    INDICATION FOR PALLIATIVE CARE UNIT SERVICES:  History of recurrent aspiration pneumonia and advanced gastric cancer. In PCU for symptom management including dyspnea, encephalopathy, oropharyngeal secretions.     INTERVAL HPI/OVERNIGHT EVENTS:  Patient unresponsive and in the active dying process this AM. Agonal respirations followed by periods of tachypnea. Son at bedside. PRN Ativan and prn Dilaudid x 1 in past 24 hours. Remains on ATC Robinul and scopolamine patch in place.     DNR on chart: Yes    Allergies  No Known Allergies    Intolerances    MEDICATIONS  (STANDING):  glycopyrrolate Injectable 0.4 milliGRAM(s) IV Push every 4 hours  heparin  Injectable 5000 Unit(s) SubCutaneous two times a day  HYDROmorphone  Injectable 0.2 milliGRAM(s) IV Push every 6 hours  scopolamine   Patch 1 Patch Transdermal every 72 hours    MEDICATIONS  (PRN):  acetaminophen  Suppository .. 650 milliGRAM(s) Rectal every 6 hours PRN Temp greater or equal to 38C (100.4F)  bisacodyl Suppository 10 milliGRAM(s) Rectal daily PRN Constipation  HYDROmorphone  Injectable 0.4 milliGRAM(s) IV Push every 1 hour PRN dyspnea  HYDROmorphone  Injectable 0.4 milliGRAM(s) IV Push every 1 hour PRN Moderate Pain (4 - 6)  LORazepam   Injectable 0.5 milliGRAM(s) IV Push every 1 hour PRN Agitation  prochlorperazine   Injectable 10 milliGRAM(s) IV Push every 12 hours PRN N/V    ITEMS UNCHECKED ARE NOT PRESENT    PRESENT SYMPTOMS: [  x]Unable to obtain due to poor mentation   Source if other than patient:  [ ]Family   [ ]Team     Pain: [ ] yes [ x ] no  QOL impact -   Location -                    Aggravating factors -  Quality -  Radiation -  Timing-  Severity (0-10 scale):  Minimal acceptable level (0-10 scale):     Dyspnea:                           [ ]Mild [ ]Moderate [ ]Severe  Anxiety:                             [ ]Mild [ ]Moderate [ ]Severe  Fatigue:                             [ ]Mild [ ]Moderate [x ]Severe  Nausea:                            [ ]Mild [ ]Moderate [ ]Severe  Loss of appetite:               [ ]Mild [ ]Moderate [ ]Severe  Constipation:                    [ ]Mild [ ]Moderate [ ]Severe    PAINAD Score: 0    [email or web address] (Ctrl +  left click to view)  		  Other Symptoms:  [ ]All other review of systems negative     Karnofsky Performance Score/Palliative Performance Status Version 2: 10%      [email or web address]/[email or web address]_[email or web address]    PHYSICAL EXAM:  Vital Signs Last 24 Hrs  T(C): 36.3 (02 Oct 2019 08:39), Max: 36.3 (02 Oct 2019 08:39)  T(F): 97.4 (02 Oct 2019 08:39), Max: 97.4 (02 Oct 2019 08:39)  HR: --  BP: 66/52 (02 Oct 2019 08:39) (66/52 - 66/52)  BP(mean): --  RR: 16 (02 Oct 2019 08:39) (16 - 16)  SpO2: --    GENERAL:   [ ]Alert  [ ]Oriented    [  ]Lethargic  [ ]Cachexia  [x  ]Unarousable  [ ]Verbal  [  ]Minimally-Verbal  Behavioral:   [ ] Anxiety  [ ] Delirium [ ] Agitation [ ] Other  HEENT: Patient is hard of hearing  [ ]Normal   [x ]Dry mouth   [ ]ET Tube/Trach  [ ]Oral lesions [x] Oropharyngeal secretions.   PULMONARY: audible cough   [ ]Clear [ ]Tachypnea  [ ]Audible excessive secretions   [ ]Rhonchi        []Right [ ]Left [x]Bilateral  [ ]Crackles        [ ]Right [ ]Left [ ]Bilateral  [ ]Wheezing     [ ]Right [ ]Left [ ]Bilateral  [x] Sounds transmitted from Upper airway.   [x] Labored breathing.   CARDIOVASCULAR:    [ ]Regular [ ]Irregular [x]Tachy  [ ]Sukhjinder [ ]Murmur [ ]Other +S1 +S2   GASTROINTESTINAL:  [x]Soft  [ ]Distended   [x]+BS  [ ]Non tender [ ]Tender  [ ]PEG [ ]OGT/ NGT   Last BM:   9/27/19  GENITOURINARY:  [ ]Normal [x ] Incontinent   [ ]Oliguria/Anuria   [x]Ayala; chronic ayala (exchanged on admission)  MUSCULOSKELETAL:   [ ]Normal   [x]Weakness  [ ]Bed/Wheelchair bound [x]Edema +2  NEUROLOGIC:   [ ]No focal deficits  [ ] Cognitive impairment  [x] Dysphagia [ ]Dysarthria [ ] Paresis [ x]Other: Lethargic.    SKIN:   [ ]Normal   [ ]Pressure ulcer(s)  [ ]Rash [x] please refer RN documentation for further details    CRITICAL CARE:  [ ] Shock Present  [ ] Septic [ ]Cardiogenic [ ]Neurologic [ ]Hypovolemic  [ ]  Vasopressors [ ]  Inotropes   [ ] Respiratory failure present [ ] Mechanical Ventilation [ ] Non-invasive ventilatory support [ ] High-Flow  [x ] Acute  [ x] Chronic [ ] Hypoxic  [ ] Hypercarbic [ ] Other  [ ] Other organ failure     LABS:  None new    RADIOLOGY & ADDITIONAL STUDIES:  None new    PROTEIN CALORIE MALNUTRITION:   [ x ] PPSV2 < or = 30% [ ] significant weight loss [ ] poor nutritional intake [ ] anasarca [ ] catabolic state   Albumin, Serum: 2.2 g/dL (09-24-19 @ 18:54)   Artificial Nutrition [ ]     REFERRALS:   [ ]Chaplaincy  [ ] Hospice  [ ]Child Life  [ ]Social Work  [ ]Case management [ ]Holistic Therapy [ ] Physical Therapy [ ] Dietary     Goals of Care Document:

## 2019-10-02 NOTE — PROGRESS NOTE ADULT - PROBLEM SELECTOR PROBLEM 3
Tachycardia
Shortness of breath
Gurgling breath sounds
Shortness of breath
Encephalopathy
Tachycardia

## 2019-10-02 NOTE — DISCHARGE NOTE FOR THE EXPIRED PATIENT - HOSPITAL COURSE
87 y/o bilingual (German/English) speaking male was on home hospice ([facility]) w/ PMHx of advanced gastric cancer (not on chemo), CKD, obstructive uropathy (chronic ayala since 2018), hypothyroidism, anemia, and dysphagia p/w respiratory distress (home CXR c/w pneumonia) and altered mental status. Upon admission to the hospital on 19 work up revealed possible pneumonia, UTI (chronic ayala), hypotension and new onset a-fib w/ RVR. Decision was made on admission to forego telemetry monitoring for a-fib RVR. While in ED, it was proceeded with treating possible aspiration pneumonia and UTI; received Zithromax 500 mg x1, Rocephin 1 gm IV x1, and was started on Zosyn 3.375 mg IV q8hr.     Patient was transferred to the PCU for continuation of care. Patient completed a 7 day course of antibiotics, however, his mental status and respiratory status declined. Full comfort care was provided and patient was treated with prn Dilaudid and Ativan for management of dyspnea and agitation. Patient  in the PCU in the late morning on 10/2/19. 85 y/o bilingual (Sinhala/English) speaking male was on home hospice ([facility]) w/ PMHx of advanced gastric cancer (not on chemo), CKD, obstructive uropathy (chronic ayala since 2018), hypothyroidism, anemia, and dysphagia p/w respiratory distress (home CXR c/w pneumonia) and altered mental status. Upon admission to the hospital on 19 work up revealed possible pneumonia, UTI (chronic ayala), hypotension and new onset a-fib w/ RVR. Decision was made on admission to forego telemetry monitoring for a-fib RVR. While in ED, it was proceeded with treating possible aspiration pneumonia and UTI; received Zithromax 500 mg x1, Rocephin 1 gm IV x1, and was started on Zosyn 3.375 mg IV q8hr.     Patient was transferred to the PCU for continuation of care. Patient completed a 7 day course of antibiotics, however, his mental status and respiratory status declined. Full comfort care was provided and patient was treated with prn Dilaudid and Ativan for management of dyspnea and agitation. Patient  in the PCU in the late morning on 10/2/19.

## 2019-10-02 NOTE — PROGRESS NOTE ADULT - ASSESSMENT
85 y/o bilingual (Kinyarwanda/English) speaking M, on home hospice (HCN) w/ PMHx of advanced gastric cancer (not on chemo), CKD, obstructive uropathy (chronic ayala since 12/2018), hypothyroidism, anemia, and dysphagia p/w respiratory distress (home CXR c/w pneumonia) and altered mental status. Upon admission work up revealed possible pneumonia, UTI (chronic ayala), hypotension and new onset a-fib w/ RVR. Decision was made to forego telemetry monitoring for a-fib RVR. While in ED, proceeded with treating possible aspiration pneumonia and UTI; received Zithromax 500 mg x1, Rocephin 1 gm IV x1, and was started on Zosyn 3.375 mg IV q8hr. Patient's vital signs and mental status have improved since admission. While in PCU patient will continue to to receive IV antibiotics and symptomatic treatment for distressing symptoms.     Molst documentation placed in chart. Patient is DNR/DNI, comfort measures, no feeding tubes.
85 y/o bilingual (German/English) speaking M, on home hospice (HCN) w/ PMHx of advanced gastric cancer (not on chemo), CKD, obstructive uropathy (chronic ayala since 12/2018), hypothyroidism, anemia, and dysphagia p/w respiratory distress (home CXR c/w pneumonia) and altered mental status. Upon admission work up revealed possible pneumonia, UTI (chronic ayala), hypotension and new onset a-fib w/ RVR. Decision was made to forego telemetry monitoring for a-fib RVR. While in ED, proceeded with treating possible aspiration pneumonia and UTI; received Zithromax 500 mg x1, Rocephin 1 gm IV x1, and was started on Zosyn 3.375 mg IV q8hr. Patient's vital signs and mental status have improved since admission. While in PCU patient will continue to to receive IV antibiotics and symptomatic treatment for distressing symptoms.
85 y/o bilingual (Belarusian/English) speaking M, on home hospice (HCN) w/ PMHx of advanced gastric cancer (not on chemo), CKD, obstructive uropathy (chronic ayala since 12/2018), hypothyroidism, anemia, and dysphagia p/w respiratory distress (home CXR c/w pneumonia) and altered mental status. Upon admission work up revealed possible pneumonia, UTI (chronic ayala), hypotension and new onset a-fib w/ RVR. Decision was made to forego telemetry monitoring for a-fib RVR. While in ED, proceeded with treating possible aspiration pneumonia and UTI; received Zithromax 500 mg x1, Rocephin 1 gm IV x1, and was started on Zosyn 3.375 mg IV q8hr. Patient's vital signs and mental status have improved since admission. While in PCU patient will continue to to receive IV antibiotics and symptomatic treatment for distressing symptoms.     Patient in the active dying process this AM. Extremities edematous, cool and mottled. Tachypnea followed by periods of apnea. Ativan and Dilaudid prn x 1 in past 24 hours.
86 y/po male c hx advanced gastric cancer , on home hospice ( ?CKD, gastric ulcer, pericardial effusion s/p tap (Dec '18), obstructive uropathy s/p chronic ayala (Dec '18), dysphagia on pureed with thin liquids, hypothyroidism, anemia receiving IV iron and IV albumin infusion by heme, w/ likely PNA   ? aspiraTION    c/b acute metabolic encephalopathy,   new Afib c rvr.    :  ·  Problem: Respiratory distress  .  Plan: - 2/2 aspiration vs PNA  - aspiration precaution, , chest PT  - nebs  - O2 as needed.   ·  Problem: Aspiration pneumonia, unspecified aspiration pneumonia type, unspecified laterality, unspecified part of lung.   abs as per palliative. unit         ·  Problem: Atrial fibrillation with rapid ventricular response.    - cont dilt for rate control      ·  Problem: Acute metabolic encephalopathy.  Plan: - treat above medical issues. non focal exam.  ? CTH.        ckd?   Plan: - unknown baseline, but significantly improved compared to prior hospitalization with chronic ayala        Problem: Malignant neoplasm of cardia of stomach. Plan: - poor prognosis  onc f/u  - cont protonix.      ·  Problem: Advanced care planning/counseling discussion.    - DNR/DNI   pt placed in palliative  dr klein to assume care  i am covering dr florian.
87 y/o bilingual (German/English) speaking M, on home hospice (HCN) w/ PMHx of advanced gastric cancer (not on chemo), CKD, obstructive uropathy (chronic ayala since 12/2018), hypothyroidism, anemia, and dysphagia p/w respiratory distress (home CXR c/w pneumonia) and altered mental status. Upon admission work up revealed possible pneumonia, UTI (chronic ayala), hypotension and new onset a-fib w/ RVR. Decision was made to forego telemetry monitoring for a-fib RVR. While in ED, proceeded with treating possible aspiration pneumonia and UTI; received Zithromax 500 mg x1, Rocephin 1 gm IV x1, and was started on Zosyn 3.375 mg IV q8hr. Patient's vital signs and mental status have improved since admission. While in PCU patient will continue to to receive IV antibiotics and symptomatic treatment for distressing symptoms.     Day #4 Zosyn for aspiration pneumonia and UTI. Patient able to tolerate small amount of thickened liquids. Sleeping this AM on assessment.
85 y/o bilingual (Divehi/English) speaking M, on home hospice (HCN) w/ PMHx of advanced gastric cancer (not on chemo), CKD, obstructive uropathy (chronic ayala since 12/2018), hypothyroidism, anemia, and dysphagia p/w respiratory distress (home CXR c/w pneumonia) and altered mental status. Upon admission work up revealed possible pneumonia, UTI (chronic ayala), hypotension and new onset a-fib w/ RVR. Decision was made to forego telemetry monitoring for a-fib RVR. While in ED, proceeded with treating possible aspiration pneumonia and UTI; received Zithromax 500 mg x1, Rocephin 1 gm IV x1, and was started on Zosyn 3.375 mg IV q8hr. Patient's vital signs and mental status have improved since admission. While in PCU patient will continue to to receive IV antibiotics and symptomatic treatment for distressing symptoms.     Molst documentation placed in chart. Patient is DNR/DNI, comfort measures, no feeding tubes.
85 y/o bilingual (Armenian/English) speaking M, on home hospice (HCN) w/ PMHx of advanced gastric cancer (not on chemo), CKD, obstructive uropathy (chronic ayala since 12/2018), hypothyroidism, anemia, and dysphagia p/w respiratory distress (home CXR c/w pneumonia) and altered mental status. Upon admission work up revealed possible pneumonia, UTI (chronic ayala), hypotension and new onset a-fib w/ RVR. Decision was made to forego telemetry monitoring for a-fib RVR. While in ED, proceeded with treating possible aspiration pneumonia and UTI; received Zithromax 500 mg x1, Rocephin 1 gm IV x1, and was started on Zosyn 3.375 mg IV q8hr. Patient's vital signs and mental status have improved since admission. While in PCU patient will continue to to receive IV antibiotics and symptomatic treatment for distressing symptoms.
85 y/o bilingual (Korean/English) speaking M, on home hospice (HCN) w/ PMHx of advanced gastric cancer (not on chemo), CKD, obstructive uropathy (chronic ayala since 12/2018), hypothyroidism, anemia, and dysphagia p/w respiratory distress (home CXR c/w pneumonia) and altered mental status. Upon admission work up revealed possible pneumonia, UTI (chronic ayala), hypotension and new onset a-fib w/ RVR. Decision was made to forego telemetry monitoring for a-fib RVR. While in ED, proceeded with treating possible aspiration pneumonia and UTI; received Zithromax 500 mg x1, Rocephin 1 gm IV x1, and was started on Zosyn 3.375 mg IV q8hr. Patient's vital signs and mental status have improved since admission. While in PCU patient will continue to to receive IV antibiotics and symptomatic treatment for distressing symptoms.
87 y/o bilingual (Yi/English) speaking M, on home hospice (HCN) w/ PMHx of advanced gastric cancer (not on chemo), CKD, obstructive uropathy (chronic ayala since 12/2018), hypothyroidism, anemia, and dysphagia p/w respiratory distress (home CXR c/w pneumonia) and altered mental status. Upon admission work up revealed possible pneumonia, UTI (chronic ayala), hypotension and new onset a-fib w/ RVR. Decision was made to forego telemetry monitoring for a-fib RVR. While in ED, proceeded with treating possible aspiration pneumonia and UTI; received Zithromax 500 mg x1, Rocephin 1 gm IV x1, and was started on Zosyn 3.375 mg IV q8hr. Patient's vital signs and mental status have improved since admission. While in PCU patient will continue to to receive IV antibiotics and symptomatic treatment for distressing symptoms.

## 2019-10-02 NOTE — PROGRESS NOTE ADULT - PROBLEM SELECTOR PROBLEM 5
Urinary tract infection associated with indwelling urethral catheter, initial encounter
Urinary tract infection associated with indwelling urethral catheter, initial encounter
Pleural effusion associated with pulmonary infection
Malignant neoplasm of stomach, unspecified location
Urinary tract infection associated with indwelling urethral catheter, initial encounter
Pleural effusion associated with pulmonary infection
Urinary tract infection associated with indwelling urethral catheter, initial encounter
Urinary tract infection associated with indwelling urethral catheter, initial encounter

## 2019-10-02 NOTE — PROGRESS NOTE ADULT - PROBLEM SELECTOR PLAN 5
UA + for UTI. Patient has had a chronic ayala for obstructive uropathy since 2018. Son stated the last time the ayala catheter was changed was several days ago at home and now upon admission.  -c/w Zosyin. Urine culture from 9/25 displayed no growth.
Most likely 2/2 infection and component of hypoalbuminemia. Will treat associated symptoms of dyspnea and cough.
Not pursuing DMT.   Son reports they were told this was the reason for patient's dysphagia and inability to tolerate certain foods.  c/w full supportive care
UA + for UTI. Patient has had a chronic ayala for obstructive uropathy since 2018. Son stated the last time the ayala catheter was changed was several days ago at home.
Most likely 2/2 infection and component of hypoalbuminemia. Will treat associated symptoms of dyspnea and cough.
UA + for UTI. Patient has had a chronic ayala for obstructive uropathy since 2018. Son stated the last time the ayala catheter was changed was several days ago at home and now upon admission.  -completed zosyn course

## 2019-10-02 NOTE — PROGRESS NOTE ADULT - PROBLEM SELECTOR PROBLEM 4
Pleural effusion associated with pulmonary infection
Encephalopathy
Functional quadriplegia
Pleural effusion associated with pulmonary infection
Encephalopathy
Pleural effusion associated with pulmonary infection

## 2019-10-02 NOTE — PROGRESS NOTE ADULT - PROBLEM SELECTOR PROBLEM 2
Tachycardia
Encephalopathy
Tachycardia
Shortness of breath
Encephalopathy

## 2019-10-02 NOTE — PROVIDER CONTACT NOTE (OTHER) - ASSESSMENT
No response to external stimuli. No spontaneous respirations. No apical heart rate. Negative papillary response to light

## 2019-10-02 NOTE — PROGRESS NOTE ADULT - PROBLEM SELECTOR PLAN 6
PPS 30%, requires assistance with ADL. As per son, patient has been declining and becoming weaker at home over the past month.
PPS 30%, requires assistance with ADL. As per son, patient has been declining and becoming weaker at home over the past month.
UA + for UTI. Patient has had a chronic ayala for obstructive uropathy since 2018. Son stated the last time the ayala catheter was changed was several days ago at home and now upon admission.
PPS 30%, requires assistance with ADL. As per son, patient has been declining and becoming weaker at home over the past month.
Patient is DNR/I. Patient in the active dying process this AM. Son called and made aware and is at bedside on AM assessment.   Pts son Jameson understands the patients poor prognosis. Full comfort approach.
Not pursuing DMT.
PPS 30%, requires assistance with ADL. As per son, patient has been declining and becoming weaker at home over the past month.
UA + for UTI. Patient has had a chronic ayala for obstructive uropathy since 2018. Son stated the last time the ayala catheter was changed was several days ago at home and now upon admission.  -c/w Zosyin. Urine culture from 9/25 displayed no growth.

## 2019-10-02 NOTE — PROGRESS NOTE ADULT - PROBLEM SELECTOR PROBLEM 6
Functional quadriplegia
Debility
Urinary tract infection associated with indwelling urethral catheter, initial encounter
Functional quadriplegia
Palliative care encounter
Malignant neoplasm of stomach, unspecified location
Functional quadriplegia
Urinary tract infection associated with indwelling urethral catheter, initial encounter

## 2019-10-02 NOTE — PROGRESS NOTE ADULT - PROBLEM SELECTOR PLAN 1
Controlled.   Most likely 2/2 aspiration pneumonia 2/2 dysphagia and worsening oropharyngeal secretions, c/b b/l pleural effusions. CXR revealed bilateral pleural effusions. Underlying pneumonia cannot be ruled out. Leukocytosis on presentation.  -Patient has completed course of Zosyn.  - Patient in the active dying process.  -c/w supplemental O2 via 2 L NC  - Robinul 0.4 mg IV q 4 ATC and scopolamine patch in place  - Aspiration precautions. Controlled.   Most likely 2/2 aspiration pneumonia 2/2 dysphagia and worsening oropharyngeal secretions, c/b b/l pleural effusions. CXR revealed bilateral pleural effusions. Underlying pneumonia cannot be ruled out. Leukocytosis on presentation.  -Patient has completed course of Zosyn.  - Patient in the active dying process.  -c/w supplemental O2 via 2 L NC  - Robinul 0.4 mg IV q 4 ATC and scopolamine patch in place  - Dilaudid 0.4 mg IV q 1 PRN. Ativan 0.5 mg IV q 1 PRN for recurrent symptoms.   - Aspiration precautions.

## 2019-10-02 NOTE — DISCHARGE NOTE FOR THE EXPIRED PATIENT - SECONDARY DIAGNOSIS.
Respiratory distress Pleural effusion associated with pulmonary infection Urinary tract infection associated with indwelling urethral catheter, initial encounter Malignant neoplasm of stomach, unspecified location Debility

## 2019-10-02 NOTE — PROGRESS NOTE ADULT - PROVIDER SPECIALTY LIST ADULT
Internal Medicine
Palliative Care

## 2019-10-02 NOTE — PROGRESS NOTE ADULT - PROBLEM SELECTOR PROBLEM 1
Debility
Debility
Shortness of breath
Debility
Shortness of breath

## 2020-01-01 NOTE — PROGRESS NOTE ADULT - NEGATIVE SKIN SYMPTOMS
no itching/no rash/no dryness
no dryness/no itching/no rash
no itching/no dryness/no rash
no itching/no rash/no dryness
no rash/no itching/no dryness
Offered, feeding was successful

## 2020-10-08 NOTE — PROGRESS NOTE ADULT - NEGATIVE CARDIOVASCULAR SYMPTOMS
Name band;
no palpitations/no chest pain
no palpitations
no palpitations
no chest pain/no palpitations
no palpitations/no chest pain

## 2021-12-16 NOTE — PROGRESS NOTE ADULT - PROBLEM SELECTOR PLAN 2
obstruction uropathy s/p FC removal today  improving renal function POD1 wound debridement   - Patient is s/p left peroneal tendon repair with surgical site infection in August 21  - Patient presented from wound care center with Hx of surgical wound infection in left ankle for Abx treatment and wound debridement.  - MRI Left Ankle shows: Soft tissue ulcer overlying the lateral aspect of the ankle with   subjacent small abscess/phlegmon. Osseous edema with minimal T1 marrow signal abnormality within the lateral malleolus, which may represent osteitis or early osteomyelitis.  - S/P Vanc and Zosyn x 1 each in the ED, c/w zosyn at this time  - BCx- NGTD  - leukocytosis today, likely reactive. will continue to follow  - ESR and CRP wnl  - ID Dr. Cueto consulted, will appreciate recs  - Podiatry following

## 2022-05-24 NOTE — PROVIDER CONTACT NOTE (OTHER) - ACTION/TREATMENT ORDERED:
If that was 2 weeks ago you should call [place] and follow up or start the appeal PA notified and aware. will f/u with family regarding medication

## 2022-07-22 NOTE — ED ADULT NURSE NOTE - GENITOURINARY WDL
Detail Level: Detailed Depth Of Biopsy: dermis Was A Bandage Applied: Yes Size Of Lesion In Cm: 0 Biopsy Type: H and E Biopsy Method: Personna blade Anesthesia Type: 1% lidocaine with epinephrine Anesthesia Volume In Cc (Will Not Render If 0): 0.4 Hemostasis: Drysol and Monsel's Wound Care: Petrolatum Dressing: no dressing applied Destruction After The Procedure: No Type Of Destruction Used: Cryotherapy Bladder non-tender and non-distended. Urine clear yellow. Curettage Text: The wound bed was treated with curettage after the biopsy was performed. Cryotherapy Text: The wound bed was treated with cryotherapy after the biopsy was performed. Electrodesiccation Text: The wound bed was treated with electrodesiccation after the biopsy was performed. Electrodesiccation And Curettage Text: The wound bed was treated with electrodesiccation and curettage after the biopsy was performed. Silver Nitrate Text: The wound bed was treated with silver nitrate after the biopsy was performed. Lab: 6 Lab Facility: 3 Consent: Written consent was obtained and risks were reviewed including but not limited to scarring, infection, bleeding, scabbing, incomplete removal, nerve damage and allergy to anesthesia. Post-Care Instructions: We reviewed in detail and to patient's understanding post-care instructions. Patient is to genty wash the biopsy site with water and gentle soap (i.e. Dove, Cerave), dry and thencovered with topical vaseline or OTC antibiotic ointment (i.e. polysporin) at least once daily Notification Instructions: Patient will be notified of biopsy results. However, patient instructed to call the office if not contacted within 2 weeks. Billing Type: Third-Party Bill Information: Selecting Yes will display possible errors in your note based on the variables you have selected. This validation is only offered as a suggestion for you. PLEASE NOTE THAT THE VALIDATION TEXT WILL BE REMOVED WHEN YOU FINALIZE YOUR NOTE. IF YOU WANT TO FAX A PRELIMINARY NOTE YOU WILL NEED TO TOGGLE THIS TO 'NO' IF YOU DO NOT WANT IT IN YOUR FAXED NOTE.

## 2023-03-23 NOTE — ED PROVIDER NOTE - ATTENDING CONTRIBUTION TO CARE
Patient presenting complaining of cough from home hospice.  Began yesterday and progressively worsened.  Had outpatient CXR today with PNA, sent by PMD for likely admission for treatment.  On hospice for stomach cancer, however family wants treatment for other conditions.  No recent inpatient admission, no known sick contacts, no fevers appreciated at home.    Exam:  General: Patient chronically ill appearing, cachectic, tachycardic tachypneic  HEENT: airway patent with dry mucous membranes  Cardiac: irregularly irregular tachycardia  Respiratory: tachypneic, dry cough appreciated in exam room, rhonchi noted on R side  GI: abdomen soft, non tender  Skin: intact, warm    Patient on home hospice sent by PMD for likely PNA on outpatient CXR, will repeat labs, CXR in Emergency Department to confirm diagnosis, start ABx for CAP, discuss with PMD, likely admission. Melolabial Interpolation Flap Text: A decision was made to reconstruct the defect utilizing an interpolation axial flap and a staged reconstruction.  A telfa template was made of the defect.  This telfa template was then used to outline the melolabial interpolation flap.  The donor area for the pedicle flap was then injected with anesthesia.  The flap was excised through the skin and subcutaneous tissue down to the layer of the underlying musculature.  The pedicle flap was carefully excised within this deep plane to maintain its blood supply.  The edges of the donor site were undermined.   The donor site was closed in a primary fashion.  The pedicle was then rotated into position and sutured.  Once the tube was sutured into place, adequate blood supply was confirmed with blanching and refill.  The pedicle was then wrapped with xeroform gauze and dressed appropriately with a telfa and gauze bandage to ensure continued blood supply and protect the attached pedicle.

## 2023-06-25 NOTE — PROGRESS NOTE ADULT - PROBLEM/PLAN-1
DISPLAY PLAN FREE TEXT
complains of pain/discomfort

## 2023-12-11 NOTE — PHYSICAL THERAPY INITIAL EVALUATION ADULT - PERTINENT HX OF CURRENT PROBLEM, REHAB EVAL
Let's get her in to see a therapist to discuss her anxiety symptoms.     Start Fiber gummies every day.    Pt is a 85 y/o male admitted to [facility] on 9/24/19 bilingual Swedish/english speaking, c hx advanced gastric cancer never on chemo, on home hospice (h[facility]), ?CKD, gastric ulcer, pericardial effusion s/p tap (Dec '18), obstructive uropathy s/p chronic ayala (Dec '18), dysphagia on pureed with thin liquids, hypothyroidism, anemia receiving IV iron and IV albumin infusion by heme, presents from home with poss PNA on home CXR, coughing for 2 days, confusion for 1 day.

## 2024-09-03 NOTE — INPATIENT CERTIFICATION FOR MEDICARE PATIENTS - PHYSICIAN CONCUR
I concur with the Admission Order and I certify that services are provided in accordance with Section 42 CFR § 412.3 bilateral  lower extremity Active ROM was WFL (within functional limits)

## 2025-03-16 NOTE — DIETITIAN INITIAL EVALUATION ADULT. - PROBLEM/PLAN-3
03/16/25 0810   Events/Summary/Plan   Events/Summary/Plan pt not in room check bipap and refilled water   Non-Invasive Ventilation [facility]   Nocturnal CPAP or BIPAP BIPAP - Home Unit  (wore for 5.8 hours)   Cleanliness and Damage Inspection Performed Yes        DISPLAY PLAN FREE TEXT